# Patient Record
Sex: MALE | Race: WHITE | NOT HISPANIC OR LATINO | Employment: OTHER | URBAN - METROPOLITAN AREA
[De-identification: names, ages, dates, MRNs, and addresses within clinical notes are randomized per-mention and may not be internally consistent; named-entity substitution may affect disease eponyms.]

---

## 2017-07-27 DIAGNOSIS — M54.2 CERVICAL PAIN (NECK): Primary | ICD-10-CM

## 2017-08-14 ENCOUNTER — OFFICE VISIT (OUTPATIENT)
Dept: ORTHOPEDICS | Facility: CLINIC | Age: 67
End: 2017-08-14
Payer: OTHER MISCELLANEOUS

## 2017-08-14 ENCOUNTER — HOSPITAL ENCOUNTER (OUTPATIENT)
Dept: RADIOLOGY | Facility: HOSPITAL | Age: 67
Discharge: HOME | End: 2017-08-14
Attending: ORTHOPAEDIC SURGERY
Payer: OTHER MISCELLANEOUS

## 2017-08-14 VITALS — WEIGHT: 173 LBS | HEIGHT: 70 IN | BODY MASS INDEX: 24.77 KG/M2

## 2017-08-14 DIAGNOSIS — M54.2 CERVICAL PAIN (NECK): Primary | ICD-10-CM

## 2017-08-14 DIAGNOSIS — M54.2 CERVICAL PAIN (NECK): ICD-10-CM

## 2017-08-14 DIAGNOSIS — M43.02 CERVICAL SPONDYLOLYSIS: ICD-10-CM

## 2017-08-14 PROCEDURE — 3008F BODY MASS INDEX DOCD: CPT | Mod: S$GLB,,, | Performed by: ORTHOPAEDIC SURGERY

## 2017-08-14 PROCEDURE — 99999 PR PBB SHADOW E&M-EST. PATIENT-LVL III: CPT | Mod: PBBFAC,,, | Performed by: ORTHOPAEDIC SURGERY

## 2017-08-14 PROCEDURE — 1159F MED LIST DOCD IN RCRD: CPT | Mod: S$GLB,,, | Performed by: ORTHOPAEDIC SURGERY

## 2017-08-14 PROCEDURE — 72040 X-RAY EXAM NECK SPINE 2-3 VW: CPT | Mod: 26,,, | Performed by: RADIOLOGY

## 2017-08-14 PROCEDURE — 1126F AMNT PAIN NOTED NONE PRSNT: CPT | Mod: S$GLB,,, | Performed by: ORTHOPAEDIC SURGERY

## 2017-08-14 PROCEDURE — 99213 OFFICE O/P EST LOW 20 MIN: CPT | Mod: S$GLB,,, | Performed by: ORTHOPAEDIC SURGERY

## 2017-08-14 RX ORDER — DOXYCYCLINE 100 MG/1
100 CAPSULE ORAL EVERY 12 HOURS
Status: ON HOLD | COMMUNITY
End: 2020-08-20

## 2017-08-14 RX ORDER — GABAPENTIN 300 MG/1
300 CAPSULE ORAL 3 TIMES DAILY
COMMUNITY

## 2017-08-14 NOTE — PROGRESS NOTES
Subjective:      Patient ID: Marcos Barfield is a 67 y.o. male.    Chief Complaint: Pain of the Right Shoulder    HPI  Marcos Barfield has right trapezius region pain pain.  The pain is located in the trapezius region.. There is not radiation. He denies any progression of weaknes. There is not instabilty. The pain is described as achy. The pain is aggravated by laying on it and increaesed activity, such as heavy lifting. He has not been shrimping as much as he has in the past. It is alleviated by rest. There is not associated back pain. His history, medications and problem list were reviewed.  Past Medical History:   Diagnosis Date    Anemia     kid      No past surgical history on file.  No family history on file.  Social History     Social History    Marital status:      Spouse name: N/A    Number of children: N/A    Years of education: N/A     Occupational History    Not on file.     Social History Main Topics    Smoking status: Never Smoker    Smokeless tobacco: Never Used    Alcohol use No    Drug use: No    Sexual activity: Not on file     Other Topics Concern    Not on file     Social History Narrative    No narrative on file     Current Outpatient Prescriptions on File Prior to Visit   Medication Sig Dispense Refill    aspirin (ECOTRIN) 81 MG EC tablet Take 81 mg by mouth once daily.        fluticasone (FLONASE) 50 mcg/actuation nasal spray   0    insulin glargine (LANTUS) 100 unit/mL injection Inject 32 Units into the skin every evening.        lisinopril (PRINIVIL,ZESTRIL) 20 MG tablet Take 20 mg by mouth once daily.      metformin (GLUCOPHAGE) 1000 MG tablet Take 1,000 mg by mouth 2 (two) times daily with meals.        meclizine (ANTIVERT) 25 mg tablet   3    simvastatin (ZOCOR) 80 MG tablet Take 80 mg by mouth every evening.         No current facility-administered medications on file prior to visit.      Review of patient's allergies indicates:   Allergen Reactions    Meloxicam  "Other (See Comments)     Drove blood pressure james high per pt       Review of Systems   Constitution: Negative for chills, fever and night sweats.   HENT: Negative for headaches and hearing loss.    Eyes: Negative for blurred vision and double vision.   Cardiovascular: Negative for chest pain, claudication and leg swelling.   Respiratory: Negative for shortness of breath.    Endocrine: Negative for polydipsia, polyphagia and polyuria.   Hematologic/Lymphatic: Negative for adenopathy and bleeding problem. Does not bruise/bleed easily.   Skin: Negative for poor wound healing.   Musculoskeletal: Positive for joint pain.   Gastrointestinal: Negative for diarrhea and heartburn.   Genitourinary: Negative for bladder incontinence.   Neurological: Negative for focal weakness, numbness, paresthesias and sensory change.   Psychiatric/Behavioral: The patient is not nervous/anxious.    Allergic/Immunologic: Negative for persistent infections.         Objective:      Body mass index is 24.82 kg/m².  Vitals:    08/14/17 0940   Weight: 78.5 kg (173 lb)   Height: 5' 10" (1.778 m)         General    Constitutional: He is oriented to person, place, and time. He appears well-developed and well-nourished.   HENT:   Head: Normocephalic and atraumatic.   Eyes: EOM are normal.   Cardiovascular: Normal rate.    Pulmonary/Chest: Effort normal.   Neurological: He is alert and oriented to person, place, and time.   Psychiatric: He has a normal mood and affect. His behavior is normal.             Back (L-Spine & T-Spine) / Neck (C-Spine) Exam      Tenderness   The patient is tender to palpation of the right trapezial (spasm).   The patient is experiencing no tenderness in the right left trapezial.      Neck (C-Spine) Range of Motion   Flexion: Mild and limited  Extension: Mild and limited  Right Lateral Bend: abnormal  Left Lateral Bend: normal  Right Rotation: abnormal  Left Rotation: normal     Other He has no scoliosis .  Spinal Kyphosis: " Absent  Spinal Lordosis: Absent  Head Tilt: Negative     Muscle Strength   Right Upper Extremity    Biceps: 5/5/5    Deltoid: 5/5  Triceps: 5/5  Wrist Extension: 5/5/5    Wrist Flexion: 5/5/5    Finger Flexors: 5/5  Finger Extensors: 5/5  Left Upper Extremity  Biceps: 5/5/5    Deltoid: 5/5  Triceps: 5/5  Wrist Extension: 5/5/5    Wrist Flexion: 5/5/5    Finger Flexors: 5/5  Finger Extensors: 5/5     Reflexes      Left Side  Biceps: 2+  Triceps: 2+  Brachioradialis: 2+  Left Woodard's Sign: Absent     Right Side    Biceps: 2+  Triceps: 2+  Brachioradialis: 2+  Right Woodard's Sign: absent     C-Spine xrays were reviewed. Chronic degenerative changes C5-6, C 6-7.  No significant change               Assessment:       Encounter Diagnoses   Name Primary?    Cervical pain (neck) Yes    Cervical spondylolysis           Plan:       Marcos was seen today for pain.    Diagnoses and all orders for this visit:    Cervical pain (neck)    Cervical spondylolysis      He will continue to take OTC NSAIDS.  He does not want any Meloxicam or muscle relaxer.  F/U in one year with C-Spine Xrays.

## 2017-09-02 ENCOUNTER — OFFICE VISIT (OUTPATIENT)
Dept: URGENT CARE | Facility: CLINIC | Age: 67
End: 2017-09-02
Payer: COMMERCIAL

## 2017-09-02 VITALS
OXYGEN SATURATION: 99 % | HEIGHT: 70 IN | TEMPERATURE: 99 F | HEART RATE: 73 BPM | DIASTOLIC BLOOD PRESSURE: 91 MMHG | BODY MASS INDEX: 24.34 KG/M2 | WEIGHT: 170 LBS | SYSTOLIC BLOOD PRESSURE: 154 MMHG

## 2017-09-02 DIAGNOSIS — M46.1 SACROILIITIS: Primary | ICD-10-CM

## 2017-09-02 PROCEDURE — 1159F MED LIST DOCD IN RCRD: CPT | Mod: S$GLB,,, | Performed by: EMERGENCY MEDICINE

## 2017-09-02 PROCEDURE — 3008F BODY MASS INDEX DOCD: CPT | Mod: S$GLB,,, | Performed by: EMERGENCY MEDICINE

## 2017-09-02 PROCEDURE — 96372 THER/PROPH/DIAG INJ SC/IM: CPT | Mod: S$GLB,,, | Performed by: EMERGENCY MEDICINE

## 2017-09-02 PROCEDURE — 99203 OFFICE O/P NEW LOW 30 MIN: CPT | Mod: 25,S$GLB,, | Performed by: EMERGENCY MEDICINE

## 2017-09-02 RX ORDER — BETAMETHASONE SODIUM PHOSPHATE AND BETAMETHASONE ACETATE 3; 3 MG/ML; MG/ML
9 INJECTION, SUSPENSION INTRA-ARTICULAR; INTRALESIONAL; INTRAMUSCULAR; SOFT TISSUE
Status: COMPLETED | OUTPATIENT
Start: 2017-09-02 | End: 2017-09-02

## 2017-09-02 RX ORDER — KETOROLAC TROMETHAMINE 30 MG/ML
30 INJECTION, SOLUTION INTRAMUSCULAR; INTRAVENOUS
Status: COMPLETED | OUTPATIENT
Start: 2017-09-02 | End: 2017-09-02

## 2017-09-02 RX ADMIN — KETOROLAC TROMETHAMINE 30 MG: 30 INJECTION, SOLUTION INTRAMUSCULAR; INTRAVENOUS at 12:09

## 2017-09-02 RX ADMIN — BETAMETHASONE SODIUM PHOSPHATE AND BETAMETHASONE ACETATE 9 MG: 3; 3 INJECTION, SUSPENSION INTRA-ARTICULAR; INTRALESIONAL; INTRAMUSCULAR; SOFT TISSUE at 12:09
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
## 2018-08-02 DIAGNOSIS — M54.2 NECK PAIN: Primary | ICD-10-CM

## 2018-08-15 ENCOUNTER — HOSPITAL ENCOUNTER (OUTPATIENT)
Dept: RADIOLOGY | Facility: HOSPITAL | Age: 68
Discharge: HOME | End: 2018-08-15
Attending: ORTHOPAEDIC SURGERY
Payer: OTHER MISCELLANEOUS

## 2018-08-15 ENCOUNTER — OFFICE VISIT (OUTPATIENT)
Dept: ORTHOPEDICS | Facility: CLINIC | Age: 68
End: 2018-08-15
Payer: OTHER MISCELLANEOUS

## 2018-08-15 VITALS — HEIGHT: 70 IN | WEIGHT: 170 LBS | BODY MASS INDEX: 24.34 KG/M2

## 2018-08-15 DIAGNOSIS — M54.2 NECK PAIN: ICD-10-CM

## 2018-08-15 DIAGNOSIS — M54.2 CERVICAL PAIN (NECK): Primary | ICD-10-CM

## 2018-08-15 DIAGNOSIS — M43.02 CERVICAL SPONDYLOLYSIS: ICD-10-CM

## 2018-08-15 PROCEDURE — 72050 X-RAY EXAM NECK SPINE 4/5VWS: CPT | Mod: 26,,, | Performed by: RADIOLOGY

## 2018-08-15 PROCEDURE — 72050 X-RAY EXAM NECK SPINE 4/5VWS: CPT | Mod: TC

## 2018-08-15 PROCEDURE — 99213 OFFICE O/P EST LOW 20 MIN: CPT | Mod: S$GLB,,, | Performed by: ORTHOPAEDIC SURGERY

## 2018-08-15 PROCEDURE — 99999 PR PBB SHADOW E&M-EST. PATIENT-LVL III: CPT | Mod: PBBFAC,,, | Performed by: ORTHOPAEDIC SURGERY

## 2018-08-15 RX ORDER — PRAVASTATIN SODIUM 80 MG/1
80 TABLET ORAL NIGHTLY
COMMUNITY

## 2018-08-15 NOTE — PROGRESS NOTES
Patient ID: Marcos Barfield is a 68 y.o. male.     Chief Complaint: Pain of the Right Shoulder     HPI  Marcos Barfield has right trapezius region pain pain.  The pain is located in the trapezius region.. There is not radiation. He denies any progression of weaknes. There is not instabilty. The pain is described as achy. The pain is aggravated by laying on it and increaesed activity, such as heavy lifting. He has not been shrimping as much as he has in the past. It is alleviated by rest. There is not associated back pain. His history, medications and problem list were reviewed.       Past Medical History:   Diagnosis Date    Anemia       kid       No past surgical history on file.  No family history on file.  Social History               Social History    Marital status:        Spouse name: N/A    Number of children: N/A    Years of education: N/A          Occupational History    Not on file.           Social History Main Topics    Smoking status: Never Smoker    Smokeless tobacco: Never Used    Alcohol use No    Drug use: No    Sexual activity: Not on file           Other Topics Concern    Not on file          Social History Narrative    No narrative on file                Current Outpatient Prescriptions on File Prior to Visit   Medication Sig Dispense Refill    aspirin (ECOTRIN) 81 MG EC tablet Take 81 mg by mouth once daily.          fluticasone (FLONASE) 50 mcg/actuation nasal spray     0    insulin glargine (LANTUS) 100 unit/mL injection Inject 32 Units into the skin every evening.          lisinopril (PRINIVIL,ZESTRIL) 20 MG tablet Take 20 mg by mouth once daily.        metformin (GLUCOPHAGE) 1000 MG tablet Take 1,000 mg by mouth 2 (two) times daily with meals.          meclizine (ANTIVERT) 25 mg tablet     3    simvastatin (ZOCOR) 80 MG tablet Take 80 mg by mouth every evening.            No current facility-administered medications on file prior to visit.             Review of patient's  allergies indicates:   Allergen Reactions    Meloxicam Other (See Comments)       Drove blood pressure james high per pt         Review of Systems   Constitution: Negative for chills, fever and night sweats.   HENT: Negative for headaches and hearing loss.    Eyes: Negative for blurred vision and double vision.   Cardiovascular: Negative for chest pain, claudication and leg swelling.   Respiratory: Negative for shortness of breath.    Endocrine: Negative for polydipsia, polyphagia and polyuria.   Hematologic/Lymphatic: Negative for adenopathy and bleeding problem. Does not bruise/bleed easily.   Skin: Negative for poor wound healing.   Musculoskeletal: Positive for joint pain.   Gastrointestinal: Negative for diarrhea and heartburn.   Genitourinary: Negative for bladder incontinence.   Neurological: Negative for focal weakness, numbness, paresthesias and sensory change.   Psychiatric/Behavioral: The patient is not nervous/anxious.    Allergic/Immunologic: Negative for persistent infections.          Objective:   Body mass index is 24.39 kg/m².          General     Constitutional: He is oriented to person, place, and time. He appears well-developed and well-nourished.   HENT:   Head: Normocephalic and atraumatic.   Eyes: EOM are normal.   Cardiovascular: Normal rate.    Pulmonary/Chest: Effort normal.   Neurological: He is alert and oriented to person, place, and time.   Psychiatric: He has a normal mood and affect. His behavior is normal.                  Back (L-Spine & T-Spine) / Neck (C-Spine) Exam      Tenderness   The patient is tender to palpation of the right trapezial (spasm).   The patient is experiencing no tenderness in the left trapezial region.      Neck (C-Spine) Range of Motion   Flexion: Mild and limited  Extension: Mild and limited  Right Lateral Bend: abnormal  Left Lateral Bend: normal  Right Rotation: abnormal  Left Rotation: normal     Other He has no scoliosis .  Spinal Kyphosis: Absent  Spinal  Lordosis: Absent  Head Tilt: Negative     Muscle Strength   Right Upper Extremity    Biceps: 5/5/5    Deltoid: 5/5  Triceps: 5/5  Wrist Extension: 5/5/5    Wrist Flexion: 5/5/5    Finger Flexors: 5/5  Finger Extensors: 5/5  Left Upper Extremity  Biceps: 5/5/5    Deltoid: 5/5  Triceps: 5/5  Wrist Extension: 5/5/5    Wrist Flexion: 5/5/5    Finger Flexors: 5/5  Finger Extensors: 5/5     Reflexes      Left Side  Biceps: 2+  Triceps: 2+  Brachioradialis: 2+  Left Woodard's Sign: Absent     Right Side    Biceps: 2+  Triceps: 2+  Brachioradialis: 2+  Right Woodard's Sign: absent     C-Spine xrays were reviewed. Chronic degenerative changes C5-6, C 6-7.  No significant change since august 2017                  Assessment:            Encounter Diagnoses   Name Primary?    Cervical pain (neck) Yes    Cervical spondylolysis            Plan:       Marcos was seen today for pain.     Diagnoses and all orders for this visit:     Cervical pain (neck)     Cervical spondylolysis        He will continue to take OTC NSAIDS.  He declined Meloxicam.  At this point he will follow up if symptoms worsen.

## 2019-08-16 ENCOUNTER — TELEPHONE (OUTPATIENT)
Dept: ORTHOPEDICS | Facility: CLINIC | Age: 69
End: 2019-08-16

## 2019-08-16 NOTE — TELEPHONE ENCOUNTER
LVM for pt to  Call office back schedudle his appt.        ----- Message from Leela Smith sent at 8/16/2019 10:56 AM CDT -----  Patient calling in requesting a call back in regards to scheduling a workmens comp appt     Please call patient at [phone]    Thank you ,     Melissa - training

## 2019-10-01 DIAGNOSIS — M54.2 NECK PAIN: Primary | ICD-10-CM

## 2019-10-02 ENCOUNTER — HOSPITAL ENCOUNTER (OUTPATIENT)
Dept: RADIOLOGY | Facility: HOSPITAL | Age: 69
Discharge: HOME | End: 2019-10-02
Attending: ORTHOPAEDIC SURGERY
Payer: OTHER MISCELLANEOUS

## 2019-10-02 ENCOUNTER — OFFICE VISIT (OUTPATIENT)
Dept: ORTHOPEDICS | Facility: CLINIC | Age: 69
End: 2019-10-02
Payer: OTHER MISCELLANEOUS

## 2019-10-02 VITALS — BODY MASS INDEX: 24.78 KG/M2 | HEIGHT: 70 IN | WEIGHT: 173.06 LBS

## 2019-10-02 DIAGNOSIS — M54.12 CERVICAL RADICULOPATHY: Primary | ICD-10-CM

## 2019-10-02 DIAGNOSIS — M54.2 NECK PAIN: ICD-10-CM

## 2019-10-02 PROCEDURE — 72050 XR CERVICAL SPINE AP LAT WITH FLEX EXTEN: ICD-10-PCS | Mod: 26,,, | Performed by: RADIOLOGY

## 2019-10-02 PROCEDURE — 99213 PR OFFICE/OUTPT VISIT, EST, LEVL III, 20-29 MIN: ICD-10-PCS | Mod: S$GLB,,, | Performed by: ORTHOPAEDIC SURGERY

## 2019-10-02 PROCEDURE — 99999 PR PBB SHADOW E&M-EST. PATIENT-LVL III: CPT | Mod: PBBFAC,,, | Performed by: ORTHOPAEDIC SURGERY

## 2019-10-02 PROCEDURE — 99213 OFFICE O/P EST LOW 20 MIN: CPT | Mod: S$GLB,,, | Performed by: ORTHOPAEDIC SURGERY

## 2019-10-02 PROCEDURE — 72050 X-RAY EXAM NECK SPINE 4/5VWS: CPT | Mod: TC

## 2019-10-02 PROCEDURE — 99999 PR PBB SHADOW E&M-EST. PATIENT-LVL III: ICD-10-PCS | Mod: PBBFAC,,, | Performed by: ORTHOPAEDIC SURGERY

## 2019-10-02 PROCEDURE — 72050 X-RAY EXAM NECK SPINE 4/5VWS: CPT | Mod: 26,,, | Performed by: RADIOLOGY

## 2019-10-08 ENCOUNTER — HOSPITAL ENCOUNTER (OUTPATIENT)
Dept: RADIOLOGY | Facility: HOSPITAL | Age: 69
Discharge: HOME | End: 2019-10-08
Attending: ORTHOPAEDIC SURGERY
Payer: OTHER MISCELLANEOUS

## 2019-10-08 DIAGNOSIS — M54.12 CERVICAL RADICULOPATHY: ICD-10-CM

## 2019-10-08 PROCEDURE — 72141 MRI NECK SPINE W/O DYE: CPT | Mod: 26,,, | Performed by: RADIOLOGY

## 2019-10-08 PROCEDURE — 72141 MRI CERVICAL SPINE WITHOUT CONTRAST: ICD-10-PCS | Mod: 26,,, | Performed by: RADIOLOGY

## 2019-10-08 PROCEDURE — 72141 MRI NECK SPINE W/O DYE: CPT | Mod: TC

## 2019-10-10 ENCOUNTER — TELEPHONE (OUTPATIENT)
Dept: ORTHOPEDICS | Facility: CLINIC | Age: 69
End: 2019-10-10

## 2019-10-10 NOTE — TELEPHONE ENCOUNTER
Lm for pt to call back regarding his MRI results.    ----- Message from Charles Zamorano MD sent at 10/8/2019 12:16 PM CDT -----  Please call pt.  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  Please schedule with Dr. Saldana.

## 2019-10-11 ENCOUNTER — TELEPHONE (OUTPATIENT)
Dept: ORTHOPEDICS | Facility: CLINIC | Age: 69
End: 2019-10-11

## 2019-10-11 NOTE — TELEPHONE ENCOUNTER
Spoke with pt, notified him of his results and scheduled an appt with back and spine per GC. Pt verbalized understanding and had no further questions.    ----- Message from Cherise Lai sent at 10/11/2019  2:00 PM CDT -----  Contact: patient  [phone]-please call above patient at number in message said,he has called a couple of times need to speak with the nurse,about test results very concerned waiting on a call back thanks.

## 2019-10-11 NOTE — TELEPHONE ENCOUNTER
Attempted to contact pt regarding his MRI results there was no answer and no vm.    ----- Message from Long Fontenot sent at 10/11/2019  9:15 AM CDT -----  Contact: patient   Please call pt at [phone]    Patient is returning staff call from yesterday regarding his MRI scan results    Thank you

## 2019-10-16 ENCOUNTER — OFFICE VISIT (OUTPATIENT)
Dept: ORTHOPEDICS | Facility: CLINIC | Age: 69
End: 2019-10-16
Payer: OTHER MISCELLANEOUS

## 2019-10-16 VITALS — HEIGHT: 70 IN | WEIGHT: 169.75 LBS | BODY MASS INDEX: 24.3 KG/M2

## 2019-10-16 DIAGNOSIS — G89.29 CHRONIC RIGHT SHOULDER PAIN: Primary | ICD-10-CM

## 2019-10-16 DIAGNOSIS — M25.511 CHRONIC RIGHT SHOULDER PAIN: Primary | ICD-10-CM

## 2019-10-16 DIAGNOSIS — M50.30 DDD (DEGENERATIVE DISC DISEASE), CERVICAL: ICD-10-CM

## 2019-10-16 PROCEDURE — 99214 PR OFFICE/OUTPT VISIT, EST, LEVL IV, 30-39 MIN: ICD-10-PCS | Mod: S$GLB,,, | Performed by: PHYSICIAN ASSISTANT

## 2019-10-16 PROCEDURE — 99999 PR PBB SHADOW E&M-EST. PATIENT-LVL III: CPT | Mod: PBBFAC,,, | Performed by: PHYSICIAN ASSISTANT

## 2019-10-16 PROCEDURE — 99999 PR PBB SHADOW E&M-EST. PATIENT-LVL III: ICD-10-PCS | Mod: PBBFAC,,, | Performed by: PHYSICIAN ASSISTANT

## 2019-10-16 PROCEDURE — 99214 OFFICE O/P EST MOD 30 MIN: CPT | Mod: S$GLB,,, | Performed by: PHYSICIAN ASSISTANT

## 2019-10-16 NOTE — LETTER
October 16, 2019      Charles Zamorano MD  [address]  [place] [address]           [facility]  [address]  [place] [address]  Phone: [phone]          Patient: Marcos Barfield   MR Number: [id number]   YOB: 1950   Date of Visit: 10/16/2019       Dear Dr. Charles Zamorano:    Thank you for referring Marcos Barfield to me for evaluation. Attached you will find relevant portions of my assessment and plan of care.    If you have questions, please do not hesitate to call me. I look forward to following Marcos Barfield along with you.    Sincerely,    Ade Berumen PA-C    Enclosure  CC:  No Recipients    If you would like to receive this communication electronically, please contact [email or web address] or [phone] to request more information on [employer] Link access.    For providers and/or their staff who would like to refer a patient to [facility], please contact us through our one-stop-shop provider referral line, [facility], at [phone].    If you feel you have received this communication in error or would no longer like to receive these types of communications, please e-mail [email or web address]

## 2019-10-16 NOTE — PROGRESS NOTES
DATE: 10/16/2019  PATIENT: Marcos Barfield    Supervising Physician: Brandan Saldana M.D.    CHIEF COMPLAINT: right shoulder pain    HISTORY:  Marcos Barfield is a 69 y.o. male previously seen by Dr. Zamorano here for initial evaluation of right shoulder pain (Neck - 4, Shoulder - 4). The pain has been present for many years. The patient describes the pain as aching. The pain is worse with reaching behind and improved by rest. There is associated numbness and tingling related to neuropathy. There is no subjective weakness. Prior treatments have included gabapentin and ESIs in 2006 or 2007, but no surgery.     The patient denies myelopathic symptoms such as handwriting changes or difficulty with buttons/coins/keys. Denies perineal paresthesias, bowel/bladder dysfunction.    PAST MEDICAL/SURGICAL HISTORY:  Past Medical History:   Diagnosis Date    Anemia     kid      History reviewed. No pertinent surgical history.    Medications:  Current Outpatient Medications on File Prior to Visit   Medication Sig Dispense Refill    aspirin (ECOTRIN) 81 MG EC tablet Take 81 mg by mouth once daily.        doxycycline (VIBRAMYCIN) 100 MG Cap Take 100 mg by mouth every 12 (twelve) hours.      fluticasone (FLONASE) 50 mcg/actuation nasal spray   0    gabapentin (NEURONTIN) 300 MG capsule Take 300 mg by mouth 3 (three) times daily.      insulin glargine (LANTUS) 100 unit/mL injection Inject 32 Units into the skin every evening.        lisinopril (PRINIVIL,ZESTRIL) 20 MG tablet Take 20 mg by mouth once daily.      meclizine (ANTIVERT) 25 mg tablet   3    metformin (GLUCOPHAGE) 1000 MG tablet Take 1,000 mg by mouth 2 (two) times daily with meals.        pravastatin (PRAVACHOL) 80 MG tablet Take 80 mg by mouth once daily.      simvastatin (ZOCOR) 80 MG tablet Take 80 mg by mouth every evening.         No current facility-administered medications on file prior to visit.        Social History:   Social History     Socioeconomic  "History    Marital status:      Spouse name: Not on file    Number of children: Not on file    Years of education: Not on file    Highest education level: Not on file   Occupational History    Not on file   Social Needs    Financial resource strain: Not on file    Food insecurity:     Worry: Not on file     Inability: Not on file    Transportation needs:     Medical: Not on file     Non-medical: Not on file   Tobacco Use    Smoking status: Never Smoker    Smokeless tobacco: Never Used   Substance and Sexual Activity    Alcohol use: No    Drug use: No    Sexual activity: Not on file   Lifestyle    Physical activity:     Days per week: Not on file     Minutes per session: Not on file    Stress: Not on file   Relationships    Social connections:     Talks on phone: Not on file     Gets together: Not on file     Attends [employer] service: Not on file     Active member of club or organization: Not on file     Attends meetings of clubs or organizations: Not on file     Relationship status: Not on file   Other Topics Concern    Not on file   Social History Narrative    Not on file       REVIEW OF SYSTEMS:  Constitution: Negative. Negative for chills, fever and night sweats.   Cardiovascular: Negative for chest pain and syncope.   Respiratory: Negative for cough and shortness of breath.   Gastrointestinal: See HPI. Negative for nausea/vomiting. Negative for abdominal pain.  Genitourinary: See HPI. Negative for discoloration or dysuria.  Skin: Negative for dry skin, itching and rash.   Hematologic/Lymphatic: Negative for bleeding problem. Does not bruise/bleed easily.   Musculoskeletal: Negative for falls and muscle weakness.   Neurological: See HPI. No seizures.   Endocrine: Negative for polydipsia, polyphagia and polyuria.   Allergic/Immunologic: Negative for hives and persistent infections.  Psychiatric/Behavioral: Negative for depression and insomnia.         EXAM:  Ht 5' 10" (1.778 m)   Wt 77 " kg (169 lb 12.1 oz)   BMI 24.36 kg/m²     General: The patient is a very pleasant 69 y.o. male in no apparent distress, the patient is oriented to person, place and time.  Psych: Normal mood and affect  HEENT: Vision grossly intact, hearing intact to the spoken word.  Lungs: Respirations unlabored.  Gait: Normal station and gait, no difficulty with toe or heel walk.   Skin: Cervical skin negative for rashes, lesions, hairy patches and surgical scars.  Range of motion: Cervical range of motion is acceptable. There is minimal cervical tenderness to palpation.  There is tenderness to palpation of the right shoulder.  Spinal Balance: Global saggital and coronal spinal balance acceptable, no significant for scoliosis and kyphosis.  Musculoskeletal: there is pain with the range of motion of the right shoulder. Normal bulk and contour of the bilateral hands.  Vascular: Bilateral hands warm and well perfused, radial pulses 2+ bilaterally.  Neurological: Normal strength and tone in all major motor groups in the bilateral upper and lower extremities. Normal sensation to light touch in the C5-T1 and L2-S1 dermatomes bilaterally.  Deep tendon reflexes symmetric 2+ in the bilateral upper and lower extremities.  Negative Inverted Radial Reflex and Woodard's bilaterally. Negative Babinski bilaterally.     IMAGING:   Today I personally reviewed AP, Lat and Flex/Ex  upright C-spine films that demonstrate mild degenerative changes throughout the cervical spine.    MRI cervical spine demonstrates moderate right neural foraminal narrowing at C3/4 and C6/7.      Body mass index is 24.36 kg/m².    No results found for: HGBA1C        ASSESSMENT/PLAN:    Marcos was seen today for neck pain and shoulder pain.    Diagnoses and all orders for this visit:    Chronic right shoulder pain    DDD (degenerative disc disease), cervical        The patient does have spondylosis in the cervical spine, however his symptoms seem to be more related to his  shoulder.  I will refer him to sports to evaluate his shoulder.  We can consider a cervical NICOLLE in the future if they do not think this is related to his shoulder.        Follow up if symptoms worsen or fail to improve.

## 2019-10-17 ENCOUNTER — HOSPITAL ENCOUNTER (OUTPATIENT)
Dept: RADIOLOGY | Facility: HOSPITAL | Age: 69
Discharge: HOME | End: 2019-10-17
Attending: PHYSICIAN ASSISTANT
Payer: OTHER MISCELLANEOUS

## 2019-10-17 ENCOUNTER — OFFICE VISIT (OUTPATIENT)
Dept: SPORTS MEDICINE | Facility: CLINIC | Age: 69
End: 2019-10-17
Payer: OTHER MISCELLANEOUS

## 2019-10-17 VITALS
WEIGHT: 169 LBS | BODY MASS INDEX: 24.2 KG/M2 | SYSTOLIC BLOOD PRESSURE: 144 MMHG | HEIGHT: 70 IN | HEART RATE: 67 BPM | DIASTOLIC BLOOD PRESSURE: 80 MMHG

## 2019-10-17 DIAGNOSIS — M75.101 ROTATOR CUFF SYNDROME, RIGHT: ICD-10-CM

## 2019-10-17 DIAGNOSIS — M25.511 RIGHT SHOULDER PAIN, UNSPECIFIED CHRONICITY: ICD-10-CM

## 2019-10-17 DIAGNOSIS — M25.511 RIGHT SHOULDER PAIN, UNSPECIFIED CHRONICITY: Primary | ICD-10-CM

## 2019-10-17 PROCEDURE — 99999 PR PBB SHADOW E&M-EST. PATIENT-LVL III: ICD-10-PCS | Mod: PBBFAC,,, | Performed by: PHYSICIAN ASSISTANT

## 2019-10-17 PROCEDURE — 73030 X-RAY EXAM OF SHOULDER: CPT | Mod: TC,RT

## 2019-10-17 PROCEDURE — 73030 X-RAY EXAM OF SHOULDER: CPT | Mod: 26,RT,, | Performed by: RADIOLOGY

## 2019-10-17 PROCEDURE — 73030 XR SHOULDER COMPLETE 2 OR MORE VIEWS RIGHT: ICD-10-PCS | Mod: 26,RT,, | Performed by: RADIOLOGY

## 2019-10-17 PROCEDURE — 99203 PR OFFICE/OUTPT VISIT, NEW, LEVL III, 30-44 MIN: ICD-10-PCS | Mod: S$GLB,,, | Performed by: PHYSICIAN ASSISTANT

## 2019-10-17 PROCEDURE — 99999 PR PBB SHADOW E&M-EST. PATIENT-LVL III: CPT | Mod: PBBFAC,,, | Performed by: PHYSICIAN ASSISTANT

## 2019-10-17 PROCEDURE — 99203 OFFICE O/P NEW LOW 30 MIN: CPT | Mod: S$GLB,,, | Performed by: PHYSICIAN ASSISTANT

## 2019-10-17 NOTE — LETTER
October 17, 2019      Ade Berumen PA-C  [address]  [facility] [address]           [facility]  [address]  [facility] [address]  Phone: [phone]          Patient: Marcos Barfield   MR Number: [id number]   YOB: 1950   Date of Visit: 10/17/2019       Dear Ade Berumen:    Thank you for referring Marcos Barfield to me for evaluation. Attached you will find relevant portions of my assessment and plan of care.    If you have questions, please do not hesitate to call me. I look forward to following Marcos Barfield along with you.    Sincerely,    Florina Ocampo PA-C    Enclosure  CC:  No Recipients    If you would like to receive this communication electronically, please contact [email or web address] or [phone] to request more information on [employer] access.    For providers and/or their staff who would like to refer a patient to [facility], please contact us through our one-stop-shop provider referral line, [facility] , at [phone].    If you feel you have received this communication in error or would no longer like to receive these types of communications, please e-mail [email or web address]

## 2019-10-17 NOTE — PROGRESS NOTES
Subjective:          Chief Complaint: Marcos Barfield is a 69 y.o. male who had concerns including Pain of the Right Shoulder.    68 yo M presents to clinic with c/o right shoulder pain since 2005. 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 The saw was stuck and he pulled on it then started to experience right shoulder pain. He was seen by ortho after injury and had MRI of shoulder scheduled. He did not complete MRI due to evacuating for [place]. While staying in [place] after [place], he was seen by a different orthopedic physician who felt that pain was coming from neck and had MRI of neck. He states that he was told that he needed neck surgery. 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 He then followed up with ortho in [place] upon his return and received epidural injections twice with some improvement of sx but still had intermittent shoulder pain throughout.    Shoulder pain worse and more constant over past 2 weeks. Sharp pain starts in shoulder and radiates down arm. Pain is worse with overhead activities and reaching behind his back. Also c/o numbness/tingling down bilateral arms into fingertips. No new injury/trauma.    He saw Dr. Zamorano on 10/2/19 and was diagnosed with cervial pain and underwent MRI which showed spondylosis.  He then saw Ade Berumen PA-C who suggested that pain is coming from shoulder and to return back to see her for possible NICOLLE in future if continued radicular symptoms.     He has not completed any formal PT. He states that he does not take antiinflammatories because meloxicam has caused him to have elevated BP in the past.          Review of Systems   Constitution: Negative for chills, decreased appetite, diaphoresis, fever, malaise/fatigue, night sweats, weight gain and weight loss.   HENT: Negative for congestion, ear  pain and sore throat.    Eyes: Negative for blurred vision, discharge, double vision, pain, photophobia and redness.   Cardiovascular: Negative for chest pain, claudication, cyanosis, dyspnea on exertion, irregular heartbeat and leg swelling.   Respiratory: Negative for cough, hemoptysis and shortness of breath.    Endocrine: Negative for cold intolerance and heat intolerance.   Skin: Negative for color change, dry skin, flushing, itching, nail changes and rash.   Musculoskeletal: Positive for joint pain and neck pain. Negative for arthritis, back pain, falls, gout and joint swelling.   Gastrointestinal: Negative for abdominal pain, anorexia and diarrhea.       Pain Related Questions  Over the past 3 days, what was your average pain during activity? (I.e. running, jogging, walking, climbing stairs, getting dressed, ect.): 8  Over the past 3 days, what was your highest pain level?: 8  Over the past 3 days, what was your lowest pain level? : 4    Other  How many nights a week are you awakened by your affected body part?: 7  Was the patient's HEIGHT measured or patient reported?: Patient Reported  Was the patient's WEIGHT measured or patient reported?: Measured      Objective:        General: Marcos is well-developed, well-nourished, appears stated age, in no acute distress, alert and oriented to time, place and person.     General    Vitals reviewed.  Constitutional: He is oriented to person, place, and time. He appears well-developed and well-nourished. No distress.   Neck: Normal range of motion.   Cardiovascular: Normal rate and regular rhythm.    Pulmonary/Chest: Effort normal. No respiratory distress.   Neurological: He is alert and oriented to person, place, and time.   Psychiatric: He has a normal mood and affect. His behavior is normal. Thought content normal.         Back (L-Spine & T-Spine) / Neck (C-Spine) Exam     Tenderness   The patient is tender to palpation of the right trapezial.   Right Shoulder Exam      Inspection/Observation   Swelling: absent  Bruising: absent  Deformity: absent    Tenderness   The patient is tender to palpation of the biceps tendon, acromioclavicular joint and supraspinatus (subscapularis).    Range of Motion   Active abduction:  120 abnormal   Passive abduction:  120 abnormal   Extension: 30   Forward Flexion:  120 (+ pain) abnormal Adduction: 40   External Rotation 0 degrees: 40   Internal rotation 0 degrees:  Sacrum abnormal     Tests & Signs   Apprehension: negative  Cross arm: positive  Drop arm: negative  Painter test: positive  Impingement: positive  Anterosuperior Escape: negative  Lift Off Sign: positive  Belly Press: positive  Active Compression Test ([place]'s Sign): negative  Yergason's Test: negative  Speed's Test: positive    Other   Sensation: normal    Left Shoulder Exam     Inspection/Observation   Swelling: absent  Bruising: absent  Scars: absent  Deformity: absent  Scapular Winging: absent  Scapular Dyskinesia: negative  Atrophy: absent    Tenderness   The patient is experiencing no tenderness.     Range of Motion   Active abduction: 140   Passive abduction: 140   Extension: 30   Forward Flexion: 140 Adduction: 40   External Rotation 0 degrees: 40   Internal rotation 0 degrees: T6     Tests & Signs   Apprehension: negative  Cross arm: negative  Drop arm: negative  Painter test: negative  Impingement: negative  Anterosuperior Escape: negative  Lift Off Sign: negative  Belly Press: negative  Active Compression test ([place]'s Sign): negative  Yergasons's Test: negative  Speed's Test: negative    Other   Sensation: normal       Muscle Strength   Right Upper Extremity   Shoulder Abduction: 4/5   Shoulder Internal Rotation: 4/5   Shoulder External Rotation: 4/5   Supraspinatus: 4/5 (+ pain)/5   Subscapularis: 4/5/5   Biceps: 4/5/5   Left Upper Extremity  Shoulder Abduction: 5/5   Shoulder Internal Rotation: 5/5   Shoulder External Rotation: 5/5   Supraspinatus: 5/5/5    Subscapularis: 5/5/5   Biceps: 4/5/5       RADIOGRAPHS:    Xray right shoulder (10/17/19):  Visualized osseous structures appear unremarkable, with no evidence of recent or healing fracture, lytic destructive process, or appreciable glenohumeral arthritic change noted.  No glenohumeral dislocation.  No abnormal soft tissue calcifications.    MRI cervical spine (10/8/19):  Cervical spondylosis, resulting in mild/ moderate neural foraminal narrowing from C2-C3 through C6-C7, as above.  No spinal canal stenosis.        Assessment:       Encounter Diagnoses   Name Primary?    Right shoulder pain, unspecified chronicity Yes    Rotator cuff syndrome, right           Plan:         1. MRI right shoulder to assess for rotator cuff pathology  2. Physical therapy referral to [facility] for RC and periscapular strengthening  3. Follow up with Florina in 1 week for MRI results  4. I made the decision to obtain old records of the patient including previous notes and imaging. New imaging was ordered today of the extremity or extremities evaluated. I independently reviewed and interpreted the radiographs today as well as prior imaging. Reviewed radiographs with patient in detail.  5. Tylenol prn pain

## 2019-10-23 ENCOUNTER — HOSPITAL ENCOUNTER (OUTPATIENT)
Dept: RADIOLOGY | Facility: HOSPITAL | Age: 69
Discharge: HOME | End: 2019-10-23
Attending: PHYSICIAN ASSISTANT
Payer: OTHER MISCELLANEOUS

## 2019-10-23 DIAGNOSIS — M75.101 ROTATOR CUFF SYNDROME, RIGHT: ICD-10-CM

## 2019-10-23 DIAGNOSIS — M25.511 RIGHT SHOULDER PAIN, UNSPECIFIED CHRONICITY: ICD-10-CM

## 2019-10-23 PROCEDURE — 73221 MRI JOINT UPR EXTREM W/O DYE: CPT | Mod: TC,RT

## 2019-10-23 PROCEDURE — 73221 MRI JOINT UPR EXTREM W/O DYE: CPT | Mod: 26,RT,, | Performed by: RADIOLOGY

## 2019-10-23 PROCEDURE — 73221 MRI SHOULDER WITHOUT CONTRAST RIGHT: ICD-10-PCS | Mod: 26,RT,, | Performed by: RADIOLOGY

## 2019-10-25 ENCOUNTER — OFFICE VISIT (OUTPATIENT)
Dept: SPORTS MEDICINE | Facility: CLINIC | Age: 69
End: 2019-10-25
Payer: OTHER MISCELLANEOUS

## 2019-10-25 VITALS
BODY MASS INDEX: 24.2 KG/M2 | WEIGHT: 169 LBS | DIASTOLIC BLOOD PRESSURE: 78 MMHG | HEART RATE: 71 BPM | HEIGHT: 70 IN | SYSTOLIC BLOOD PRESSURE: 137 MMHG

## 2019-10-25 DIAGNOSIS — M19.011 DJD OF RIGHT AC (ACROMIOCLAVICULAR) JOINT: ICD-10-CM

## 2019-10-25 DIAGNOSIS — M75.21 BICEPS TENDONITIS, RIGHT: ICD-10-CM

## 2019-10-25 DIAGNOSIS — S46.011D TRAUMATIC INCOMPLETE TEAR OF RIGHT ROTATOR CUFF, SUBSEQUENT ENCOUNTER: Primary | ICD-10-CM

## 2019-10-25 PROCEDURE — 20610 DRAIN/INJ JOINT/BURSA W/O US: CPT | Mod: RT,S$GLB,, | Performed by: PHYSICIAN ASSISTANT

## 2019-10-25 PROCEDURE — 20610 PR DRAIN/INJECT LARGE JOINT/BURSA: ICD-10-PCS | Mod: RT,S$GLB,, | Performed by: PHYSICIAN ASSISTANT

## 2019-10-25 PROCEDURE — 99999 PR PBB SHADOW E&M-EST. PATIENT-LVL III: ICD-10-PCS | Mod: PBBFAC,,, | Performed by: PHYSICIAN ASSISTANT

## 2019-10-25 PROCEDURE — 99999 PR PBB SHADOW E&M-EST. PATIENT-LVL III: CPT | Mod: PBBFAC,,, | Performed by: PHYSICIAN ASSISTANT

## 2019-10-25 PROCEDURE — 99214 PR OFFICE/OUTPT VISIT, EST, LEVL IV, 30-39 MIN: ICD-10-PCS | Mod: 25,S$GLB,, | Performed by: PHYSICIAN ASSISTANT

## 2019-10-25 PROCEDURE — 99214 OFFICE O/P EST MOD 30 MIN: CPT | Mod: 25,S$GLB,, | Performed by: PHYSICIAN ASSISTANT

## 2019-10-25 RX ORDER — TRIAMCINOLONE ACETONIDE 40 MG/ML
40 INJECTION, SUSPENSION INTRA-ARTICULAR; INTRAMUSCULAR
Status: COMPLETED | OUTPATIENT
Start: 2019-10-25 | End: 2019-10-25

## 2019-10-25 RX ORDER — BUPIVACAINE HYDROCHLORIDE 2.5 MG/ML
3 INJECTION, SOLUTION INFILTRATION; PERINEURAL
Status: COMPLETED | OUTPATIENT
Start: 2019-10-25 | End: 2019-10-25

## 2019-10-25 RX ADMIN — BUPIVACAINE HYDROCHLORIDE 7.5 MG: 2.5 INJECTION, SOLUTION INFILTRATION; PERINEURAL at 03:10

## 2019-10-25 RX ADMIN — TRIAMCINOLONE ACETONIDE 40 MG: 40 INJECTION, SUSPENSION INTRA-ARTICULAR; INTRAMUSCULAR at 03:10

## 2019-10-29 PROBLEM — M25.511 RIGHT SHOULDER PAIN: Status: ACTIVE | Noted: 2019-10-29

## 2019-10-29 PROBLEM — M75.101 ROTATOR CUFF SYNDROME OF RIGHT SHOULDER: Status: ACTIVE | Noted: 2019-10-29

## 2019-12-12 ENCOUNTER — OFFICE VISIT (OUTPATIENT)
Dept: SPORTS MEDICINE | Facility: CLINIC | Age: 69
End: 2019-12-12
Payer: OTHER MISCELLANEOUS

## 2019-12-12 VITALS
DIASTOLIC BLOOD PRESSURE: 80 MMHG | BODY MASS INDEX: 24.2 KG/M2 | HEART RATE: 64 BPM | HEIGHT: 70 IN | SYSTOLIC BLOOD PRESSURE: 153 MMHG | WEIGHT: 169 LBS

## 2019-12-12 DIAGNOSIS — M25.511 CHRONIC RIGHT SHOULDER PAIN: ICD-10-CM

## 2019-12-12 DIAGNOSIS — M75.101 ROTATOR CUFF SYNDROME, RIGHT: Primary | ICD-10-CM

## 2019-12-12 DIAGNOSIS — G89.29 CHRONIC RIGHT SHOULDER PAIN: ICD-10-CM

## 2019-12-12 PROCEDURE — 99214 OFFICE O/P EST MOD 30 MIN: CPT | Mod: S$GLB,,, | Performed by: PHYSICIAN ASSISTANT

## 2019-12-12 PROCEDURE — 99999 PR PBB SHADOW E&M-EST. PATIENT-LVL III: ICD-10-PCS | Mod: PBBFAC,,, | Performed by: PHYSICIAN ASSISTANT

## 2019-12-12 PROCEDURE — 99999 PR PBB SHADOW E&M-EST. PATIENT-LVL III: CPT | Mod: PBBFAC,,, | Performed by: PHYSICIAN ASSISTANT

## 2019-12-12 PROCEDURE — 1125F AMNT PAIN NOTED PAIN PRSNT: CPT | Mod: S$GLB,,, | Performed by: PHYSICIAN ASSISTANT

## 2019-12-12 PROCEDURE — 99214 PR OFFICE/OUTPT VISIT, EST, LEVL IV, 30-39 MIN: ICD-10-PCS | Mod: S$GLB,,, | Performed by: PHYSICIAN ASSISTANT

## 2019-12-12 PROCEDURE — 1159F MED LIST DOCD IN RCRD: CPT | Mod: S$GLB,,, | Performed by: PHYSICIAN ASSISTANT

## 2019-12-12 PROCEDURE — 1159F PR MEDICATION LIST DOCUMENTED IN MEDICAL RECORD: ICD-10-PCS | Mod: S$GLB,,, | Performed by: PHYSICIAN ASSISTANT

## 2019-12-12 PROCEDURE — 1125F PR PAIN SEVERITY QUANTIFIED, PAIN PRESENT: ICD-10-PCS | Mod: S$GLB,,, | Performed by: PHYSICIAN ASSISTANT

## 2019-12-12 NOTE — PROGRESS NOTES
Subjective:          Chief Complaint: Marcos Barfield is a 69 y.o. male who had concerns including Pain of the Right Shoulder.    68 yo male with PMHx of DM presents to clinic with c/o right shoulder pain since 2005. He is here today for follow up right shoulder. This is a worker's comp injury. 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 The saw was stuck and he pulled on it then started to experience right shoulder pain. He was seen by ortho after injury and had MRI of shoulder scheduled. He did not complete MRI due to evacuating for [place]. While staying in [place] after [place], he was seen by a different orthopedic physician who felt that pain was coming from neck and had MRI of neck. He states that he was told that he needed neck surgery. 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 He then followed up with ortho in [place] upon his return and received epidural injections twice with some improvement of sx but still had intermittent shoulder pain throughout.    He saw Dr. Zamorano on 10/2/19 and was diagnosed with cervial pain and underwent MRI which showed spondylosis.  He then saw Ade Berumen PA-C who suggested that pain is coming from shoulder and to return back to see her for possible NICOLLE in future if continued radicular symptoms.     He has completed formal PT for 6 weeks for his shoulder with an increase in his ROM and strength and a decrease in his pain. He received a steroid injection in his right shoulder on 10/25/19 with no benefits. He states that he does not take antiinflammatories because meloxicam has caused him to have elevated BP in the past. Pain is worse with lying on right shoulder and reaching behind him. He is not interested in surgery at this time and would like to continue formal PT. He reports pain not being constant anymore.  Pain at its worst is 3/10.      Pain   Associated symptoms include neck pain. Pertinent negatives include no abdominal pain, anorexia, chest pain, chills, congestion, coughing, diaphoresis, fever, joint swelling, rash or sore throat.       Review of Systems   Constitution: Negative for chills, decreased appetite, diaphoresis, fever, malaise/fatigue, night sweats, weight gain and weight loss.   HENT: Negative for congestion, ear pain and sore throat.    Eyes: Negative for blurred vision, discharge, double vision, pain, photophobia and redness.   Cardiovascular: Negative for chest pain, claudication, cyanosis, dyspnea on exertion, irregular heartbeat and leg swelling.   Respiratory: Negative for cough, hemoptysis and shortness of breath.    Endocrine: Negative for cold intolerance and heat intolerance.   Skin: Negative for color change, dry skin, flushing, itching, nail changes and rash.   Musculoskeletal: Positive for joint pain and neck pain. Negative for arthritis, back pain, falls, gout and joint swelling.   Gastrointestinal: Negative for abdominal pain, anorexia and diarrhea.       Pain Related Questions  Over the past 3 days, what was your average pain during activity? (I.e. running, jogging, walking, climbing stairs, getting dressed, ect.): 7  Over the past 3 days, what was your highest pain level?: 7  Over the past 3 days, what was your lowest pain level? : 4    Other  Was the patient's HEIGHT measured or patient reported?: Patient Reported  Was the patient's WEIGHT measured or patient reported?: Measured      Objective:        General: Marcos is well-developed, well-nourished, appears stated age, in no acute distress, alert and oriented to time, place and person.     General    Vitals reviewed.  Constitutional: He is oriented to person, place, and time. He appears well-developed and well-nourished. No distress.   Neck: Normal range of motion.   Cardiovascular: Normal rate and regular rhythm.     Pulmonary/Chest: Effort normal. No respiratory distress.   Neurological: He is alert and oriented to person, place, and time.   Psychiatric: He has a normal mood and affect. His behavior is normal. Thought content normal.         Back (L-Spine & T-Spine) / Neck (C-Spine) Exam     Tenderness   The patient is tender to palpation of the right trapezial.   Right Shoulder Exam     Inspection/Observation   Swelling: absent  Bruising: absent  Scars: absent  Deformity: absent  Scapular Winging: absent  Scapular Dyskinesia: negative  Atrophy: absent    Tenderness   The patient is tender to palpation of the biceps tendon.    Range of Motion   Active abduction:  110 abnormal   Passive abduction:  110 abnormal   Extension:  40 normal   Forward Flexion:  130 (+ pain) abnormal   Adduction: 50 normal  External Rotation 0 degrees:  40 normal   Internal rotation 0 degrees:  L5 (pain) abnormal     Tests & Signs   Apprehension: negative  Cross arm: positive  Drop arm: negative  Painter test: positive  Impingement: positive  Anterosuperior Escape: negative  Lift Off Sign: positive  Belly Press: negative  Active Compression Test ([place]'s Sign): negative  Yergason's Test: negative  Speed's Test: positive    Other   Sensation: normal    Left Shoulder Exam     Inspection/Observation   Swelling: absent  Bruising: absent  Scars: absent  Deformity: absent  Scapular Winging: absent  Scapular Dyskinesia: negative  Atrophy: absent    Tenderness   The patient is experiencing no tenderness.     Range of Motion   Active abduction:  120 normal   Passive abduction:  120 normal   Extension:  40 normal   Forward Flexion: 140   Adduction: 50 normal  External Rotation 0 degrees:  40 normal   Internal rotation 0 degrees:  T8 normal     Tests & Signs   Apprehension: negative  Cross arm: negative  Drop arm: negative  Painter test: negative  Impingement: negative  Anterosuperior Escape: negative  Lift Off Sign: negative  Belly Press: negative  Active  Compression test ([place]'s Sign): negative  Yergasons's Test: negative  Speed's Test: negative    Other   Sensation: normal       Muscle Strength   Right Upper Extremity   Shoulder Abduction: 4/5   Shoulder Internal Rotation: 5/5   Shoulder External Rotation: 5/5   Supraspinatus: 4/5 (+ pain)/5   Subscapularis: 4/5/5   Biceps: 4/5/5   Left Upper Extremity  Shoulder Abduction: 5/5   Shoulder Internal Rotation: 5/5   Shoulder External Rotation: 5/5   Supraspinatus: 5/5/5   Subscapularis: 5/5/5   Biceps: 5/5/5       RADIOGRAPHS:    Xray right shoulder (10/17/19):  Visualized osseous structures appear unremarkable, with no evidence of recent or healing fracture, lytic destructive process, or appreciable glenohumeral arthritic change noted.  No glenohumeral dislocation.  No abnormal soft tissue calcifications.    MRI cervical spine (10/8/19):  Cervical spondylosis, resulting in mild/ moderate neural foraminal narrowing from C2-C3 through C6-C7, as above.  No spinal canal stenosis.    MRI right shoulder: (10/23/19)  FINDINGS:  Rotator cuff: There is a high-grade partial thickness undersurface tear of the supraspinatus at the footprint, measuring 2.0 cm AP x 0.7 cm TV.  Infraspinatus, subscapularis, and teres minor tendons are intact.  Muscle bulk is maintained.    Labrum: Glenoid labrum is intact.    Biceps: Long head biceps tendon is intact.    Bone: There is no fracture.  Bone marrow signal is unremarkable.    Acromioclavicular joint: Moderate arthrosis with subacromial spur.    Cartilage: Articular cartilage of the glenohumeral joint is preserved.    Miscellaneous: Thickening of subacromial subdeltoid bursa.  Nonspecific thickening of inferior glenohumeral ligament.              Assessment:       Encounter Diagnoses   Name Primary?    Rotator cuff syndrome, right Yes    Chronic right shoulder pain           Plan:       1. Continue Physical therapy at [facility] for RC and periscapular strengthening- 12  additional visits approved by worker's comp (through January 24)    2. I made the decision to obtain old records of the patient including previous notes and imaging. I independently reviewed and interpreted the radiographs today as well as prior imaging. Reviewed radiographs and MRI with patient in detail. Patient would like to continue with conservative treatment since he has experienced benefits. He is not interested in speaking with a surgeon at this time. Failed steroid injection given on 10/25/19.    3. Tylenol prn pain    4.RTC in 6 weeks with Florina Ocampo PA-C for follow up. If continued pain, will refer to surgeon.

## 2020-01-27 ENCOUNTER — OFFICE VISIT (OUTPATIENT)
Dept: SPORTS MEDICINE | Facility: CLINIC | Age: 70
End: 2020-01-27
Payer: OTHER MISCELLANEOUS

## 2020-01-27 VITALS
WEIGHT: 169 LBS | HEIGHT: 70 IN | SYSTOLIC BLOOD PRESSURE: 142 MMHG | HEART RATE: 74 BPM | DIASTOLIC BLOOD PRESSURE: 74 MMHG | BODY MASS INDEX: 24.2 KG/M2

## 2020-01-27 DIAGNOSIS — M75.101 ROTATOR CUFF SYNDROME, RIGHT: Primary | ICD-10-CM

## 2020-01-27 DIAGNOSIS — G89.29 CHRONIC RIGHT SHOULDER PAIN: ICD-10-CM

## 2020-01-27 DIAGNOSIS — M25.511 CHRONIC RIGHT SHOULDER PAIN: ICD-10-CM

## 2020-01-27 PROCEDURE — 99999 PR PBB SHADOW E&M-EST. PATIENT-LVL III: CPT | Mod: PBBFAC,,, | Performed by: PHYSICIAN ASSISTANT

## 2020-01-27 PROCEDURE — 1125F PR PAIN SEVERITY QUANTIFIED, PAIN PRESENT: ICD-10-PCS | Mod: S$GLB,,, | Performed by: PHYSICIAN ASSISTANT

## 2020-01-27 PROCEDURE — 99214 OFFICE O/P EST MOD 30 MIN: CPT | Mod: S$GLB,,, | Performed by: PHYSICIAN ASSISTANT

## 2020-01-27 PROCEDURE — 99999 PR PBB SHADOW E&M-EST. PATIENT-LVL III: ICD-10-PCS | Mod: PBBFAC,,, | Performed by: PHYSICIAN ASSISTANT

## 2020-01-27 PROCEDURE — 1159F PR MEDICATION LIST DOCUMENTED IN MEDICAL RECORD: ICD-10-PCS | Mod: S$GLB,,, | Performed by: PHYSICIAN ASSISTANT

## 2020-01-27 PROCEDURE — 1125F AMNT PAIN NOTED PAIN PRSNT: CPT | Mod: S$GLB,,, | Performed by: PHYSICIAN ASSISTANT

## 2020-01-27 PROCEDURE — 99214 PR OFFICE/OUTPT VISIT, EST, LEVL IV, 30-39 MIN: ICD-10-PCS | Mod: S$GLB,,, | Performed by: PHYSICIAN ASSISTANT

## 2020-01-27 PROCEDURE — 1159F MED LIST DOCD IN RCRD: CPT | Mod: S$GLB,,, | Performed by: PHYSICIAN ASSISTANT

## 2020-01-27 NOTE — PROGRESS NOTES
Subjective:          Chief Complaint: Marcos Barfield is a 69 y.o. male who had concerns including Pain of the Right Shoulder.    68 yo male with PMHx of DM presents to clinic with c/o right shoulder pain since 2005. He is here today for follow up right shoulder. This is a worker's comp injury. 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 The saw was stuck and he pulled on it then started to experience right shoulder pain. He was seen by ortho after injury and had MRI of shoulder scheduled. He did not complete MRI due to evacuating for [place]. While staying in [place] after [place], he was seen by a different orthopedic physician who felt that pain was coming from neck and had MRI of neck. He states that he was told that he needed neck surgery. 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 He then followed up with ortho in [place] upon his return and received epidural injections twice with some improvement of sx but still had intermittent shoulder pain throughout.    He saw Dr. Zamorano on 10/2/19 and was diagnosed with cervial pain and underwent MRI which showed spondylosis.  He then saw Ade Berumen PA-C who suggested that pain is coming from shoulder and to return back to see her for possible NICOLLE in future if continued radicular symptoms.     He has completed formal PT for 12 weeks for his shoulder with an increase in his ROM and strength and a decrease in his pain. He would like to continue formal PT.  He received a steroid injection in his right shoulder on 10/25/19 with no benefits. He states that he does not take antiinflammatories because meloxicam has caused him to have elevated BP in the past. Pain is worse with lying on right shoulder and reaching behind him. He is not interested in surgery at this time and would like to continue formal PT. He  reports pain not being constant anymore. Pain at its worst is 3/10.      Pain   Associated symptoms include neck pain. Pertinent negatives include no abdominal pain, anorexia, chest pain, chills, congestion, coughing, diaphoresis, fever, joint swelling, rash or sore throat.       Review of Systems   Constitution: Negative for chills, decreased appetite, diaphoresis, fever, malaise/fatigue, night sweats, weight gain and weight loss.   HENT: Negative for congestion, ear pain and sore throat.    Eyes: Negative for blurred vision, discharge, double vision, pain, photophobia and redness.   Cardiovascular: Negative for chest pain, claudication, cyanosis, dyspnea on exertion, irregular heartbeat and leg swelling.   Respiratory: Negative for cough, hemoptysis and shortness of breath.    Endocrine: Negative for cold intolerance and heat intolerance.   Skin: Negative for color change, dry skin, flushing, itching, nail changes and rash.   Musculoskeletal: Positive for joint pain and neck pain. Negative for arthritis, back pain, falls, gout and joint swelling.   Gastrointestinal: Negative for abdominal pain, anorexia and diarrhea.       Pain Related Questions  Over the past 3 days, what was your average pain during activity? (I.e. running, jogging, walking, climbing stairs, getting dressed, ect.): 3  Over the past 3 days, what was your highest pain level?: 5  Over the past 3 days, what was your lowest pain level? : 2    Other  How many nights a week are you awakened by your affected body part?: 7  Was the patient's HEIGHT measured or patient reported?: Patient Reported  Was the patient's WEIGHT measured or patient reported?: Measured      Objective:        General: Marcos is well-developed, well-nourished, appears stated age, in no acute distress, alert and oriented to time, place and person.     General    Vitals reviewed.  Constitutional: He is oriented to person, place, and time. He appears well-developed and well-nourished.  No distress.   Neck: Normal range of motion.   Cardiovascular: Normal rate and regular rhythm.    Pulmonary/Chest: Effort normal. No respiratory distress.   Neurological: He is alert and oriented to person, place, and time.   Psychiatric: He has a normal mood and affect. His behavior is normal. Thought content normal.         Back (L-Spine & T-Spine) / Neck (C-Spine) Exam     Tenderness   The patient is tender to palpation of the right trapezial.   Right Shoulder Exam     Inspection/Observation   Swelling: absent  Bruising: absent  Scars: absent  Deformity: absent  Scapular Winging: absent  Scapular Dyskinesia: negative  Atrophy: absent    Tenderness   The patient is tender to palpation of the biceps tendon.    Range of Motion   Active abduction:  110 abnormal   Passive abduction:  120 abnormal   Extension:  40 normal   Forward Flexion:  130 abnormal   Adduction: 50 normal  External Rotation 0 degrees:  40 normal   Internal rotation 0 degrees:  L2 (pain) abnormal     Tests & Signs   Apprehension: negative  Cross arm: negative  Drop arm: negative  Painter test: positive  Impingement: positive  Rotator Cuff Painful Arc/Range: mild  Anterosuperior Escape: negative  Lift Off Sign: positive  Belly Press: negative  Active Compression Test ([place]'s Sign): negative  Yergason's Test: negative  Speed's Test: negative  Anterior Drawer Test: 1+   Posterior Drawer Test: 1+    Other   Sensation: normal    Left Shoulder Exam     Inspection/Observation   Swelling: absent  Bruising: absent  Scars: absent  Deformity: absent  Scapular Winging: absent  Scapular Dyskinesia: negative  Atrophy: absent    Tenderness   The patient is experiencing no tenderness.     Range of Motion   Active abduction:  120 normal   Passive abduction:  120 normal   Extension:  40 normal   Forward Flexion: 140   Adduction: 50 normal  External Rotation 0 degrees:  40 normal   Internal rotation 0 degrees:  T8 normal     Tests & Signs   Apprehension:  negative  Cross arm: negative  Drop arm: negative  Painter test: negative  Impingement: negative  Anterosuperior Escape: negative  Lift Off Sign: negative  Belly Press: negative  Active Compression test ([place]'s Sign): negative  Yergasons's Test: negative  Speed's Test: negative  Anterior Drawer Test: 1+  Posterior Drawer Test: 1+    Other   Sensation: normal       Muscle Strength   Right Upper Extremity   Shoulder Abduction: 4/5 (4+)   Shoulder Internal Rotation: 5/5   Shoulder External Rotation: 5/5   Supraspinatus: 4/5 (4+)/5   Subscapularis: 5/5/5   Biceps: 5/5/5   Left Upper Extremity  Shoulder Abduction: 5/5   Shoulder Internal Rotation: 5/5   Shoulder External Rotation: 5/5   Supraspinatus: 5/5/5   Subscapularis: 5/5/5   Biceps: 5/5/5       RADIOGRAPHS:    Xray right shoulder (10/17/19):  Visualized osseous structures appear unremarkable, with no evidence of recent or healing fracture, lytic destructive process, or appreciable glenohumeral arthritic change noted.  No glenohumeral dislocation.  No abnormal soft tissue calcifications.    MRI cervical spine (10/8/19):  Cervical spondylosis, resulting in mild/ moderate neural foraminal narrowing from C2-C3 through C6-C7, as above.  No spinal canal stenosis.    MRI right shoulder: (10/23/19)  FINDINGS:  Rotator cuff: There is a high-grade partial thickness undersurface tear of the supraspinatus at the footprint, measuring 2.0 cm AP x 0.7 cm TV.  Infraspinatus, subscapularis, and teres minor tendons are intact.  Muscle bulk is maintained.    Labrum: Glenoid labrum is intact.    Biceps: Long head biceps tendon is intact.    Bone: There is no fracture.  Bone marrow signal is unremarkable.    Acromioclavicular joint: Moderate arthrosis with subacromial spur.    Cartilage: Articular cartilage of the glenohumeral joint is preserved.    Miscellaneous: Thickening of subacromial subdeltoid bursa.  Nonspecific thickening of inferior glenohumeral ligament.               Assessment:       Encounter Diagnoses   Name Primary?    Rotator cuff syndrome, right Yes    Chronic right shoulder pain           Plan:       1. Continue Physical therapy at [facility] for RC and periscapular strengthening-new referral placed.    2. I made the decision to obtain old records of the patient including previous notes and imaging. I independently reviewed and interpreted the radiographs today as well as prior imaging. Patient would like to continue with conservative treatment since he has experienced benefits. He is not interested in speaking with a surgeon at this time. Failed steroid injection given on 10/25/19.    3. Tylenol prn pain    4.RTC in 8 weeks with mid level provider for follow up. If continued pain, will refer to surgeon.

## 2020-03-20 ENCOUNTER — TELEPHONE (OUTPATIENT)
Dept: SPORTS MEDICINE | Facility: CLINIC | Age: 70
End: 2020-03-20

## 2020-03-20 NOTE — TELEPHONE ENCOUNTER
Spoke to pt to see how his shoulder was doing and to see if he needed to be seen on Monday for his f/u appointment with Jaxson Pan PA-C. Pt stated his shoulder was doing okay and that he would prefer to postpone his appointment. Rescheduled him for 4/27 with Jaxson.

## 2020-04-22 ENCOUNTER — TELEPHONE (OUTPATIENT)
Dept: SPORTS MEDICINE | Facility: CLINIC | Age: 70
End: 2020-04-22

## 2020-06-24 ENCOUNTER — OFFICE VISIT (OUTPATIENT)
Dept: SPORTS MEDICINE | Facility: CLINIC | Age: 70
End: 2020-06-24
Payer: OTHER MISCELLANEOUS

## 2020-06-24 ENCOUNTER — TELEPHONE (OUTPATIENT)
Dept: SPORTS MEDICINE | Facility: CLINIC | Age: 70
End: 2020-06-24

## 2020-06-24 VITALS
HEART RATE: 69 BPM | HEIGHT: 70 IN | DIASTOLIC BLOOD PRESSURE: 83 MMHG | BODY MASS INDEX: 24.2 KG/M2 | SYSTOLIC BLOOD PRESSURE: 163 MMHG | WEIGHT: 169 LBS

## 2020-06-24 DIAGNOSIS — Z01.818 PREOP TESTING: Primary | ICD-10-CM

## 2020-06-24 DIAGNOSIS — M75.21 BICEPS TENDONITIS, RIGHT: ICD-10-CM

## 2020-06-24 DIAGNOSIS — M19.011 DJD OF RIGHT AC (ACROMIOCLAVICULAR) JOINT: ICD-10-CM

## 2020-06-24 DIAGNOSIS — S46.011D TRAUMATIC INCOMPLETE TEAR OF RIGHT ROTATOR CUFF, SUBSEQUENT ENCOUNTER: Primary | ICD-10-CM

## 2020-06-24 PROCEDURE — 99213 OFFICE O/P EST LOW 20 MIN: CPT | Mod: S$GLB,,, | Performed by: PHYSICIAN ASSISTANT

## 2020-06-24 PROCEDURE — 99213 PR OFFICE/OUTPT VISIT, EST, LEVL III, 20-29 MIN: ICD-10-PCS | Mod: S$GLB,,, | Performed by: PHYSICIAN ASSISTANT

## 2020-06-24 PROCEDURE — 99999 PR PBB SHADOW E&M-EST. PATIENT-LVL III: ICD-10-PCS | Mod: PBBFAC,,, | Performed by: PHYSICIAN ASSISTANT

## 2020-06-24 PROCEDURE — 99999 PR PBB SHADOW E&M-EST. PATIENT-LVL III: CPT | Mod: PBBFAC,,, | Performed by: PHYSICIAN ASSISTANT

## 2020-06-24 NOTE — PROGRESS NOTES
Subjective:          Chief Complaint: Marcos Barfield is a 70 y.o. male who had concerns including Pain of the Right Shoulder.    68 yo male with PMHx of DM presents to clinic with c/o right shoulder pain since 2005.     Patient presents 6 months follow-up from right shoulder pain.  Previously saw Louisa, MRI was ordered and Mr. Barfield did not want to see a surgeon at that time.  He reports worsening pain, mostly at night.  Previously had a CSI with mild relief for 4 days.  Mild relief with physical therapy.  Patient presents ready to discuss surgical options with the surgeon today.  Denies any new trauma.    Previous HPI: He is here today for follow up right shoulder. This is a worker's comp injury. 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 The saw was stuck and he pulled on it then started to experience right shoulder pain. He was seen by ortho after injury and had MRI of shoulder scheduled. He did not complete MRI due to evacuating for [place]. While staying in [place] after [place], he was seen by a different orthopedic physician who felt that pain was coming from neck and had MRI of neck. He states that he was told that he needed neck surgery. 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 He then followed up with ortho in [place] upon his return and received epidural injections twice with some improvement of sx but still had intermittent shoulder pain throughout.    He saw Dr. Zamorano on 10/2/19 and was diagnosed with cervial pain and underwent MRI which showed spondylosis.  He then saw Ade Berumen PA-C who suggested that pain is coming from shoulder and to return back to see her for possible NICOLLE in future if continued radicular symptoms.     He has completed formal PT for 12 weeks for his shoulder with an increase in his ROM and strength and a  decrease in his pain. He would like to continue formal PT.  He received a steroid injection in his right shoulder on 10/25/19 with no benefits. He states that he does not take antiinflammatories because meloxicam has caused him to have elevated BP in the past. Pain is worse with lying on right shoulder and reaching behind him. He is not interested in surgery at this time and would like to continue formal PT. He reports pain not being constant anymore. Pain at its worst is 3/10.      Pain  Associated symptoms include neck pain. Pertinent negatives include no abdominal pain, anorexia, chest pain, chills, congestion, coughing, diaphoresis, fever, joint swelling, rash or sore throat.       Review of Systems   Constitution: Negative for chills, decreased appetite, diaphoresis, fever, malaise/fatigue, night sweats, weight gain and weight loss.   HENT: Negative for congestion, ear pain and sore throat.    Eyes: Negative for blurred vision, discharge, double vision, pain, photophobia and redness.   Cardiovascular: Negative for chest pain, claudication, cyanosis, dyspnea on exertion, irregular heartbeat and leg swelling.   Respiratory: Negative for cough, hemoptysis and shortness of breath.    Endocrine: Negative for cold intolerance and heat intolerance.   Skin: Negative for color change, dry skin, flushing, itching, nail changes and rash.   Musculoskeletal: Positive for joint pain and neck pain. Negative for arthritis, back pain, falls, gout and joint swelling.   Gastrointestinal: Negative for abdominal pain, anorexia and diarrhea.       Pain Related Questions  Over the past 3 days, what was your average pain during activity? (I.e. running, jogging, walking, climbing stairs, getting dressed, ect.): 4  Over the past 3 days, what was your highest pain level?: 4  Over the past 3 days, what was your lowest pain level? : 4    Other  How many nights a week are you awakened by your affected body part?: 7  Was the patient's HEIGHT  measured or patient reported?: Patient Reported  Was the patient's WEIGHT measured or patient reported?: Measured      Objective:        General: Marcos is well-developed, well-nourished, appears stated age, in no acute distress, alert and oriented to time, place and person.     General    Vitals reviewed.  Constitutional: He is oriented to person, place, and time. He appears well-developed and well-nourished. No distress.   Neck: Normal range of motion.   Cardiovascular: Normal rate and regular rhythm.    Pulmonary/Chest: Effort normal. No respiratory distress.   Neurological: He is alert and oriented to person, place, and time.   Psychiatric: He has a normal mood and affect. His behavior is normal. Thought content normal.         Back (L-Spine & T-Spine) / Neck (C-Spine) Exam     Tenderness   The patient is tender to palpation of the right trapezial.   Right Shoulder Exam     Inspection/Observation   Swelling: absent  Bruising: absent  Scars: absent  Deformity: absent  Scapular Winging: absent  Scapular Dyskinesia: negative  Atrophy: absent    Tenderness   The patient is tender to palpation of the biceps tendon.    Range of Motion   Active abduction:  110 abnormal   Passive abduction:  120 abnormal   Extension:  40 normal   Forward Flexion:  130 abnormal   Adduction: 50 normal  External Rotation 0 degrees:  40 normal   Internal rotation 0 degrees:  L2 (pain) abnormal     Tests & Signs   Apprehension: negative  Cross arm: positive  Drop arm: positive  Painter test: positive  Impingement: positive  Rotator Cuff Painful Arc/Range: mild  Anterosuperior Escape: negative  Lift Off Sign: positive  Belly Press: negative  Active Compression Test ([place]'s Sign): negative  Yergason's Test: negative  Speed's Test: positive  Anterior Drawer Test: 1+   Posterior Drawer Test: 1+    Other   Sensation: normal    Left Shoulder Exam     Inspection/Observation   Swelling: absent  Bruising: absent  Scars: absent  Deformity:  absent  Scapular Winging: absent  Scapular Dyskinesia: negative  Atrophy: absent    Tenderness   The patient is experiencing no tenderness.     Range of Motion   Active abduction:  120 normal   Passive abduction:  120 normal   Extension:  40 normal   Forward Flexion: 140   Adduction: 50 normal  External Rotation 0 degrees:  40 normal   Internal rotation 0 degrees:  T8 normal     Tests & Signs   Apprehension: negative  Cross arm: negative  Drop arm: negative  Painter test: negative  Impingement: negative  Anterosuperior Escape: negative  Lift Off Sign: negative  Belly Press: negative  Active Compression test ([place]'s Sign): negative  Yergasons's Test: negative  Speed's Test: negative  Anterior Drawer Test: 1+  Posterior Drawer Test: 1+    Other   Sensation: normal       Muscle Strength   Right Upper Extremity   Shoulder Abduction: 4/5 (4+)   Shoulder Internal Rotation: 5/5   Shoulder External Rotation: 5/5   Supraspinatus: 4/5 (4+)/5   Subscapularis: 5/5/5   Biceps: 5/5/5   Left Upper Extremity  Shoulder Abduction: 5/5   Shoulder Internal Rotation: 5/5   Shoulder External Rotation: 5/5   Supraspinatus: 5/5/5   Subscapularis: 5/5/5   Biceps: 5/5/5       RADIOGRAPHS:    Xray right shoulder (10/17/19):  Visualized osseous structures appear unremarkable, with no evidence of recent or healing fracture, lytic destructive process, or appreciable glenohumeral arthritic change noted.  No glenohumeral dislocation.  No abnormal soft tissue calcifications.    MRI cervical spine (10/8/19):  Cervical spondylosis, resulting in mild/ moderate neural foraminal narrowing from C2-C3 through C6-C7, as above.  No spinal canal stenosis.    MRI right shoulder: (10/23/19)  FINDINGS:  Rotator cuff: There is a high-grade partial thickness undersurface tear of the supraspinatus at the footprint, measuring 2.0 cm AP x 0.7 cm TV.  Infraspinatus, subscapularis, and teres minor tendons are intact.  Muscle bulk is maintained.    Labrum: Glenoid  labrum is intact.    Biceps: Long head biceps tendon is intact.    Bone: There is no fracture.  Bone marrow signal is unremarkable.    Acromioclavicular joint: Moderate arthrosis with subacromial spur.    Cartilage: Articular cartilage of the glenohumeral joint is preserved.    Miscellaneous: Thickening of subacromial subdeltoid bursa.  Nonspecific thickening of inferior glenohumeral ligament.              Assessment:       Encounter Diagnoses   Name Primary?    Traumatic incomplete tear of right rotator cuff, subsequent encounter Yes    Biceps tendonitis, right     DJD of right AC (acromioclavicular) joint           Plan:       1. ASES and SF-12 was not filled out today in clinic.     RTC in 4 weeks with Jaxson Pan PA-C for perioperative history and physical. Patient will fill out ASES, SF-12 on return.    2.  Dr. Mk Dennison was present in clinic today and directly involved in the decision making process of this clinic evaluation.  Patient was given opportunity to meet Dr. Dennison and asking questions related to surgery.  All questions were answered.    3. We reviewed with Marcos Barfield today, the pathology and natural history of his diagnosis. We have discussed a variety of treatment options including medications, physical therapy and other alternative treatments. I also explained the indications, risks and benefits of surgery. After discussion, he decided to proceed with surgery. The decision was made to go forward with      1. Right shoulder arthroscopic rotator cuff repair with subacromial decompression  2. Right shoulder arthroscopically assisted suprapectoral bicep tenodesis  3. Right shoulder arthroscopic distal clavicle excision  4. Right shoulder arthroscopic debridement    We also discussed, given their PMHx, medically optimization and [facility].  history of diabetes type 2    WC case     The details of the surgical procedure were explained, including the  location of probable incisions and a description of likely hardware and/or grafts to be used.  The patient understands the likely convalescence after surgery.  Also, we have thoroughly discussed the risks, benefits and alternatives to surgery, including, but not limited to, the risk of infection, joint stiffness, blood clot (including DVT and/or pulmonary embolus), neurologic and vascular injury.  It was explained that, if tissue has been repaired or reconstructed, there is a chance of failure, which may require further management.    I made the decision to obtain old records of the patient including previous notes and imaging.    3.  Physical therapy at [facility]    4.  Contraindication to NSAIDs due to blood pressure.    All of the patient's questions were answered and the patient will contact us if they have any questions or concerns in the interim.

## 2020-06-25 ENCOUNTER — TELEPHONE (OUTPATIENT)
Dept: SPORTS MEDICINE | Facility: CLINIC | Age: 70
End: 2020-06-25

## 2020-06-25 NOTE — TELEPHONE ENCOUNTER
Spoke to the patient regarding his WC status.    Samuel Tijerina   [phone] ph    The patient would like a date in July other than July 22nd.    Will follow up with pt tomorrow.w  ----- Message from Pascale Cox sent at 6/25/2020 11:22 AM CDT -----  Pt states you guys need to file for a 1010 form. This is regarding his worker's comp    Asking for a call back    Contact info [phone]

## 2020-06-29 ENCOUNTER — TELEPHONE (OUTPATIENT)
Dept: SPORTS MEDICINE | Facility: CLINIC | Age: 70
End: 2020-06-29

## 2020-06-29 DIAGNOSIS — M75.101 NONTRAUMATIC TEAR OF RIGHT ROTATOR CUFF, UNSPECIFIED TEAR EXTENT: Primary | ICD-10-CM

## 2020-06-29 DIAGNOSIS — S46.219A TEAR OF BICEPS TENDON: ICD-10-CM

## 2020-06-29 DIAGNOSIS — M19.011 ARTHRITIS OF RIGHT ACROMIOCLAVICULAR JOINT: ICD-10-CM

## 2020-06-29 NOTE — TELEPHONE ENCOUNTER
Spoke to the patient and rescheduled his surgery date to 8/13 with Dr. Dennison. Scheduled his covid test for 8/10 at 8a  ----- Message from Long Fontenot sent at 6/29/2020 11:14 AM CDT -----  Contact: pt  Please call pt at [phone]    Patient would like to discuss future surgery and changing the surgery date    Thank you

## 2020-07-06 ENCOUNTER — TELEPHONE (OUTPATIENT)
Dept: SPORTS MEDICINE | Facility: CLINIC | Age: 70
End: 2020-07-06

## 2020-07-06 NOTE — TELEPHONE ENCOUNTER
Spoke to patient about message below and rescheduled his surgery to 8/18. I also rescheduled his COVID 19 test to reflect within 72 hours of surgery.      ----- Message from Long Fontenot sent at 7/6/2020 10:02 AM CDT -----  Contact: pt  Please call pt at [phone]    Patient would like to change his future surgery date     Thank you

## 2020-07-27 ENCOUNTER — TELEPHONE (OUTPATIENT)
Dept: SPORTS MEDICINE | Facility: CLINIC | Age: 70
End: 2020-07-27

## 2020-07-27 NOTE — TELEPHONE ENCOUNTER
Spoke to patient about message below and confirmed it was approved.     ----- Message from Radha Moreland MA sent at 7/27/2020 11:51 AM CDT -----    ----- Message -----  From: Pascale Cox  Sent: 7/27/2020  10:54 AM CDT  To: Viviane Odom Staff    Pt calling to check the status of surgery was everything approved.    Contact [facility] [phone]

## 2020-08-03 ENCOUNTER — OFFICE VISIT (OUTPATIENT)
Dept: SPORTS MEDICINE | Facility: CLINIC | Age: 70
End: 2020-08-03
Payer: OTHER MISCELLANEOUS

## 2020-08-03 VITALS
DIASTOLIC BLOOD PRESSURE: 88 MMHG | HEART RATE: 65 BPM | BODY MASS INDEX: 24.2 KG/M2 | SYSTOLIC BLOOD PRESSURE: 174 MMHG | WEIGHT: 169 LBS | HEIGHT: 70 IN

## 2020-08-03 DIAGNOSIS — S46.219A TEAR OF BICEPS TENDON: ICD-10-CM

## 2020-08-03 DIAGNOSIS — M19.011 ARTHRITIS OF RIGHT ACROMIOCLAVICULAR JOINT: ICD-10-CM

## 2020-08-03 DIAGNOSIS — M75.101 NONTRAUMATIC TEAR OF RIGHT ROTATOR CUFF, UNSPECIFIED TEAR EXTENT: Primary | ICD-10-CM

## 2020-08-03 PROCEDURE — 99499 NO LOS: ICD-10-PCS | Mod: S$GLB,,, | Performed by: PHYSICIAN ASSISTANT

## 2020-08-03 PROCEDURE — 99499 UNLISTED E&M SERVICE: CPT | Mod: S$GLB,,, | Performed by: PHYSICIAN ASSISTANT

## 2020-08-03 PROCEDURE — 99999 PR PBB SHADOW E&M-EST. PATIENT-LVL IV: ICD-10-PCS | Mod: PBBFAC,,, | Performed by: PHYSICIAN ASSISTANT

## 2020-08-03 PROCEDURE — 99999 PR PBB SHADOW E&M-EST. PATIENT-LVL IV: CPT | Mod: PBBFAC,,, | Performed by: PHYSICIAN ASSISTANT

## 2020-08-03 RX ORDER — PROMETHAZINE HYDROCHLORIDE 25 MG/1
25 TABLET ORAL EVERY 4 HOURS PRN
Qty: 30 TABLET | Refills: 0 | Status: ON HOLD | OUTPATIENT
Start: 2020-08-03 | End: 2020-08-20

## 2020-08-03 RX ORDER — OXYCODONE AND ACETAMINOPHEN 10; 325 MG/1; MG/1
1 TABLET ORAL EVERY 6 HOURS PRN
Qty: 28 TABLET | Refills: 0 | Status: SHIPPED | OUTPATIENT
Start: 2020-08-03 | End: 2020-08-17

## 2020-08-03 RX ORDER — MUPIROCIN 20 MG/G
OINTMENT TOPICAL
Status: CANCELLED | OUTPATIENT
Start: 2020-08-03

## 2020-08-03 RX ORDER — SODIUM CHLORIDE 0.9 % (FLUSH) 0.9 %
10 SYRINGE (ML) INJECTION
Status: CANCELLED | OUTPATIENT
Start: 2020-08-03

## 2020-08-03 RX ORDER — ROPIVACAINE/EPI/CLONIDINE/KET 2.46-0.005
SYRINGE (ML) INJECTION ONCE
Status: CANCELLED | OUTPATIENT
Start: 2020-08-03 | End: 2020-08-03

## 2020-08-03 RX ORDER — CELECOXIB 200 MG/1
200 CAPSULE ORAL 2 TIMES DAILY WITH MEALS
Qty: 60 CAPSULE | Refills: 0 | Status: ON HOLD | OUTPATIENT
Start: 2020-08-03 | End: 2020-08-20

## 2020-08-03 RX ORDER — TRAMADOL HYDROCHLORIDE 50 MG/1
50 TABLET ORAL EVERY 6 HOURS PRN
Qty: 28 TABLET | Refills: 0 | Status: SHIPPED | OUTPATIENT
Start: 2020-08-03 | End: 2020-08-10

## 2020-08-03 RX ORDER — ASPIRIN 325 MG
TABLET ORAL
Qty: 42 TABLET | Refills: 0 | Status: ON HOLD | OUTPATIENT
Start: 2020-08-03 | End: 2020-08-20

## 2020-08-03 NOTE — H&P
Marcos Barfield  is here for a completion of his perioperative paperwork. he  Is scheduled to undergo     1. Right shoulder arthroscopic rotator cuff repair with subacromial decompression  2. Right shoulder arthroscopically assisted suprapectoral bicep tenodesis  3. Right shoulder arthroscopic distal clavicle excision  4. Right shoulder arthroscopic debridement on 8/18/2020.      He  does need clearance for this procedure.     Per PCP, clearance pending...    Risks, indications and benefits of the surgical procedure were discussed with the patient. All questions with regard to surgery, rehab, expected return to functional activities, activities of daily living and recreational endeavors were answered to his satisfaction.    Discussed COVID-19 with the patient, they are aware of our current policies and procedures, were given the option of delaying surgery, and they elect to proceed.    Patient was informed and understands the risks of surgery are greater for patients with a current condition or history of heart disease, obesity, clotting disorders, recurrent infections, steroid use, current or past smoking, and factors such as sedentary lifestyle and noncompliance with medications, therapy or follow-up. The degree of the increased risk is hard to estimate with any degree of precision.    Once no other questions were asked, a brief history and physical exam was then performed.    PAST MEDICAL HISTORY:   Past Medical History:   Diagnosis Date    Anemia     kid      PAST SURGICAL HISTORY: History reviewed. No pertinent surgical history.  FAMILY HISTORY: History reviewed. No pertinent family history.  SOCIAL HISTORY:   Social History     Socioeconomic History    Marital status:      Spouse name: Not on file    Number of children: Not on file    Years of education: Not on file    Highest education level: Not on file   Occupational History    Not on file   Social Needs    Financial resource strain: Not on file     Food insecurity     Worry: Not on file     Inability: Not on file    Transportation needs     Medical: Not on file     Non-medical: Not on file   Tobacco Use    Smoking status: Never Smoker    Smokeless tobacco: Never Used   Substance and Sexual Activity    Alcohol use: No    Drug use: No    Sexual activity: Yes   Lifestyle    Physical activity     Days per week: Not on file     Minutes per session: Not on file    Stress: Not on file   Relationships    Social connections     Talks on phone: Not on file     Gets together: Not on file     Attends Orthodox service: Not on file     Active member of club or organization: Not on file     Attends meetings of clubs or organizations: Not on file     Relationship status: Not on file   Other Topics Concern    Not on file   Social History Narrative    Not on file       MEDICATIONS:   Current Outpatient Medications:     aspirin (ECOTRIN) 81 MG EC tablet, Take 81 mg by mouth once daily.  , Disp: , Rfl:     doxycycline (VIBRAMYCIN) 100 MG Cap, Take 100 mg by mouth every 12 (twelve) hours., Disp: , Rfl:     fluticasone (FLONASE) 50 mcg/actuation nasal spray, , Disp: , Rfl: 0    gabapentin (NEURONTIN) 300 MG capsule, Take 300 mg by mouth 3 (three) times daily., Disp: , Rfl:     insulin glargine (LANTUS) 100 unit/mL injection, Inject 32 Units into the skin every evening.  , Disp: , Rfl:     lisinopril (PRINIVIL,ZESTRIL) 20 MG tablet, Take 20 mg by mouth once daily., Disp: , Rfl:     meclizine (ANTIVERT) 25 mg tablet, , Disp: , Rfl: 3    metformin (GLUCOPHAGE) 1000 MG tablet, Take 1,000 mg by mouth 2 (two) times daily with meals.  , Disp: , Rfl:     pravastatin (PRAVACHOL) 80 MG tablet, Take 80 mg by mouth once daily., Disp: , Rfl:     simvastatin (ZOCOR) 80 MG tablet, Take 80 mg by mouth every evening.  , Disp: , Rfl:   ALLERGIES:   Review of patient's allergies indicates:   Allergen Reactions    Meloxicam Other (See Comments)     Drove blood pressure james  high per pt       Review of Systems   Constitution: Negative. Negative for chills, fever and night sweats.   HENT: Negative for congestion and headaches.    Eyes: Negative for blurred vision, left vision loss and right vision loss.   Cardiovascular: Negative for chest pain and syncope.   Respiratory: Negative for cough and shortness of breath.    Endocrine: Negative for polydipsia, polyphagia and polyuria.   Hematologic/Lymphatic: Negative for bleeding problem. Does not bruise/bleed easily.   Skin: Negative for dry skin, itching and rash.   Musculoskeletal: Negative for falls and muscle weakness.   Gastrointestinal: Negative for abdominal pain and bowel incontinence.   Genitourinary: Negative for bladder incontinence and nocturia.   Neurological: Negative for disturbances in coordination, loss of balance and seizures.   Psychiatric/Behavioral: Negative for depression. The patient does not have insomnia.    Allergic/Immunologic: Negative for hives and persistent infections.     PHYSICAL EXAM:  GEN: A&Ox3, WD WN NAD  HEENT: WNL  CHEST: CTAB, no W/R/R  HEART: RRR, no M/R/G   ABD: Soft, NT ND, BS x4 QUADS  MS: Refer to previous note for detailed MS exam  NEURO: CN II-XII intact       The surgical consent was then reviewed with the patient, who agreed with all the contents of the consent form and it was signed. he was instructed to wait for a phone call from the anesthesia department prior to surgery to discuss past medical history, medications, and clearance. Also, informed he may be required to get additional testing per the anesthesia department prior to having surgery.     PHYSICAL THERAPY:  He was also instructed regarding physical therapy and will begin POD # 1-3. He is doing physical therapy at [facility].    POST OP CARE:instructions were reviewed including care of the wound and dressing after surgery and when he can shower.     PAIN MANAGEMENT: Marcos Barfield was also given a pain  management regime, which includes the TENS unit given to him by Saurav Prasad along with the education required for its use. He was also instructed regarding the Polar ice unit that will be in place after surgery and his postoperative pain medications.     PAIN MEDICATION:  Percocet 10/325mg 1 po q 4-6 hours prn pain  Ultram 50 mg one p.o. q.4-6 hours p.r.n. breakthrough pain,   Phenergan 25 mg one p.o. q.4-6 hours p.r.n. nausea and vomiting.  Celebrex 200 mg BID  Aspirin 325mg daily x 6 weeks for DVT prophylaxis starting on the evening after surgery.    Post op meds to be delivered bedside prior to discharge. Deliver to family if patient is in surgery at 5pm.   Patient denies history of seizures.     Patient will also use bilateral TEDs on lower extremities, SCDs during surgery, and early ambulation post-op. If the patient was previously taking 81mg baby aspirin, they were told to not take it will using the above stated aspirin and to restart the 81mg aspirin after completion of the aspirin dose.       Patient was also told to buy over the counter Prilosec medication and take it once daily for GI protection as long as they are taking NSAIDs or Aspirin.    DVT prophylaxis was discussed with the patient today including risk factors for developing DVTs and history of DVTs. The patient was asked if any specific recommendations were given from the doctor/s that did pre-operative surgical clearance.      Patient was asked if they were taking or using OCP pills or devices. If they answered yes, then they were instructed to stop using OCPs at this pre-operative appointment until 2 months post-op to help prevent DVT development. They understand that there are other forms of birth control that do not involve hormones. They expressed understanding that ignoring/not following this instruction could result in a DVT which could turn into a deadly pulmonary embolism.      The patient was told that narcotic pain medications may make  them drowsy and instructions were given to not sign legal documents, drive or operate heavy machinery, cars, or equipment while under the influence of narcotic medications.     As there were no other questions to be asked, he was given my business card along with Mk Dennison MD business card if he has any questions or concerns prior to surgery or in the postop period.

## 2020-08-05 NOTE — PROGRESS NOTES
Mr. Barfield shoulder pain has progressed to the point which she wants surgery.  I did not perform shoulder surgery and have that done so for at least 13 years.  He told me that he preferred to see Dr. Sherman but for some reason was sent to Dr. Dennison.  I explained that although in my opinion both are good surgeons if he prefers to see Dr. Sherman I see no reason why that request can not be accommodated.

## 2020-08-06 ENCOUNTER — TELEPHONE (OUTPATIENT)
Dept: SPORTS MEDICINE | Facility: CLINIC | Age: 70
End: 2020-08-06

## 2020-08-06 NOTE — TELEPHONE ENCOUNTER
Spoke to patient about message below. Patient was not specific, but he stated that he is just not comfortable with having the surgery right now.     ----- Message from Pascale Cox sent at 8/6/2020  9:18 AM CDT -----  Pt need to cancel surgery. Asking for a call back.      Contact info [phone]

## 2020-08-07 ENCOUNTER — TELEPHONE (OUTPATIENT)
Dept: SPORTS MEDICINE | Facility: CLINIC | Age: 70
End: 2020-08-07

## 2020-08-07 NOTE — TELEPHONE ENCOUNTER
----- Message from Rasta Chavez sent at 8/7/2020  1:12 PM CDT -----  Contact: self    Pt would like to speak with the nurse concerning his surgery     Please Call    Contact  [phone]

## 2020-08-07 NOTE — TELEPHONE ENCOUNTER
Spoke to patient and explained his process of moving surgeons. Patient will call back if he is able to.

## 2020-08-12 ENCOUNTER — OFFICE VISIT (OUTPATIENT)
Dept: SPORTS MEDICINE | Facility: CLINIC | Age: 70
End: 2020-08-12
Payer: OTHER MISCELLANEOUS

## 2020-08-12 VITALS
HEART RATE: 66 BPM | WEIGHT: 169 LBS | SYSTOLIC BLOOD PRESSURE: 167 MMHG | BODY MASS INDEX: 24.2 KG/M2 | HEIGHT: 70 IN | DIASTOLIC BLOOD PRESSURE: 82 MMHG

## 2020-08-12 DIAGNOSIS — Z01.818 PRE-OP TESTING: Primary | ICD-10-CM

## 2020-08-12 DIAGNOSIS — S46.011D TRAUMATIC INCOMPLETE TEAR OF RIGHT ROTATOR CUFF, SUBSEQUENT ENCOUNTER: Primary | ICD-10-CM

## 2020-08-12 PROCEDURE — 99214 OFFICE O/P EST MOD 30 MIN: CPT | Mod: S$GLB,,, | Performed by: ORTHOPAEDIC SURGERY

## 2020-08-12 PROCEDURE — 99999 PR PBB SHADOW E&M-EST. PATIENT-LVL III: ICD-10-PCS | Mod: PBBFAC,,, | Performed by: ORTHOPAEDIC SURGERY

## 2020-08-12 PROCEDURE — 99999 PR PBB SHADOW E&M-EST. PATIENT-LVL III: CPT | Mod: PBBFAC,,, | Performed by: ORTHOPAEDIC SURGERY

## 2020-08-12 PROCEDURE — 99214 PR OFFICE/OUTPT VISIT, EST, LEVL IV, 30-39 MIN: ICD-10-PCS | Mod: S$GLB,,, | Performed by: ORTHOPAEDIC SURGERY

## 2020-08-12 NOTE — PROGRESS NOTES
CC: right Shoulder pain    Referred to me by his niece Mariia    - retired.     DOI was 2005 - has been managing pain with conservative management.  / injured on the job - forced to retire secondary to injury.      70 y.o. Male rhd reports that the pain is severe and not responding to any conservative care.   Pt had previously been seen by  and was scheduled for surgery however pt would like now to be evaluated by  for surgery.    Denies new injury / paresthesias, new weakness.     He reports that the pain is worse with overhead activity. It also bothers him at night.    Is affecting ADLs.     Failed physician directed therapy x 12 weeks  Failed NSAIDs x 6 weeks  Failed RICE x 6 weeks    SANE 50      PAST MEDICAL HISTORY:   Past Medical History:   Diagnosis Date    Anemia     kid      PAST SURGICAL HISTORY: History reviewed. No pertinent surgical history.  FAMILY HISTORY: No family history on file.  SOCIAL HISTORY:   Social History     Socioeconomic History    Marital status:      Spouse name: Not on file    Number of children: Not on file    Years of education: Not on file    Highest education level: Not on file   Occupational History    Not on file   Social Needs    Financial resource strain: Not on file    Food insecurity     Worry: Not on file     Inability: Not on file    Transportation needs     Medical: Not on file     Non-medical: Not on file   Tobacco Use    Smoking status: Never Smoker    Smokeless tobacco: Never Used   Substance and Sexual Activity    Alcohol use: No    Drug use: No    Sexual activity: Yes   Lifestyle    Physical activity     Days per week: Not on file     Minutes per session: Not on file    Stress: Not on file   Relationships    Social connections     Talks on phone: Not on file     Gets together: Not on file     Attends Synagogue service: Not on file     Active member of club or organization: Not on file     Attends meetings of clubs  "or organizations: Not on file     Relationship status: Not on file   Other Topics Concern    Not on file   Social History Narrative    Not on file     MEDICATIONS:   Current Outpatient Medications:     aspirin (ECOTRIN) 81 MG EC tablet, Take 81 mg by mouth once daily.  , Disp: , Rfl:     aspirin 325 MG tablet, Take 1 tablet at lunch daily starting after surgery for 6 weeks to prevent DVT., Disp: 42 tablet, Rfl: 0    celecoxib (CELEBREX) 200 MG capsule, Take 1 capsule (200 mg total) by mouth 2 (two) times daily with meals. (breakfast and dinner), Disp: 60 capsule, Rfl: 0    doxycycline (VIBRAMYCIN) 100 MG Cap, Take 100 mg by mouth every 12 (twelve) hours., Disp: , Rfl:     fluticasone (FLONASE) 50 mcg/actuation nasal spray, , Disp: , Rfl: 0    gabapentin (NEURONTIN) 300 MG capsule, Take 300 mg by mouth 3 (three) times daily., Disp: , Rfl:     insulin glargine (LANTUS) 100 unit/mL injection, Inject 32 Units into the skin every evening.  , Disp: , Rfl:     lisinopril (PRINIVIL,ZESTRIL) 20 MG tablet, Take 20 mg by mouth once daily., Disp: , Rfl:     meclizine (ANTIVERT) 25 mg tablet, , Disp: , Rfl: 3    metformin (GLUCOPHAGE) 1000 MG tablet, Take 1,000 mg by mouth 2 (two) times daily with meals.  , Disp: , Rfl:     oxyCODONE-acetaminophen (PERCOCET)  mg per tablet, Take 1 tablet by mouth every 6 (six) hours as needed for Pain., Disp: 28 tablet, Rfl: 0    pravastatin (PRAVACHOL) 80 MG tablet, Take 80 mg by mouth once daily., Disp: , Rfl:     promethazine (PHENERGAN) 25 MG tablet, Take 1 tablet (25 mg total) by mouth every 4 (four) hours as needed for Nausea., Disp: 30 tablet, Rfl: 0    simvastatin (ZOCOR) 80 MG tablet, Take 80 mg by mouth every evening.  , Disp: , Rfl:   ALLERGIES:   Review of patient's allergies indicates:   Allergen Reactions    Meloxicam Other (See Comments)     Drove blood pressure james high per pt     VITAL SIGNS: BP (!) 167/82   Pulse 66   Ht 5' 10" (1.778 m)   Wt 76.7 kg " (169 lb)   BMI 24.25 kg/m²     Review of Systems   Constitution: Negative. Negative for chills, fever and night sweats.   HENT: Negative for congestion and headaches.    Eyes: Negative for blurred vision, left vision loss and right vision loss.   Cardiovascular: Negative for chest pain and syncope.   Respiratory: Negative for cough and shortness of breath.    Endocrine: Negative for polydipsia, polyphagia and polyuria.   Hematologic/Lymphatic: Negative for bleeding problem. Does not bruise/bleed easily.   Skin: Negative for dry skin, itching and rash.   Musculoskeletal: Negative for falls and muscle weakness.   Gastrointestinal: Negative for abdominal pain and bowel incontinence.   Genitourinary: Negative for bladder incontinence and nocturia.   Neurological: Negative for disturbances in coordination, loss of balance and seizures.   Psychiatric/Behavioral: Negative for depression. The patient does not have insomnia.    Allergic/Immunologic: Negative for hives and persistent infections.       PHYSICAL EXAMINATION:  General:  The patient is alert and oriented x 3.  Mood is pleasant.  Observation of ears, eyes and nose reveal no gross abnormalities.  HEENT: NCAT, sclera nonicteric  Lungs: Respirations are equal and unlabored.  Gait is coordinated. Patient can toe walk and heel walk without difficulty.  Cardiovascular: There are no swelling or varicosities present.   Lymphatic: Negative for adenopathy       right Shoulder / Upper Extremity Exam    OBSERVATION:     Swelling  none  Deformity  none   Discoloration  none   Scapular winging none   Scars   none  Atrophy  none    TENDERNESS / CREPITUS (T/C):          T/C      T/C   Clavicle   -/-  SUPRAspinatus    -/-     AC Jt.    +  INFRAspinatus  -/-     SC Jt.    -/-  Deltoid    -/-     G. Tuberosity  -/-  LH BICEP groove  +/-   Acromion:  -/-  Midline Neck   -/-     Scapular Spine -/-  Trapezium   -/-   SMA Scapula  -/-  GH jt. line - post  -/-     Scapulothoracic   -/-         ROM: (* = with pain)  Right shoulder   Left shoulder        AROM (PROM)   AROM (PROM)   FE    170° (175°)     170° (175°)     ER at 0°    60°  (65°)    60°  (65°)   ER at 90° ABD  90°  (90°)    90°  (90°)   IR at 90°  ABD   NA  (40°)     NA  (40°)      IR (spine level)   T10     T10    STRENGTH: (* = with pain) RIGHT SHOULDER  LEFT SHOULDER   SCAPTION at 0   4/5    5/5    SCAPTION at 30   5/5    5/5   IR    5/5    5/5   ER    5/5    5/5   BICEPS   5/5    5/5   Deltoid    5/5    5/5     SIGNS:  Painful side       NEER   +   OBELKISS  neg     SHAW   +   SPEEDS  +     DROP ARM   neg   BELLY PRESS neg   Superior escape none    LIFT-OFF  neg   X-Body ADD    +   MOVING VALGUS Neg        STABILITY TESTING    RIGHT SHOULDER   LEFT SHOULDER     Translation     Anterior  up face     up face     Posterior  up face    up face    Sulcus   < 10mm    < 10 mm     Signs    Apprehension   neg      neg      Relocation   no change     no change     Jerk test  neg     neg    EXTREMITY NEURO-VASCULAR EXAM    Sensation grossly intact to light touch all dermatomal regions.    DTR 2+ Biceps, Triceps, BR and Negative Romeos sign   Grossly intact motor function at Elbow, Wrist and Hand   Distal pulses radial and ulnar 2+, brisk cap refill, symmetric.      NECK:  Painless FROM and spinous processes non-tender. Negative Spurlings sign.      OTHER FINDINGS:  +scapular dyskinesia    XRAYS:  Shoulder trauma series right,  were ordered and reviewed by me. No convincing fracture or dislocation is noted. The osseous structures appear well mineralized and well aligned,AC moderate hypertrophy, degenerative changes    right   1. Shoulder pain,possible RTC tear    2.  Possible insufficiency fracture greater tuberosity    MRI Right shoulder: High grade partial thickiness supraspinatus tear without atrophy or retraction. SLAP, biceps tendinitis, + insufficiency fracture greater tuberosity  AC moderate hypertrophy, degenerative  changes    ASSESSMENT:    Right Shoulder Rotator Cuff Tear, SLAP, biceps tendonitis.      he would benefit from an arthroscopy, given the above.       PLAN:   Right Shoulder rotator cuff tear     All questions were answered, pt will contact us for questions or concerns in the interim.  Had thorough discussion of non-operative vs operative intervention, and risks and benefits of both.     We have discussed the surgery and recovery of arthroscopic shoulder surgery. he understands that there may be limited mobility up to several weeks after surgery depending on procedures that are performed at the time of surgery.    The spectrum of treatment options were discussed with the patient, including nonoperative and operative options.  After thorough discussion, the patient has elected to undergo surgical treatment to include:    right   a. Shoulder arthroscopic rotator cuff repair   b. Shoulder arthroscopic SAD   c. Shoulder arthroscopic extensive debridement   d. Shoulder arthroscopic biceps tenodesis   e. Shoulder arthroscopic possible microfracture   f.  Shoulder arthroscopic DCE   g. Shoulder open reduction internal fixation greater tuberosity    The details of the surgical procedure were explained, including the location of probable incisions and a description of likely hardware and/or grafts to be used.  The patient understands the likely convalescence after surgery.  Also, we have thoroughly discussed the risks, benefits and alternatives to surgery, including, but not limited to, the risk of infection, joint stiffness, blood clot (including DVT and/or pulmonary embolus), neurologic and vascular injury.  It was explained that, if tissue has been repaired or reconstructed, there is a chance of failure, which may require further management.  Consent form for surgery is signed and in chart.

## 2020-08-13 DIAGNOSIS — M75.21 BICEPS TENDINITIS OF RIGHT UPPER EXTREMITY: ICD-10-CM

## 2020-08-13 DIAGNOSIS — S42.254A CLOSED NONDISPLACED FRACTURE OF GREATER TUBEROSITY OF RIGHT HUMERUS, INITIAL ENCOUNTER: ICD-10-CM

## 2020-08-13 DIAGNOSIS — S43.431A SUPERIOR GLENOID LABRUM LESION OF RIGHT SHOULDER, INITIAL ENCOUNTER: ICD-10-CM

## 2020-08-13 DIAGNOSIS — S46.011A TRAUMATIC TEAR OF RIGHT ROTATOR CUFF, UNSPECIFIED TEAR EXTENT, INITIAL ENCOUNTER: Primary | ICD-10-CM

## 2020-08-13 DIAGNOSIS — M19.011 ARTHRITIS OF RIGHT ACROMIOCLAVICULAR JOINT: ICD-10-CM

## 2020-08-17 ENCOUNTER — OFFICE VISIT (OUTPATIENT)
Dept: SPORTS MEDICINE | Facility: CLINIC | Age: 70
End: 2020-08-17
Payer: OTHER MISCELLANEOUS

## 2020-08-17 ENCOUNTER — LAB VISIT (OUTPATIENT)
Dept: SPORTS MEDICINE | Facility: CLINIC | Age: 70
End: 2020-08-17
Payer: OTHER MISCELLANEOUS

## 2020-08-17 VITALS
WEIGHT: 169 LBS | BODY MASS INDEX: 24.2 KG/M2 | HEIGHT: 70 IN | HEART RATE: 68 BPM | SYSTOLIC BLOOD PRESSURE: 159 MMHG | DIASTOLIC BLOOD PRESSURE: 83 MMHG

## 2020-08-17 DIAGNOSIS — M75.21 BICEPS TENDINITIS OF RIGHT UPPER EXTREMITY: ICD-10-CM

## 2020-08-17 DIAGNOSIS — S46.011A TRAUMATIC TEAR OF RIGHT ROTATOR CUFF, UNSPECIFIED TEAR EXTENT, INITIAL ENCOUNTER: Primary | ICD-10-CM

## 2020-08-17 DIAGNOSIS — Z01.818 PRE-OP TESTING: ICD-10-CM

## 2020-08-17 DIAGNOSIS — G89.18 POST-OPERATIVE PAIN: ICD-10-CM

## 2020-08-17 PROCEDURE — 99999 PR PBB SHADOW E&M-EST. PATIENT-LVL IV: ICD-10-PCS | Mod: PBBFAC,,, | Performed by: PHYSICIAN ASSISTANT

## 2020-08-17 PROCEDURE — 99999 PR PBB SHADOW E&M-EST. PATIENT-LVL IV: CPT | Mod: PBBFAC,,, | Performed by: PHYSICIAN ASSISTANT

## 2020-08-17 PROCEDURE — 99499 UNLISTED E&M SERVICE: CPT | Mod: S$GLB,,, | Performed by: PHYSICIAN ASSISTANT

## 2020-08-17 PROCEDURE — 99499 NO LOS: ICD-10-PCS | Mod: S$GLB,,, | Performed by: PHYSICIAN ASSISTANT

## 2020-08-17 PROCEDURE — U0003 INFECTIOUS AGENT DETECTION BY NUCLEIC ACID (DNA OR RNA); SEVERE ACUTE RESPIRATORY SYNDROME CORONAVIRUS 2 (SARS-COV-2) (CORONAVIRUS DISEASE [COVID-19]), AMPLIFIED PROBE TECHNIQUE, MAKING USE OF HIGH THROUGHPUT TECHNOLOGIES AS DESCRIBED BY CMS-2020-01-R: HCPCS

## 2020-08-17 RX ORDER — TRAMADOL HYDROCHLORIDE 50 MG/1
50-100 TABLET ORAL EVERY 6 HOURS PRN
Qty: 21 TABLET | Refills: 0 | Status: SHIPPED | OUTPATIENT
Start: 2020-08-17 | End: 2024-03-03

## 2020-08-17 RX ORDER — ONDANSETRON 4 MG/1
4 TABLET, FILM COATED ORAL EVERY 8 HOURS PRN
Qty: 12 TABLET | Refills: 0 | Status: SHIPPED | OUTPATIENT
Start: 2020-08-17

## 2020-08-17 RX ORDER — OXYCODONE AND ACETAMINOPHEN 10; 325 MG/1; MG/1
TABLET ORAL
Qty: 21 TABLET | Refills: 0 | Status: SHIPPED | OUTPATIENT
Start: 2020-08-17 | End: 2024-03-03

## 2020-08-18 LAB — SARS-COV-2 RNA RESP QL NAA+PROBE: NOT DETECTED

## 2020-08-18 NOTE — ANESTHESIA PAT ROS NOTE
08/18/2020  Marcos Barfield is a 70 y.o., male.      Pre-op Assessment          Review of Systems  Anesthesia Hx:  No problems with previous Anesthesia    Social:  Non-Smoker, No Alcohol Use    Hematology/Oncology:  Hematology Normal   Oncology Normal     Cardiovascular:   Exercise tolerance: good Hypertension hyperlipidemia    Pulmonary:  Pulmonary Normal  Denies Shortness of breath.  Denies Recent URI.    Renal/:   Chronic Renal Disease CKD 3   Hepatic/GI:  Hepatic/GI Normal    Musculoskeletal:  Musculoskeletal Normal    Neurological:  Neurology Normal    Endocrine:   Diabetes, type 2, using insulin Insulin and metformin   Psych:  Psychiatric Normal              Anesthesia Assessment: Preoperative EQUATION    Planned Procedure: Procedure(s) (LRB):  REPAIR, ROTATOR CUFF, ARTHROSCOPIC (Right)  FIXATION, TENDON, Biceps Tenodesis (Right)  DEBRIDEMENT, SHOULDER, ARTHROSCOPIC (Right)  ARTHROSCOPY, SHOULDER, WITH DISTAL CLAVICLE EXCISION (Right)  ORIF, FRACTURE, HUMERUS (Right)  MICROFRACTURE (Right)  Requested Anesthesia Type:General  Surgeon: Kayleen Sherman MD  Service: Orthopedics  Known or anticipated Date of Surgery:8/20/2020    Surgeon notes: reviewed    Electronic QUestionnaire Assessment completed via nurse interview with patient.        Triage considerations:     The patient has no apparent active cardiac condition (No unstable coronary Syndrome such as severe unstable angina or recent [<1 month] myocardial infarction, decompensated CHF, severe valvular   disease or significant arrhythmia)    Previous anesthesia records:None on file but bi problems reoirted    Last PCP note: within 1 month , outside [facility] scanned in media    Other important co-morbidities  : DM2, HLD and HTN      Tests already available:  scanned in media A1C 6.5 BUN/CR 28/1.7 GFR 39 EKG             Instructions given. (See in Nurse's  note  Optimization: Medical Opinion Indicated scanned             Covid pending     Patient  has previously scheduled Medical Appointment:    Navigation: Tests completed.             Results will be tracked by Preop Clinic.

## 2020-08-19 RX ORDER — SODIUM CHLORIDE 9 MG/ML
INJECTION, SOLUTION INTRAVENOUS CONTINUOUS
Status: CANCELLED | OUTPATIENT
Start: 2020-08-19

## 2020-08-20 ENCOUNTER — HOSPITAL ENCOUNTER (OUTPATIENT)
Facility: HOSPITAL | Age: 70
Discharge: HOME | End: 2020-08-20
Attending: ORTHOPAEDIC SURGERY | Admitting: ORTHOPAEDIC SURGERY
Payer: OTHER MISCELLANEOUS

## 2020-08-20 ENCOUNTER — ANESTHESIA (OUTPATIENT)
Dept: SURGERY | Facility: HOSPITAL | Age: 70
End: 2020-08-20
Payer: OTHER MISCELLANEOUS

## 2020-08-20 ENCOUNTER — ANESTHESIA EVENT (OUTPATIENT)
Dept: SURGERY | Facility: HOSPITAL | Age: 70
End: 2020-08-20
Payer: OTHER MISCELLANEOUS

## 2020-08-20 VITALS
RESPIRATION RATE: 16 BRPM | OXYGEN SATURATION: 100 % | BODY MASS INDEX: 24.2 KG/M2 | TEMPERATURE: 98 F | HEIGHT: 70 IN | DIASTOLIC BLOOD PRESSURE: 72 MMHG | SYSTOLIC BLOOD PRESSURE: 152 MMHG | WEIGHT: 169 LBS | HEART RATE: 60 BPM

## 2020-08-20 DIAGNOSIS — M75.21 BICEPS TENDINITIS OF RIGHT UPPER EXTREMITY: ICD-10-CM

## 2020-08-20 DIAGNOSIS — S46.011S TRAUMATIC COMPLETE TEAR OF RIGHT ROTATOR CUFF, SEQUELA: Primary | ICD-10-CM

## 2020-08-20 DIAGNOSIS — S46.011A TRAUMATIC TEAR OF RIGHT ROTATOR CUFF, UNSPECIFIED TEAR EXTENT, INITIAL ENCOUNTER: ICD-10-CM

## 2020-08-20 LAB
POCT GLUCOSE: 151 MG/DL (ref 70–110)
POCT GLUCOSE: 85 MG/DL (ref 70–110)

## 2020-08-20 PROCEDURE — 29828 PR ARTHROSCOPY SHOULDER SURGICAL BICEPS TENODESIS: ICD-10-PCS | Mod: 52,51,RT, | Performed by: ORTHOPAEDIC SURGERY

## 2020-08-20 PROCEDURE — 36000710: Performed by: ORTHOPAEDIC SURGERY

## 2020-08-20 PROCEDURE — 63600175 PHARM REV CODE 636 W HCPCS: Performed by: NURSE ANESTHETIST, CERTIFIED REGISTERED

## 2020-08-20 PROCEDURE — 29826 PR SHLDR ARTHROSCOP,PART ACROMIOPLAS: ICD-10-PCS | Mod: RT,,, | Performed by: ORTHOPAEDIC SURGERY

## 2020-08-20 PROCEDURE — 63600175 PHARM REV CODE 636 W HCPCS: Performed by: ANESTHESIOLOGY

## 2020-08-20 PROCEDURE — 37000008 HC ANESTHESIA 1ST 15 MINUTES: Performed by: ORTHOPAEDIC SURGERY

## 2020-08-20 PROCEDURE — 71000039 HC RECOVERY, EACH ADD'L HOUR: Performed by: ORTHOPAEDIC SURGERY

## 2020-08-20 PROCEDURE — 27201423 OPTIME MED/SURG SUP & DEVICES STERILE SUPPLY: Performed by: ORTHOPAEDIC SURGERY

## 2020-08-20 PROCEDURE — 37000009 HC ANESTHESIA EA ADD 15 MINS: Performed by: ORTHOPAEDIC SURGERY

## 2020-08-20 PROCEDURE — 25000003 PHARM REV CODE 250: Performed by: PHYSICIAN ASSISTANT

## 2020-08-20 PROCEDURE — 29824 PR SHLDR ARTHROSCOP,SURG,DIS CLAVICULECTOMY: ICD-10-PCS | Mod: 51,RT,, | Performed by: ORTHOPAEDIC SURGERY

## 2020-08-20 PROCEDURE — D9220A PRA ANESTHESIA: Mod: ,,, | Performed by: ANESTHESIOLOGY

## 2020-08-20 PROCEDURE — 29999 UNLISTED PX ARTHROSCOPY: CPT | Mod: ,,, | Performed by: ORTHOPAEDIC SURGERY

## 2020-08-20 PROCEDURE — 36000711: Performed by: ORTHOPAEDIC SURGERY

## 2020-08-20 PROCEDURE — 25000003 PHARM REV CODE 250: Performed by: NURSE ANESTHETIST, CERTIFIED REGISTERED

## 2020-08-20 PROCEDURE — 29827 SHO ARTHRS SRG RT8TR CUF RPR: CPT | Mod: RT,,, | Performed by: ORTHOPAEDIC SURGERY

## 2020-08-20 PROCEDURE — 29827 PR SHLDR ARTHROSCOP,SURG,W/ROTAT CUFF REPR: ICD-10-PCS | Mod: RT,,, | Performed by: ORTHOPAEDIC SURGERY

## 2020-08-20 PROCEDURE — 99900035 HC TECH TIME PER 15 MIN (STAT)

## 2020-08-20 PROCEDURE — 71000015 HC POSTOP RECOV 1ST HR: Performed by: ORTHOPAEDIC SURGERY

## 2020-08-20 PROCEDURE — 63600175 PHARM REV CODE 636 W HCPCS: Performed by: ORTHOPAEDIC SURGERY

## 2020-08-20 PROCEDURE — D9220A PRA ANESTHESIA: ICD-10-PCS | Mod: ,,, | Performed by: ANESTHESIOLOGY

## 2020-08-20 PROCEDURE — 29828 SHO ARTHRS SRG BICP TENODSIS: CPT | Mod: 52,51,RT, | Performed by: ORTHOPAEDIC SURGERY

## 2020-08-20 PROCEDURE — 25000003 PHARM REV CODE 250: Performed by: ORTHOPAEDIC SURGERY

## 2020-08-20 PROCEDURE — 71000033 HC RECOVERY, INTIAL HOUR: Performed by: ORTHOPAEDIC SURGERY

## 2020-08-20 PROCEDURE — 25000003 PHARM REV CODE 250: Performed by: STUDENT IN AN ORGANIZED HEALTH CARE EDUCATION/TRAINING PROGRAM

## 2020-08-20 PROCEDURE — 29824 SHO ARTHRS SRG DSTL CLAVICLC: CPT | Mod: 51,RT,, | Performed by: ORTHOPAEDIC SURGERY

## 2020-08-20 PROCEDURE — 29999 PR ARTHROSCOPIC MICROFRACTURE, ALL JOINTS: ICD-10-PCS | Mod: ,,, | Performed by: ORTHOPAEDIC SURGERY

## 2020-08-20 PROCEDURE — 29826 SHO ARTHRS SRG DECOMPRESSION: CPT | Mod: RT,,, | Performed by: ORTHOPAEDIC SURGERY

## 2020-08-20 PROCEDURE — 94761 N-INVAS EAR/PLS OXIMETRY MLT: CPT

## 2020-08-20 PROCEDURE — C1713 ANCHOR/SCREW BN/BN,TIS/BN: HCPCS | Performed by: ORTHOPAEDIC SURGERY

## 2020-08-20 PROCEDURE — 82962 GLUCOSE BLOOD TEST: CPT | Performed by: ORTHOPAEDIC SURGERY

## 2020-08-20 PROCEDURE — 63600175 PHARM REV CODE 636 W HCPCS: Performed by: STUDENT IN AN ORGANIZED HEALTH CARE EDUCATION/TRAINING PROGRAM

## 2020-08-20 DEVICE — ANCHOR BIOCOMP SWVLLOK: Type: IMPLANTABLE DEVICE | Site: SHOULDER | Status: FUNCTIONAL

## 2020-08-20 DEVICE — ANCHOR SU BC CORKSCREW 5.5MM: Type: IMPLANTABLE DEVICE | Site: SHOULDER | Status: FUNCTIONAL

## 2020-08-20 RX ORDER — TRAMADOL HYDROCHLORIDE 50 MG/1
100 TABLET ORAL ONCE
Status: COMPLETED | OUTPATIENT
Start: 2020-08-20 | End: 2020-08-20

## 2020-08-20 RX ORDER — DEXAMETHASONE SODIUM PHOSPHATE 4 MG/ML
INJECTION, SOLUTION INTRA-ARTICULAR; INTRALESIONAL; INTRAMUSCULAR; INTRAVENOUS; SOFT TISSUE
Status: DISCONTINUED | OUTPATIENT
Start: 2020-08-20 | End: 2020-08-20

## 2020-08-20 RX ORDER — FENTANYL CITRATE 50 UG/ML
INJECTION, SOLUTION INTRAMUSCULAR; INTRAVENOUS
Status: DISCONTINUED | OUTPATIENT
Start: 2020-08-20 | End: 2020-08-20

## 2020-08-20 RX ORDER — PROPOFOL 10 MG/ML
VIAL (ML) INTRAVENOUS
Status: DISCONTINUED | OUTPATIENT
Start: 2020-08-20 | End: 2020-08-20

## 2020-08-20 RX ORDER — DEXMEDETOMIDINE HYDROCHLORIDE 100 UG/ML
INJECTION, SOLUTION INTRAVENOUS
Status: DISCONTINUED | OUTPATIENT
Start: 2020-08-20 | End: 2020-08-20

## 2020-08-20 RX ORDER — NEOSTIGMINE METHYLSULFATE 1 MG/ML
INJECTION, SOLUTION INTRAVENOUS
Status: DISCONTINUED | OUTPATIENT
Start: 2020-08-20 | End: 2020-08-20

## 2020-08-20 RX ORDER — FENTANYL CITRATE 50 UG/ML
25 INJECTION, SOLUTION INTRAMUSCULAR; INTRAVENOUS EVERY 5 MIN PRN
Status: DISCONTINUED | OUTPATIENT
Start: 2020-08-20 | End: 2020-08-20 | Stop reason: HOSPADM

## 2020-08-20 RX ORDER — OXYCODONE HYDROCHLORIDE 5 MG/1
10 TABLET ORAL EVERY 4 HOURS PRN
Status: DISCONTINUED | OUTPATIENT
Start: 2020-08-20 | End: 2020-08-20 | Stop reason: HOSPADM

## 2020-08-20 RX ORDER — CLINDAMYCIN PHOSPHATE 900 MG/50ML
900 INJECTION, SOLUTION INTRAVENOUS
Status: COMPLETED | OUTPATIENT
Start: 2020-08-20 | End: 2020-08-20

## 2020-08-20 RX ORDER — KETAMINE HYDROCHLORIDE 100 MG/ML
INJECTION, SOLUTION INTRAMUSCULAR; INTRAVENOUS
Status: DISCONTINUED | OUTPATIENT
Start: 2020-08-20 | End: 2020-08-20

## 2020-08-20 RX ORDER — LIDOCAINE HYDROCHLORIDE 20 MG/ML
INJECTION INTRAVENOUS
Status: DISCONTINUED | OUTPATIENT
Start: 2020-08-20 | End: 2020-08-20

## 2020-08-20 RX ORDER — MIDAZOLAM HYDROCHLORIDE 1 MG/ML
INJECTION, SOLUTION INTRAMUSCULAR; INTRAVENOUS
Status: DISCONTINUED | OUTPATIENT
Start: 2020-08-20 | End: 2020-08-20

## 2020-08-20 RX ORDER — GLYCOPYRROLATE 0.2 MG/ML
INJECTION INTRAMUSCULAR; INTRAVENOUS
Status: DISCONTINUED | OUTPATIENT
Start: 2020-08-20 | End: 2020-08-20

## 2020-08-20 RX ORDER — MORPHINE SULFATE 2 MG/ML
2 INJECTION, SOLUTION INTRAMUSCULAR; INTRAVENOUS EVERY 10 MIN PRN
Status: DISCONTINUED | OUTPATIENT
Start: 2020-08-20 | End: 2020-08-20 | Stop reason: HOSPADM

## 2020-08-20 RX ORDER — EPINEPHRINE 1 MG/ML
INJECTION, SOLUTION INTRACARDIAC; INTRAMUSCULAR; INTRAVENOUS; SUBCUTANEOUS
Status: DISCONTINUED | OUTPATIENT
Start: 2020-08-20 | End: 2020-08-20 | Stop reason: HOSPADM

## 2020-08-20 RX ORDER — ONDANSETRON 2 MG/ML
4 INJECTION INTRAMUSCULAR; INTRAVENOUS DAILY PRN
Status: DISCONTINUED | OUTPATIENT
Start: 2020-08-20 | End: 2020-08-20 | Stop reason: HOSPADM

## 2020-08-20 RX ORDER — ONDANSETRON 2 MG/ML
4 INJECTION INTRAMUSCULAR; INTRAVENOUS EVERY 12 HOURS PRN
Status: DISCONTINUED | OUTPATIENT
Start: 2020-08-20 | End: 2020-08-20 | Stop reason: HOSPADM

## 2020-08-20 RX ORDER — PROMETHAZINE HYDROCHLORIDE 25 MG/1
25 TABLET ORAL EVERY 6 HOURS PRN
Status: DISCONTINUED | OUTPATIENT
Start: 2020-08-20 | End: 2020-08-20 | Stop reason: HOSPADM

## 2020-08-20 RX ORDER — PREGABALIN 75 MG/1
75 CAPSULE ORAL ONCE
Status: COMPLETED | OUTPATIENT
Start: 2020-08-20 | End: 2020-08-20

## 2020-08-20 RX ORDER — KETOROLAC TROMETHAMINE 30 MG/ML
INJECTION, SOLUTION INTRAMUSCULAR; INTRAVENOUS
Status: DISCONTINUED | OUTPATIENT
Start: 2020-08-20 | End: 2020-08-20 | Stop reason: HOSPADM

## 2020-08-20 RX ORDER — SODIUM CHLORIDE 9 MG/ML
INJECTION, SOLUTION INTRAVENOUS CONTINUOUS
Status: DISCONTINUED | OUTPATIENT
Start: 2020-08-20 | End: 2020-08-20 | Stop reason: HOSPADM

## 2020-08-20 RX ORDER — KETAMINE HYDROCHLORIDE 100 MG/ML
INJECTION, SOLUTION INTRAMUSCULAR; INTRAVENOUS
Status: DISCONTINUED | OUTPATIENT
Start: 2020-08-20 | End: 2020-08-20 | Stop reason: HOSPADM

## 2020-08-20 RX ORDER — ONDANSETRON 2 MG/ML
INJECTION INTRAMUSCULAR; INTRAVENOUS
Status: DISCONTINUED | OUTPATIENT
Start: 2020-08-20 | End: 2020-08-20

## 2020-08-20 RX ORDER — SODIUM CHLORIDE 0.9 % (FLUSH) 0.9 %
10 SYRINGE (ML) INJECTION
Status: DISCONTINUED | OUTPATIENT
Start: 2020-08-20 | End: 2020-08-20 | Stop reason: HOSPADM

## 2020-08-20 RX ORDER — ROCURONIUM BROMIDE 10 MG/ML
INJECTION, SOLUTION INTRAVENOUS
Status: DISCONTINUED | OUTPATIENT
Start: 2020-08-20 | End: 2020-08-20

## 2020-08-20 RX ORDER — EPHEDRINE SULFATE 50 MG/ML
INJECTION, SOLUTION INTRAVENOUS
Status: DISCONTINUED | OUTPATIENT
Start: 2020-08-20 | End: 2020-08-20

## 2020-08-20 RX ORDER — ROPIVACAINE HYDROCHLORIDE 5 MG/ML
INJECTION, SOLUTION EPIDURAL; INFILTRATION; PERINEURAL
Status: DISCONTINUED | OUTPATIENT
Start: 2020-08-20 | End: 2020-08-20 | Stop reason: HOSPADM

## 2020-08-20 RX ADMIN — ONDANSETRON 4 MG: 2 INJECTION, SOLUTION INTRAMUSCULAR; INTRAVENOUS at 12:08

## 2020-08-20 RX ADMIN — KETAMINE HYDROCHLORIDE 20 MG: 100 INJECTION, SOLUTION, CONCENTRATE INTRAMUSCULAR; INTRAVENOUS at 10:08

## 2020-08-20 RX ADMIN — NEOSTIGMINE METHYLSULFATE 5 MG: 1 INJECTION INTRAVENOUS at 12:08

## 2020-08-20 RX ADMIN — FENTANYL CITRATE 100 MCG: 50 INJECTION, SOLUTION INTRAMUSCULAR; INTRAVENOUS at 10:08

## 2020-08-20 RX ADMIN — MORPHINE SULFATE 2 MG: 2 INJECTION, SOLUTION INTRAMUSCULAR; INTRAVENOUS at 01:08

## 2020-08-20 RX ADMIN — PROPOFOL 170 MG: 10 INJECTION, EMULSION INTRAVENOUS at 10:08

## 2020-08-20 RX ADMIN — LIDOCAINE HYDROCHLORIDE 60 MG: 20 INJECTION, SOLUTION INTRAVENOUS at 10:08

## 2020-08-20 RX ADMIN — TRAMADOL HYDROCHLORIDE 100 MG: 50 TABLET, FILM COATED ORAL at 01:08

## 2020-08-20 RX ADMIN — MIDAZOLAM HYDROCHLORIDE 2 MG: 1 INJECTION, SOLUTION INTRAMUSCULAR; INTRAVENOUS at 10:08

## 2020-08-20 RX ADMIN — GLYCOPYRROLATE 0.6 MG: 0.2 INJECTION, SOLUTION INTRAMUSCULAR; INTRAVENOUS at 12:08

## 2020-08-20 RX ADMIN — SODIUM CHLORIDE, SODIUM GLUCONATE, SODIUM ACETATE, POTASSIUM CHLORIDE, MAGNESIUM CHLORIDE, SODIUM PHOSPHATE, DIBASIC, AND POTASSIUM PHOSPHATE: .53; .5; .37; .037; .03; .012; .00082 INJECTION, SOLUTION INTRAVENOUS at 11:08

## 2020-08-20 RX ADMIN — PREGABALIN 75 MG: 75 CAPSULE ORAL at 01:08

## 2020-08-20 RX ADMIN — ROCURONIUM BROMIDE 10 MG: 10 INJECTION, SOLUTION INTRAVENOUS at 11:08

## 2020-08-20 RX ADMIN — ROCURONIUM BROMIDE 40 MG: 10 INJECTION, SOLUTION INTRAVENOUS at 10:08

## 2020-08-20 RX ADMIN — CLINDAMYCIN PHOSPHATE 900 MG: 18 INJECTION, SOLUTION INTRAVENOUS at 10:08

## 2020-08-20 RX ADMIN — KETAMINE HYDROCHLORIDE 10 MG: 100 INJECTION, SOLUTION, CONCENTRATE INTRAMUSCULAR; INTRAVENOUS at 11:08

## 2020-08-20 RX ADMIN — FENTANYL CITRATE 25 MCG: 50 INJECTION INTRAMUSCULAR; INTRAVENOUS at 02:08

## 2020-08-20 RX ADMIN — OXYCODONE 10 MG: 5 TABLET ORAL at 01:08

## 2020-08-20 RX ADMIN — DEXMEDETOMIDINE HYDROCHLORIDE 20 MCG: 100 INJECTION, SOLUTION, CONCENTRATE INTRAVENOUS at 10:08

## 2020-08-20 RX ADMIN — SODIUM CHLORIDE: 0.9 INJECTION, SOLUTION INTRAVENOUS at 09:08

## 2020-08-20 RX ADMIN — EPHEDRINE SULFATE 10 MG: 50 INJECTION INTRAVENOUS at 11:08

## 2020-08-20 RX ADMIN — DEXAMETHASONE SODIUM PHOSPHATE 8 MG: 4 INJECTION, SOLUTION INTRAMUSCULAR; INTRAVENOUS at 12:08

## 2020-08-20 NOTE — PROGRESS NOTES
Patient is AAO and VSS.  Tolerating PO and states pain is tolerable.  Dressing CDI.  Patient states they are ready for d/c.  IV removed.  Catheter tip intact.  Caregiver at bedside.  Discharge instructions reviewed and copy given to the patient and caregiver.  Questions answered.  Both verbalized understanding. Medication delivered to bedside.  Patient arrived with polar ice and sling shot in place to recovery.no DME needed.  Patient wheeled to car by RN/PCT.

## 2020-08-20 NOTE — DISCHARGE SUMMARY
[facility]  Brief Operative Note    Surgery Date: 8/20/2020     Surgeon(s) and Role:     * Kayleen Sherman MD - Primary    Assisting Surgeon: None    Pre-op Diagnosis:  Traumatic tear of right rotator cuff, unspecified tear extent, initial encounter [S46.011A]  Biceps tendinitis of right upper extremity [M75.21]  Superior glenoid labrum lesion of right shoulder, initial encounter [S43.431A]  Arthritis of right acromioclavicular joint [M19.011]  Closed nondisplaced fracture of greater tuberosity of right humerus, initial encounter [S42.254A]    Post-op Diagnosis:  Post-Op Diagnosis Codes:     * Traumatic tear of right rotator cuff, unspecified tear extent, initial encounter [S46.011A]     * Biceps tendinitis of right upper extremity [M75.21]     * Superior glenoid labrum lesion of right shoulder, initial encounter [S43.431A]     * Arthritis of right acromioclavicular joint [M19.011]     * Closed nondisplaced fracture of greater tuberosity of right humerus, initial encounter [S42.254A]    Procedure(s) (LRB):  REPAIR, ROTATOR CUFF, ARTHROSCOPIC (Right)  FIXATION, TENDON, Biceps Tenodesis (Right)  DEBRIDEMENT, SHOULDER, ARTHROSCOPIC (Right)  ARTHROSCOPY, SHOULDER, WITH DISTAL CLAVICLE EXCISION (Right)  ORIF, FRACTURE, HUMERUS (Right)  MICROFRACTURE (Right)    Anesthesia: General    Description of the findings of the procedure(s): rtc repair, biceps tenodesis, sad, dce    Estimated Blood Loss: * No values recorded between 8/20/2020 11:11 AM and 8/20/2020 12:33 PM *         Specimens:   Specimen (12h ago, onward)    None            Discharge Note    OUTCOME: Patient tolerated treatment/procedure well without complication and is now ready for discharge.    DISPOSITION: Home or Self Care    FINAL DIAGNOSIS:  <principal problem not specified>    FOLLOWUP: In clinic    DISCHARGE INSTRUCTIONS:    Discharge Procedure Orders   Diet general     Call MD for:  temperature >100.4     Call MD for:  persistent nausea  and vomiting     Call MD for:  severe uncontrolled pain     Call MD for:  difficulty breathing, headache or visual disturbances     Call MD for:  redness, tenderness, or signs of infection (pain, swelling, redness, odor or green/yellow discharge around incision site)     Call MD for:  hives     Call MD for:  persistent dizziness or light-headedness     No driving, operating heavy equipment or signing legal documents while taking pain medication     Remove dressing in 72 hours     Shower on day dressing removed (No bath)

## 2020-08-20 NOTE — DISCHARGE INSTRUCTIONS

## 2020-08-20 NOTE — H&P
Marcos Barfield  is here for a completion of his perioperative paperwork. he  Is scheduled to undergo     right              a. Shoulder arthroscopic rotator cuff repair              b. Shoulder arthroscopic SAD              c. Shoulder arthroscopic extensive debridement              d. Shoulder arthroscopic biceps tenodesis              e. Shoulder arthroscopic possible microfracture              f.  Shoulder arthroscopic DCE              g. Shoulder open reduction internal fixation greater tuberosity on 8/20/20.      He is a healthy individual but does need clearance for this procedure which he has received from Mary Bonilla, his PCP.     PAST MEDICAL HISTORY:   Past Medical History:   Diagnosis Date    Anemia     kid      PAST SURGICAL HISTORY: History reviewed. No pertinent surgical history.  FAMILY HISTORY: History reviewed. No pertinent family history.  SOCIAL HISTORY:   Social History     Socioeconomic History    Marital status:      Spouse name: Not on file    Number of children: Not on file    Years of education: Not on file    Highest education level: Not on file   Occupational History    Not on file   Social Needs    Financial resource strain: Not on file    Food insecurity     Worry: Not on file     Inability: Not on file    Transportation needs     Medical: Not on file     Non-medical: Not on file   Tobacco Use    Smoking status: Never Smoker    Smokeless tobacco: Never Used   Substance and Sexual Activity    Alcohol use: No    Drug use: No    Sexual activity: Yes   Lifestyle    Physical activity     Days per week: Not on file     Minutes per session: Not on file    Stress: Not on file   Relationships    Social connections     Talks on phone: Not on file     Gets together: Not on file     Attends Nondenominational service: Not on file     Active member of club or organization: Not on file     Attends meetings of clubs or organizations: Not on file     Relationship status: Not on file    Other Topics Concern    Not on file   Social History Narrative    Not on file       MEDICATIONS:   Current Outpatient Medications:     aspirin (ECOTRIN) 81 MG EC tablet, Take 81 mg by mouth once daily.  , Disp: , Rfl:     aspirin 325 MG tablet, Take 1 tablet at lunch daily starting after surgery for 6 weeks to prevent DVT., Disp: 42 tablet, Rfl: 0    celecoxib (CELEBREX) 200 MG capsule, Take 1 capsule (200 mg total) by mouth 2 (two) times daily with meals. (breakfast and dinner), Disp: 60 capsule, Rfl: 0    doxycycline (VIBRAMYCIN) 100 MG Cap, Take 100 mg by mouth every 12 (twelve) hours., Disp: , Rfl:     fluticasone (FLONASE) 50 mcg/actuation nasal spray, , Disp: , Rfl: 0    gabapentin (NEURONTIN) 300 MG capsule, Take 300 mg by mouth 3 (three) times daily., Disp: , Rfl:     insulin glargine (LANTUS) 100 unit/mL injection, Inject 32 Units into the skin every evening.  , Disp: , Rfl:     lisinopril (PRINIVIL,ZESTRIL) 20 MG tablet, Take 20 mg by mouth once daily., Disp: , Rfl:     meclizine (ANTIVERT) 25 mg tablet, , Disp: , Rfl: 3    metformin (GLUCOPHAGE) 1000 MG tablet, Take 1,000 mg by mouth 2 (two) times daily with meals.  , Disp: , Rfl:     pravastatin (PRAVACHOL) 80 MG tablet, Take 80 mg by mouth once daily., Disp: , Rfl:     promethazine (PHENERGAN) 25 MG tablet, Take 1 tablet (25 mg total) by mouth every 4 (four) hours as needed for Nausea., Disp: 30 tablet, Rfl: 0    simvastatin (ZOCOR) 80 MG tablet, Take 80 mg by mouth every evening.  , Disp: , Rfl:     ondansetron (ZOFRAN) 4 MG tablet, Take 1 tablet (4 mg total) by mouth every 8 (eight) hours as needed for Nausea., Disp: 12 tablet, Rfl: 0    oxyCODONE-acetaminophen (PERCOCET)  mg per tablet, Take 1 tablet by mouth every 4-6 hours as needed for pain. Take stool softener with this medication., Disp: 21 tablet, Rfl: 0    traMADoL (ULTRAM) 50 mg tablet, Take 1-2 tablets ( mg total) by mouth every 6 (six) hours as needed., Disp:  "21 tablet, Rfl: 0  ALLERGIES:   Review of patient's allergies indicates:   Allergen Reactions    Meloxicam Other (See Comments)     Drove blood pressure james high per pt       VITAL SIGNS: BP (!) 159/83   Pulse 68   Ht 5' 10" (1.778 m)   Wt 76.7 kg (169 lb)   BMI 24.25 kg/m²      Risks, indications and benefits of the surgical procedure were discussed with the patient. All questions with regard to surgery, rehab, expected return to functional activities, activities of daily living and recreational endeavors were answered to his satisfaction.    It was explained to the patient that there may be an increase in surgical risks if the patient has certain co-morbidities such as but not limited to: Obesity, Cardiovascular issues (CHF, CAD, Arrhythmias), chronic pulmonary issues, previous or current neurovascular/neurological issues, previous strokes, diabetes mellitus, previous wound healing issues, previous wound or skin infections, PVD, clotting disorders, if the patient uses chronic steroids, if the patient takes or has immune compromising medications or diseases, or has previously or currently used tobacco products.     The patient verbalized that he/she does not have any additional clotting, bleeding, or blood disorders, other than what is list in her chart on today's review.     Then a brief history and physical exam were performed.    Review of Systems   Constitution: Negative. Negative for chills, fever and night sweats.   HENT: Negative for congestion and headaches.    Eyes: Negative for blurred vision, left vision loss and right vision loss.   Cardiovascular: Negative for chest pain and syncope.   Respiratory: Negative for cough and shortness of breath.    Endocrine: Negative for polydipsia, polyphagia and polyuria.   Hematologic/Lymphatic: Negative for bleeding problem. Does not bruise/bleed easily.   Skin: Negative for dry skin, itching and rash.   Musculoskeletal: Negative for falls and muscle weakness. "   Gastrointestinal: Negative for abdominal pain and bowel incontinence.   Genitourinary: Negative for bladder incontinence and nocturia.   Neurological: Negative for disturbances in coordination, loss of balance and seizures.   Psychiatric/Behavioral: Negative for depression. The patient does not have insomnia.    Allergic/Immunologic: Negative for hives and persistent infections.     PHYSICAL EXAM:  GEN: A&Ox3, WD WN NAD  HEENT: WNL  CHEST: CTAB, no W/R/R  HEART: RRR, no M/R/G  ABD: Soft, NT ND, BS x4 QUADS  MS; See Epic  NEURO: CN II-XII intact       The surgical consent was then reviewed with the patient, who agreed with all the contents of the consent form and it was signed. he was then given the surgery packet to bring with him to surgery center for the anesthesia portion of his perioperative paperwork (if needed)   For all physicians except for Dr. Nova, we will email and possibly fax the consent forms and booking sheets to [facility].    The patient was given the opportunity to ask questions about the surgical plan and consent form, and once no other questions were asked, I proceeded with the pre-op appointment.    PHYSICAL THERAPY:  He was also instructed regarding physical therapy and will begin on  Likely 4 weeks post-op. He was given a copy of the original prescription to schedule. Another copy of this prescription was also faxed to [facility].    POST OP CARE:instructions were reviewed including care of the wound and dressing after surgery and when he can shower.     CRUTCHES OR WALKER: It was explained to the patient that if they are having a lower extremity surgery that they will require either a walker or crutches to ambulate safely with after surgery. It was explained that a cane or other assistive devices are not sufficient to safely ambulate with after surgery. I explained to the patient that I will place an order for them to receive either crutches or a walker after  surgery to go home with. It was explained that if they have crutches or a walker at home already, that they are REQUIRED to bring them to the hospital on the day of surgery. It was explained that if they do not have them at the hospital on the day of surgery that they WILL be provided a new pair or crutches or a walker to go home with to ensure ambulation will be safe if the patient needs to stop somewhere on the way home.      PAIN MANAGEMENT: Marcos Barfield was also given a pain management regime, which includes the TENS unit given to him by jay along with the education required for its use. He was also instructed regarding the Polar ice unit that will be in place after surgery and his postoperative pain medications.     PAIN MEDICATION:  Percocet 10/325mg 1 po q 4-6 hours prn pain  Ultram 50 mg Take 1-2 p.o. q.6 hours p.r.n. breakthrough pain,   Zofran 4mg. 1 tablet po q8h prn nausea    DVT prophylaxis was discussed with the patient today including risk factors for developing DVTs and history of DVTs. The patient was asked if any specific recommendations were given from the doctor/s that did pre-operative surgical clearance. The patient was then given an education sheet about DVTs and PE with warning signs and symptoms of both and steps to take if they suspect either of these.    This along with the Modified Caprini risk assessment model for VTE in general surgical patients was used to determine the patient's DVT risk.     From: Iman SOLORIO, Santiago DA, Evelyn WADE, et al. Prevention of VTE in nonorthopedic surgical patients: antithrombotic therapy and prevention of thrombosis, 9th ed: American College [employer] evidence-based clinical practical guidelines. Chest 2012; 141:e227S. Copyright © 2012. Reproduced with permission from the American College of Chest Physicians.    The below listed DVT prophylaxis regimen along with bilateral ILIA compression stockings will be used post-op. Length of treatment has been  determined to be 10-42 days post-op by the above noted Caprini assessment model.     The patient was instructed to buy and take:  Aspirin 81mg QD x 4 weeks for DVT prophylaxis starting on the morning after surgery.  Patient will also use bilateral TEDs on lower extremities, SCDs during surgery, and early ambulation post-op. If the patient was previously taking 81mg baby aspirin, they were told to not take it starting 5 days prior to surgery and to restart the 81mg aspirin after surgery.       Patient was also told to buy over the counter Prilosec medication if needed and take it once daily for GI protection as long as they are taking NSAIDs or Aspirin.    Patient denies history of seizures.     The patient was told that narcotic pain medications may make them drowsy and instructions were given to not sign legal documents, drive or operate heavy machinery, cars, or equipment while under the influence of narcotic medications. The patient was told and understands that narcotic pain medications should only be used as needed to control pain and that other options of pain control include TENs unit and ice packs/unit.     As there were no other questions to be asked, he was given my business card along with Kayleen Sherman MD business card if he has any questions or concerns prior to surgery or in the postop period.     As per the guidelines from [place], this patient's condition is considered time sensitive or an urgent issue. The visit could not be done via telemedicine. The patient meets the current criteria to be evaluate and treated in person at this time. Treatment performed during this visit was medically necessary is to avoid further harm to the patient's underlying condition.

## 2020-08-20 NOTE — H&P (VIEW-ONLY)
Marcos Barfield  is here for a completion of his perioperative paperwork. he  Is scheduled to undergo     right              a. Shoulder arthroscopic rotator cuff repair              b. Shoulder arthroscopic SAD              c. Shoulder arthroscopic extensive debridement              d. Shoulder arthroscopic biceps tenodesis              e. Shoulder arthroscopic possible microfracture              f.  Shoulder arthroscopic DCE              g. Shoulder open reduction internal fixation greater tuberosity on 8/20/20.      He is a healthy individual but does need clearance for this procedure which he has received from Mary Bonilla, his PCP.     PAST MEDICAL HISTORY:   Past Medical History:   Diagnosis Date    Anemia     kid      PAST SURGICAL HISTORY: History reviewed. No pertinent surgical history.  FAMILY HISTORY: History reviewed. No pertinent family history.  SOCIAL HISTORY:   Social History     Socioeconomic History    Marital status:      Spouse name: Not on file    Number of children: Not on file    Years of education: Not on file    Highest education level: Not on file   Occupational History    Not on file   Social Needs    Financial resource strain: Not on file    Food insecurity     Worry: Not on file     Inability: Not on file    Transportation needs     Medical: Not on file     Non-medical: Not on file   Tobacco Use    Smoking status: Never Smoker    Smokeless tobacco: Never Used   Substance and Sexual Activity    Alcohol use: No    Drug use: No    Sexual activity: Yes   Lifestyle    Physical activity     Days per week: Not on file     Minutes per session: Not on file    Stress: Not on file   Relationships    Social connections     Talks on phone: Not on file     Gets together: Not on file     Attends Bahai service: Not on file     Active member of club or organization: Not on file     Attends meetings of clubs or organizations: Not on file     Relationship status: Not on file    Other Topics Concern    Not on file   Social History Narrative    Not on file       MEDICATIONS:   Current Outpatient Medications:     aspirin (ECOTRIN) 81 MG EC tablet, Take 81 mg by mouth once daily.  , Disp: , Rfl:     aspirin 325 MG tablet, Take 1 tablet at lunch daily starting after surgery for 6 weeks to prevent DVT., Disp: 42 tablet, Rfl: 0    celecoxib (CELEBREX) 200 MG capsule, Take 1 capsule (200 mg total) by mouth 2 (two) times daily with meals. (breakfast and dinner), Disp: 60 capsule, Rfl: 0    doxycycline (VIBRAMYCIN) 100 MG Cap, Take 100 mg by mouth every 12 (twelve) hours., Disp: , Rfl:     fluticasone (FLONASE) 50 mcg/actuation nasal spray, , Disp: , Rfl: 0    gabapentin (NEURONTIN) 300 MG capsule, Take 300 mg by mouth 3 (three) times daily., Disp: , Rfl:     insulin glargine (LANTUS) 100 unit/mL injection, Inject 32 Units into the skin every evening.  , Disp: , Rfl:     lisinopril (PRINIVIL,ZESTRIL) 20 MG tablet, Take 20 mg by mouth once daily., Disp: , Rfl:     meclizine (ANTIVERT) 25 mg tablet, , Disp: , Rfl: 3    metformin (GLUCOPHAGE) 1000 MG tablet, Take 1,000 mg by mouth 2 (two) times daily with meals.  , Disp: , Rfl:     pravastatin (PRAVACHOL) 80 MG tablet, Take 80 mg by mouth once daily., Disp: , Rfl:     promethazine (PHENERGAN) 25 MG tablet, Take 1 tablet (25 mg total) by mouth every 4 (four) hours as needed for Nausea., Disp: 30 tablet, Rfl: 0    simvastatin (ZOCOR) 80 MG tablet, Take 80 mg by mouth every evening.  , Disp: , Rfl:     ondansetron (ZOFRAN) 4 MG tablet, Take 1 tablet (4 mg total) by mouth every 8 (eight) hours as needed for Nausea., Disp: 12 tablet, Rfl: 0    oxyCODONE-acetaminophen (PERCOCET)  mg per tablet, Take 1 tablet by mouth every 4-6 hours as needed for pain. Take stool softener with this medication., Disp: 21 tablet, Rfl: 0    traMADoL (ULTRAM) 50 mg tablet, Take 1-2 tablets ( mg total) by mouth every 6 (six) hours as needed., Disp:  "21 tablet, Rfl: 0  ALLERGIES:   Review of patient's allergies indicates:   Allergen Reactions    Meloxicam Other (See Comments)     Drove blood pressure james high per pt       VITAL SIGNS: BP (!) 159/83   Pulse 68   Ht 5' 10" (1.778 m)   Wt 76.7 kg (169 lb)   BMI 24.25 kg/m²      Risks, indications and benefits of the surgical procedure were discussed with the patient. All questions with regard to surgery, rehab, expected return to functional activities, activities of daily living and recreational endeavors were answered to his satisfaction.    It was explained to the patient that there may be an increase in surgical risks if the patient has certain co-morbidities such as but not limited to: Obesity, Cardiovascular issues (CHF, CAD, Arrhythmias), chronic pulmonary issues, previous or current neurovascular/neurological issues, previous strokes, diabetes mellitus, previous wound healing issues, previous wound or skin infections, PVD, clotting disorders, if the patient uses chronic steroids, if the patient takes or has immune compromising medications or diseases, or has previously or currently used tobacco products.     The patient verbalized that he/she does not have any additional clotting, bleeding, or blood disorders, other than what is list in her chart on today's review.     Then a brief history and physical exam were performed.    Review of Systems   Constitution: Negative. Negative for chills, fever and night sweats.   HENT: Negative for congestion and headaches.    Eyes: Negative for blurred vision, left vision loss and right vision loss.   Cardiovascular: Negative for chest pain and syncope.   Respiratory: Negative for cough and shortness of breath.    Endocrine: Negative for polydipsia, polyphagia and polyuria.   Hematologic/Lymphatic: Negative for bleeding problem. Does not bruise/bleed easily.   Skin: Negative for dry skin, itching and rash.   Musculoskeletal: Negative for falls and muscle weakness. "   Gastrointestinal: Negative for abdominal pain and bowel incontinence.   Genitourinary: Negative for bladder incontinence and nocturia.   Neurological: Negative for disturbances in coordination, loss of balance and seizures.   Psychiatric/Behavioral: Negative for depression. The patient does not have insomnia.    Allergic/Immunologic: Negative for hives and persistent infections.     PHYSICAL EXAM:  GEN: A&Ox3, WD WN NAD  HEENT: WNL  CHEST: CTAB, no W/R/R  HEART: RRR, no M/R/G  ABD: Soft, NT ND, BS x4 QUADS  MS; See Epic  NEURO: CN II-XII intact       The surgical consent was then reviewed with the patient, who agreed with all the contents of the consent form and it was signed. he was then given the surgery packet to bring with him to surgery center for the anesthesia portion of his perioperative paperwork (if needed)   For all physicians except for Dr. Nova, we will email and possibly fax the consent forms and booking sheets to [facility].    The patient was given the opportunity to ask questions about the surgical plan and consent form, and once no other questions were asked, I proceeded with the pre-op appointment.    PHYSICAL THERAPY:  He was also instructed regarding physical therapy and will begin on  Likely 4 weeks post-op. He was given a copy of the original prescription to schedule. Another copy of this prescription was also faxed to [facility].    POST OP CARE:instructions were reviewed including care of the wound and dressing after surgery and when he can shower.     CRUTCHES OR WALKER: It was explained to the patient that if they are having a lower extremity surgery that they will require either a walker or crutches to ambulate safely with after surgery. It was explained that a cane or other assistive devices are not sufficient to safely ambulate with after surgery. I explained to the patient that I will place an order for them to receive either crutches or a walker after  surgery to go home with. It was explained that if they have crutches or a walker at home already, that they are REQUIRED to bring them to the hospital on the day of surgery. It was explained that if they do not have them at the hospital on the day of surgery that they WILL be provided a new pair or crutches or a walker to go home with to ensure ambulation will be safe if the patient needs to stop somewhere on the way home.      PAIN MANAGEMENT: Marcos Barfield was also given a pain management regime, which includes the TENS unit given to him by jay along with the education required for its use. He was also instructed regarding the Polar ice unit that will be in place after surgery and his postoperative pain medications.     PAIN MEDICATION:  Percocet 10/325mg 1 po q 4-6 hours prn pain  Ultram 50 mg Take 1-2 p.o. q.6 hours p.r.n. breakthrough pain,   Zofran 4mg. 1 tablet po q8h prn nausea    DVT prophylaxis was discussed with the patient today including risk factors for developing DVTs and history of DVTs. The patient was asked if any specific recommendations were given from the doctor/s that did pre-operative surgical clearance. The patient was then given an education sheet about DVTs and PE with warning signs and symptoms of both and steps to take if they suspect either of these.    This along with the Modified Caprini risk assessment model for VTE in general surgical patients was used to determine the patient's DVT risk.     From: Iman SOLORIO, Santiago DA, Evelyn WADE, et al. Prevention of VTE in nonorthopedic surgical patients: antithrombotic therapy and prevention of thrombosis, 9th ed: American College of [employer]icians evidence-based clinical practical guidelines. Chest 2012; 141:e227S. Copyright © 2012. Reproduced with permission from the American College of Chest Physicians.    The below listed DVT prophylaxis regimen along with bilateral ILIA compression stockings will be used post-op. Length of treatment has been  determined to be 10-42 days post-op by the above noted Caprini assessment model.     The patient was instructed to buy and take:  Aspirin 81mg QD x 4 weeks for DVT prophylaxis starting on the morning after surgery.  Patient will also use bilateral TEDs on lower extremities, SCDs during surgery, and early ambulation post-op. If the patient was previously taking 81mg baby aspirin, they were told to not take it starting 5 days prior to surgery and to restart the 81mg aspirin after surgery.       Patient was also told to buy over the counter Prilosec medication if needed and take it once daily for GI protection as long as they are taking NSAIDs or Aspirin.    Patient denies history of seizures.     The patient was told that narcotic pain medications may make them drowsy and instructions were given to not sign legal documents, drive or operate heavy machinery, cars, or equipment while under the influence of narcotic medications. The patient was told and understands that narcotic pain medications should only be used as needed to control pain and that other options of pain control include TENs unit and ice packs/unit.     As there were no other questions to be asked, he was given my business card along with Kayleen Sherman MD business card if he has any questions or concerns prior to surgery or in the postop period.     As per the guidelines from [facility], this patient's condition is considered time sensitive or an urgent issue. The visit could not be done via telemedicine. The patient meets the current criteria to be evaluate and treated in person at this time. Treatment performed during this visit was medically necessary is to avoid further harm to the patient's underlying condition.

## 2020-08-20 NOTE — TRANSFER OF CARE
"Anesthesia Transfer of Care Note    Patient: Marcos Barfield    Procedure(s) Performed: Procedure(s) (LRB):  REPAIR, ROTATOR CUFF, ARTHROSCOPIC (Right)  FIXATION, TENDON, Biceps Tenodesis (Right)  DEBRIDEMENT, SHOULDER, ARTHROSCOPIC (Right)  ARTHROSCOPY, SHOULDER, WITH DISTAL CLAVICLE EXCISION (Right)  ORIF, FRACTURE, HUMERUS (Right)  MICROFRACTURE (Right)    Patient location: PACU    Anesthesia Type: general    Transport from OR: Transported from OR on 6-10 L/min O2 by face mask with adequate spontaneous ventilation    Post pain: adequate analgesia    Post assessment: no apparent anesthetic complications and tolerated procedure well    Post vital signs: stable    Level of consciousness: awake, alert and oriented    Nausea/Vomiting: no nausea/vomiting    Complications: none    Transfer of care protocol was followed      Last vitals:   Visit Vitals  BP (!) 147/97 (BP Location: Left arm, Patient Position: Lying)   Pulse 68   Temp 36.9 °C (98.4 °F) (Oral)   Resp 18   Ht 5' 10" (1.778 m)   Wt 76.7 kg (169 lb)   SpO2 98%   BMI 24.25 kg/m²     "

## 2020-08-20 NOTE — INTERVAL H&P NOTE
The patient has been examined and the H&P has been reviewed:    I concur with the findings and no changes have occurred since H&P was written.    Anesthesia/Surgery risks, benefits and alternative options discussed and understood by patient/family.          Active Hospital Problems    Diagnosis  POA    Traumatic tear of right rotator cuff [S46.011A]  Yes      Resolved Hospital Problems   No resolved problems to display.

## 2020-08-20 NOTE — ANESTHESIA PROCEDURE NOTES
Intubation  Performed by: Eris Flowers CRNA  Authorized by: Tiesha Silva MD     Intubation:     Induction:  Intravenous    Intubated:  Postinduction    Mask Ventilation:  Easy mask    Attempts:  1    Attempted By:  CRNA    Method of Intubation:  Direct    Blade:  [place] 2    Laryngeal View Grade: Grade I - full view of chords      Difficult Airway Encountered?: No      Complications:  None    Airway Device:  Oral endotracheal tube    Airway Device Size:  7.5    Style/Cuff Inflation:  Cuffed    Inflation Amount (mL):  6    Tube secured:  22    Secured at:  The lips    Placement Verified By:  Capnometry    Complicating Factors:  None    Findings Post-Intubation:  BS equal bilateral

## 2020-08-20 NOTE — OP NOTE
DATE OF PROCEDURE: 08/20/2020    SURGEON: Kayleen Sherman M.D.    ASSISTANT: JAIDEN Durham M.D. PGY6  ASSISTANT: SMA Maci      PREOPERATIVE DIAGNOSES:   right  1. Shoulder rotator cuff tear   2. Shoulder biceps tendonitis  3. Shoulder synovitis  4. Shoulder SLAP  5. Shoulder impingement, bursitis  6. Shoulder AC arthritis  7. Shoulder greater tuberosity insufficiency fracture    POSTOPERATIVE DIAGNOSES:   right  1. Shoulder rotator cuff tear   2. Shoulder biceps tendonitis  3. Shoulder synovitis  4. Shoulder SLAP  5. Shoulder impingement, bursitis  6. Shoulder AC arthritis  7. Shoulder adhesions  8. Shoulder greater tuberosity insufficiency fracture    OPERATION:   right  1. Shoulder arthroscopic rotator cuff repair 15 x 10 mm, medium, double row (CPT 21690)   2. Shoulder arthroscopic biceps tenodesis (CPT 95490)  3. Shoulder arthroscopic subacromial decompression, bursectomy   4. Shoulder arthroscopic extensive debridement (anterior, posterior glenohumeral joint, subacromial space) (CPT 45473)  5. Shoulder arthroscopic microfracture (Crimson duvet technique) (CPT 45685)  6. Shoulder arthroscopic lysis of adhesions (CPT 58669)  7. Shoulder arthroscopic distal clavicle excision (CPT 83524)  8. Shoulder ORIF greater tuberosity (CPT 75952)    ANESTHESIA:  General with intra-op suprascapular nerve block with local    ESTIMATED BLOOD LOSS:  Minimal.    TOURNIQUET TIME:  None.    DRAINS:  None.    COMPLICATIONS:  None.  The patient was moved to the recovery room in stable condition with compartments soft and cap refill less than a second in all digits.    INDICATIONS/MEDICAL NECESSITY: The patient is a 70 y.o. year-old male who has history and physical examination findings consistent with the above.  Nonoperative versus operative options were discussed.  The risks and benefits were discussed with the patient.  The patient acknowledged understanding and wished to proceed with operative intervention.  Informed consent was  obtained prior to the procedure.  Reasonable expectations and potential complications were discussed and acknowledged, including but not limited to infection, bleeding, blood clots, (DVT and/or PE), nerve injury, tear, instability, continued pain and stiffness.  They agreed and understood and wished to proceed.    EXAMINATION UNDER ANESTHESIA of the right SHOULDER: Forward elevation 175 degrees, External rotation at 0 60 degrees, External rotation at 90 90 degrees, Internal rotation at 90 60 degrees.  Translation testing: anterior grade 1, posterior grade 1, sulcus sign grade 1 corrects to 0 on external rotation.    EXAMINATION UNDER ANESTHESIA of the left SHOULDER: Forward elevation 175 degrees, External rotation at 0 60 degrees, External rotation at 90 90 degrees, Internal rotation at 90 60 degrees.  Translation testing: anterior grade 1, posterior grade 1, sulcus sign grade 1 corrects to 0 on external rotation.    PROCEDURE IN DETAIL:  After the correct operative site was marked by the operating surgeon. The patient was then taken to the operating room and placed supine on the operating room table, where the patient  underwent general anesthesia by the anesthesia team.  The patient was then rolled into the lateral decubitus position with the operative side up.  A well-padded axillary roll, beanbag and pillows were placed.  All pressure points were carefully padded and checked.  The upper extremities and both lower extremities were placed in comfortable positions and were also well-padded.  The operative upper extremity was then prepped and draped in the usual sterile fashion.    Suprascapular nerve block was performed in the standard fashion with 5cc local anesthetic.    The Spider arm positioner was implemented with balanced suspension and appropriate landmarks were noted on the skin.  A posterior followed by apul-superior portals were created and systematic examination of the joint revealed the following:       There was no evidence of any significant chondral lesions to the glenoid or humeral head.     Tenosynovitis and SLAP type II was noted to the biceps tendon on ramp sign.    There was some synovitis in the labrum that was debrided as needed.  Biceps release with auto-tenodesis was performed using thermal device.  Part of superior labrum was included to allow the biceps tendon to auto-tenodese.  Attention was then turned to the rotator cuff.  The rotator cuff undersurface was then visualized and debrided as needed using a shaver.      Attention was then turned to the subacromial space where a significant hypertrophic bursa was encountered.  The bursa itself was thickened and hypertrophic.  A lateral portal was created to assist with the bursectomy.  Subacromial decompression was completed using three-portal technique in the standard fashion with a 4.5 mm hellen without difficulty.  The anterior osteophyte was flattened.  Confirmation of adequate resection was confirmed while viewing from the lateral portal.      Next, attention was then turned to the distal clavicle excision.  A thermal device was used to clear the soft tissue off the length of the AC joint approximately 1 cm medial to the end of the distal end of the clavicle.  The anterior portal which was established earlier was used to perform the AC resection at the level of the AC joint.  The adequacy of the resection was confirmed while viewing from the anterior portal.  Care was taken to resect enough postero-superiorly.  Approximately 9 mm was resected.     Greater tuberosity bone was soft to anchor punch device, so was elected to proceed with Tactoset bone grafting. Separate incision was made and Tactoset bone graft substitute (hydroxyapatite) was inserted in the standard fashion into site of bone marrow edema/ insufficiency fracture at greater tuberosity with MRI correlation, subsequent anchor fixation was good. This was done to improve strength of  fixation.    The surface of the rotator cuff was then gently debrided and mobilized.  We did this using a shaver while viewing through the posterior portal and the shaver used through the lateral portal.  We were then able to mobilize the cuff tear which was located at the supraspinatus extending to the infraspinatus.  The cuff was able to be mobilized adequately laterally.  The cuff tear dimensions were: 15 mm AP and 10 mm medial to lateral.  The undersurface edge of the cuff was debrided as needed using the shaver.  A 7 mm cannula was placed in the lateral and paul-superior portals.  The bony bed of the greater tuberosity was prepared with the hellen.  This left a nice surface for the articular surface of the cuff to be repaired to and heal to.  Adequate debridement was performed, moving the arm as needed with internal/external rotation.     Shoulder arthroscopic lysis of adhesions (CPT 55097):  With the arthroscope in the subacromial space, an extensive lysis of adhesions needed to be performed with lysis of adhesions in the lateral gutter, posterior gutter, and anterior gutter where there was extensive scarring from the chronic nature of the rotator cuff tear.  The rotator cuff was retracted and there was a medium tear noted.  After the lysis of adhesions, the mobility was restored to the rotator cuff tissue and shoulder.    A 3 mm incision was made to place the 1 anchor through.  This was done in a central location of the cuff tear.  A 5.5mm Arthrex Bio-corkscrew anchor was placed and Scorpion suture-passing device was used to place two horizontal mattress sutures through the cuff starting anteriorly and proceeding posteriorly.  The cuff tear was a crescentic shaped tear.  The sutures were then tied using modified René knots proceeding from anterior to posterior.  All knots had good loop and knot security using modified René knots.  After knots were applied from anterior to posterior, the cuff was  reapproximated down to the greater tuberosity with good compression and knots on the superior side of the cuff.  The shoulder was cycled through full internal/external rotation.  No suture breakage was noted and the cuff was noted to remain nicely reapproximated throughout the entire rotation of the joint.  The scope was then placed in the joint on the articular side of the cuff.  The cuff was reapproximated nicely.     Prior to the lateral cuff fixation, 'crimson duvet' microfracture technique was performed using an awl to the greater tuberosity in the standard fashion using awl punch device. There was confirmation of marrow elements extravasating out of the end of the microfracture holes upon decrease of pump pressure.     Double row cuff fixation was then performed using a 4.75 x 19.1 mm Swivelock bio-composite anchor x 2.  This gave good compression of the rotator cuff tissue lateral to the medial row down onto the greater tuberosity, which was previously repaired with a bur.      Suprascapular nerve block was performed in the standard fashion with 5cc local anesthetic, and 5cc subacromially for local.  Local was placed about portals and incision(s) after irrigation, as described below, was completed. The shoulder and subacromial space were then irrigated and fluid was extravasated using suction. All portals were reapproximated using inverted 4-0 Monocryl suture in the subcutaneous tissue of the portals. Mastisol and Steri-strips were placed with xeroform, 4x4s, abd pad, and Medi-pore tape.  TENS unit pads were placed which were medically necessary for pain relief.  An iceman was secured in the shoulder zavala.  A sling with an abduction pillow was secured.  The patient was then moved to supine, extubated and taken to the recovery room where the patient arrived in stable condition with the compartments of the arm and forearm soft and cap refill less than a second in all digits.     POSTOPERATIVE PLAN: We will  follow the arthroscopic rotator cuff repair guidelines for a medium size rotator cuff tear.  We discussed with the patient's family after surgery.  The patient will remain in a sling for 4 weeks.  PT to start at 6 weeks.    Quality of tissue: good     Quality of the repair: good      Size of tear: 15 x 10 mm

## 2020-08-20 NOTE — PLAN OF CARE
Pre-op complete, all questions answered, pt is stable and ready for next phase of care. Pt's belongings are with his wife .

## 2020-08-20 NOTE — ANESTHESIA PREPROCEDURE EVALUATION
08/20/2020  Marcos Barfield is a 70 y.o., male.    Anesthesia Evaluation    I have reviewed the Patient Summary Reports.    I have reviewed the Nursing Notes.    I have reviewed the Medications.     Review of Systems  Anesthesia Hx:  No problems with previous Anesthesia  Denies Family Hx of Anesthesia complications.    Cardiovascular:   Exercise tolerance: good Hypertension hyperlipidemia    Pulmonary:  Pulmonary Normal    Neurological:  Neurology Normal    Endocrine:   Diabetes      Past Medical History:   Diagnosis Date    Anemia     kid      History reviewed. No pertinent surgical history.  Patient Active Problem List   Diagnosis    Right shoulder pain    Rotator cuff syndrome of right shoulder         Physical Exam  General:  Well nourished    Airway/Jaw/Neck:  Airway Findings: Mouth Opening: Normal General Airway Assessment: Adult  Mallampati: II  TM Distance: Normal, at least 6 cm  Jaw/Neck Findings:  Neck ROM: Normal ROM  Neck Findings:     Eyes/Ears/Nose:  Eyes/Ears/Nose Findings:    Dental:  Dental Findings: In tact   Chest/Lungs:  Chest/Lungs Findings: Normal Respiratory Rate     Heart/Vascular:  Heart Findings: Rate: Normal        Mental Status:  Mental Status Findings:  Cooperative, Alert and Oriented       Wt Readings from Last 3 Encounters:   08/17/20 76.7 kg (169 lb)   08/12/20 76.7 kg (169 lb)   08/03/20 76.7 kg (169 lb)     Temp Readings from Last 3 Encounters:   09/02/17 37 °C (98.6 °F)   07/09/12 36.8 °C (98.3 °F)     BP Readings from Last 3 Encounters:   08/17/20 (!) 159/83   08/12/20 (!) 167/82   08/03/20 (!) 174/88     Pulse Readings from Last 3 Encounters:   08/17/20 68   08/12/20 66   08/03/20 65     No results found for: WBC, HGB, HCT, MCV, PLT      Chemistry    No results found for: NA, K, CL, CO2, BUN, CREATININE, GLU No results found for: CALCIUM, ALKPHOS, AST, ALT, BILITOT,  ESTGFRAFRICA, EGFRNONAA     No results found for this or any previous visit.    Anesthesia Plan  Type of Anesthesia, risks & benefits discussed:  Anesthesia Type:  general  Patient's Preference:   Intra-op Monitoring Plan: standard ASA monitors  Intra-op Monitoring Plan Comments:   Post Op Pain Control Plan: per primary service following discharge from PACU  Post Op Pain Control Plan Comments:   Induction:   IV  Beta Blocker:         Informed Consent: Patient understands risks and agrees with Anesthesia plan.  Questions answered. Anesthesia consent signed with patient.  ASA Score: 2     Day of Surgery Review of History & Physical:    H&P update referred to the surgeon.         Ready For Surgery From Anesthesia Perspective.

## 2020-08-21 NOTE — ANESTHESIA POSTPROCEDURE EVALUATION
Anesthesia Post Evaluation    Patient: Marcos Barfield    Procedure(s) Performed: Procedure(s) (LRB):  REPAIR, ROTATOR CUFF, ARTHROSCOPIC (Right)  FIXATION, TENDON, Biceps Tenodesis (Right)  DEBRIDEMENT, SHOULDER, ARTHROSCOPIC (Right)  ARTHROSCOPY, SHOULDER, WITH DISTAL CLAVICLE EXCISION (Right)  ORIF, FRACTURE, HUMERUS (Right)  MICROFRACTURE (Right)    Final Anesthesia Type: general    Patient location during evaluation: PACU  Patient participation: Yes- Able to Participate  Level of consciousness: awake and alert  Post-procedure vital signs: reviewed and stable  Pain management: adequate  Airway patency: patent    PONV status at discharge: No PONV  Anesthetic complications: no      Cardiovascular status: blood pressure returned to baseline  Respiratory status: unassisted  Hydration status: euvolemic  Follow-up not needed.          Vitals Value Taken Time   /72 08/20/20 1431   Temp 36.4 °C (97.6 °F) 08/20/20 1430   Pulse 60 08/20/20 1445   Resp 16 08/20/20 1445   SpO2 100 % 08/20/20 1445         Event Time   Out of Recovery 14:45:00         Pain/Everett Score: Pain Rating Prior to Med Admin: 6 (8/20/2020  2:30 PM)  Pain Rating Post Med Admin: 5 (8/20/2020  3:00 PM)  Everett Score: 10 (8/20/2020  3:00 PM)

## 2020-09-04 ENCOUNTER — OFFICE VISIT (OUTPATIENT)
Dept: SPORTS MEDICINE | Facility: CLINIC | Age: 70
End: 2020-09-04
Payer: OTHER MISCELLANEOUS

## 2020-09-04 ENCOUNTER — HOSPITAL ENCOUNTER (OUTPATIENT)
Dept: RADIOLOGY | Facility: HOSPITAL | Age: 70
Discharge: HOME | End: 2020-09-04
Attending: PHYSICIAN ASSISTANT
Payer: OTHER MISCELLANEOUS

## 2020-09-04 VITALS
BODY MASS INDEX: 23.6 KG/M2 | SYSTOLIC BLOOD PRESSURE: 148 MMHG | HEIGHT: 70 IN | DIASTOLIC BLOOD PRESSURE: 78 MMHG | HEART RATE: 71 BPM | WEIGHT: 164.88 LBS

## 2020-09-04 DIAGNOSIS — M25.511 RIGHT SHOULDER PAIN, UNSPECIFIED CHRONICITY: ICD-10-CM

## 2020-09-04 DIAGNOSIS — Z98.890 S/P ROTATOR CUFF SURGERY: ICD-10-CM

## 2020-09-04 DIAGNOSIS — M25.511 RIGHT SHOULDER PAIN, UNSPECIFIED CHRONICITY: Primary | ICD-10-CM

## 2020-09-04 PROCEDURE — 99999 PR PBB SHADOW E&M-EST. PATIENT-LVL III: CPT | Mod: PBBFAC,,, | Performed by: PHYSICIAN ASSISTANT

## 2020-09-04 PROCEDURE — 99024 POSTOP FOLLOW-UP VISIT: CPT | Mod: S$GLB,,, | Performed by: PHYSICIAN ASSISTANT

## 2020-09-04 PROCEDURE — 73030 XR SHOULDER COMPLETE 2 OR MORE VIEWS RIGHT: ICD-10-PCS | Mod: 26,RT,, | Performed by: RADIOLOGY

## 2020-09-04 PROCEDURE — 99024 PR POST-OP FOLLOW-UP VISIT: ICD-10-PCS | Mod: S$GLB,,, | Performed by: PHYSICIAN ASSISTANT

## 2020-09-04 PROCEDURE — 73030 X-RAY EXAM OF SHOULDER: CPT | Mod: 26,RT,, | Performed by: RADIOLOGY

## 2020-09-04 PROCEDURE — 99999 PR PBB SHADOW E&M-EST. PATIENT-LVL III: ICD-10-PCS | Mod: PBBFAC,,, | Performed by: PHYSICIAN ASSISTANT

## 2020-09-04 PROCEDURE — 73030 X-RAY EXAM OF SHOULDER: CPT | Mod: TC,RT

## 2020-09-04 NOTE — PROGRESS NOTES
Chief Complaint: 2 week shoulder surgery f/u    HISTORY OF PRESENT ILLNESS:   Pt is here today for first post-operative followup of shoulder arthroscopy.  he is doing well.  We have reviewed patient's findings and discussed plan of care and future treatment options.      Tolerating pain well.   Wearing sling which is in place.    DATE OF PROCEDURE: 08/20/2020     SURGEON: Kayleen Sherman M.D.     OPERATION:   right  1. Shoulder arthroscopic rotator cuff repair 15 x 10 mm, medium, double row (CPT 12889)   2. Shoulder arthroscopic biceps tenodesis (CPT 23333)  3. Shoulder arthroscopic subacromial decompression, bursectomy   4. Shoulder arthroscopic extensive debridement (anterior, posterior glenohumeral joint, subacromial space) (CPT 10012)  5. Shoulder arthroscopic microfracture (Crimson duvet technique) (CPT 98680)  6. Shoulder arthroscopic lysis of adhesions (CPT 52365)  7. Shoulder arthroscopic distal clavicle excision (CPT 03508)  8. Shoulder ORIF greater tuberosity (CPT 65772)                                                                                  PHYSICAL EXAMINATION:     Incision sites healed well  No evidence of any erythema, infection or induration  elbow Range of motion full   Minimal effusion  2+ Radial pulses  No swelling                                                                               ASSESSMENT:                                                                                                                                               1. Status post above, doing well.                                                                                                                               PLAN:                                                                                                                                                     1. PT to start in 2 weeks; wean narcotics  2. Emphasized scapular function.  3. I have discussed return to activity in detail.  4. Patient will  see us back in 4 weeks.                                      5. All questions were answered and the patient should contact us if he  has any questions or concerns in the interim.

## 2020-09-18 ENCOUNTER — CLINICAL SUPPORT (OUTPATIENT)
Dept: REHABILITATION | Facility: HOSPITAL | Age: 70
End: 2020-09-18
Attending: PHYSICIAN ASSISTANT
Payer: OTHER MISCELLANEOUS

## 2020-09-18 ENCOUNTER — TELEPHONE (OUTPATIENT)
Dept: SPORTS MEDICINE | Facility: CLINIC | Age: 70
End: 2020-09-18

## 2020-09-18 DIAGNOSIS — S46.011A TRAUMATIC TEAR OF RIGHT ROTATOR CUFF, UNSPECIFIED TEAR EXTENT, INITIAL ENCOUNTER: ICD-10-CM

## 2020-09-18 DIAGNOSIS — M75.21 BICEPS TENDINITIS OF RIGHT UPPER EXTREMITY: ICD-10-CM

## 2020-09-18 PROCEDURE — 97161 PT EVAL LOW COMPLEX 20 MIN: CPT

## 2020-09-18 PROCEDURE — 97110 THERAPEUTIC EXERCISES: CPT
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
## 2020-09-18 NOTE — TELEPHONE ENCOUNTER
----- Message from Andrew Villalobos sent at 9/18/2020 12:47 PM CDT -----  Contact: self  Pt is asking for a call back in regards to PT     Contact info- [phone]

## 2020-09-24 ENCOUNTER — CLINICAL SUPPORT (OUTPATIENT)
Dept: REHABILITATION | Facility: HOSPITAL | Age: 70
End: 2020-09-24
Payer: OTHER MISCELLANEOUS

## 2020-09-24 DIAGNOSIS — M25.511 CHRONIC RIGHT SHOULDER PAIN: Primary | ICD-10-CM

## 2020-09-24 DIAGNOSIS — M25.611 DECREASED RANGE OF MOTION OF RIGHT SHOULDER: ICD-10-CM

## 2020-09-24 DIAGNOSIS — G89.29 CHRONIC RIGHT SHOULDER PAIN: Primary | ICD-10-CM

## 2020-09-24 PROBLEM — M25.619 DECREASED RANGE OF MOTION (ROM) OF SHOULDER: Status: ACTIVE | Noted: 2020-09-24

## 2020-09-24 PROCEDURE — 97112 NEUROMUSCULAR REEDUCATION: CPT

## 2020-09-24 PROCEDURE — 97110 THERAPEUTIC EXERCISES: CPT

## 2020-09-24 PROCEDURE — 97140 MANUAL THERAPY 1/> REGIONS: CPT

## 2020-09-24 NOTE — PROGRESS NOTES
Physical Therapy Daily Treatment Note     Name: Marcos Barfield  [facility] Number: [id number]    Therapy Diagnosis:   Encounter Diagnoses   Name Primary?    Decreased range of motion of right shoulder     Chronic right shoulder pain Yes     Physician: Nhan Grullon III, *    Visit Date: 9/24/2020  Physician Orders: PT Eval and Treat  Medical Diagnosis from Referral:   S46.011A (ICD-10-CM) - Traumatic tear of right rotator cuff, unspecified tear extent, initial encounter   M75.21 (ICD-10-CM) - Biceps tendinitis of right upper extremity         Evaluation Date: 9/18/2020  Authorization Period Expiration: 11/1/2020  Plan of Care Expiration: 12/31/2020  Visit # / Visits authorized: 1/ 12     Time In: 1400  Time Out: 1440  Total Billable Time: 40 minutes     DOS: 08/20/2020  S/p: 1. Shoulder arthroscopic rotator cuff repair 15 x 10 mm, medium, double row (CPT 91994)   2. Shoulder arthroscopic biceps tenodesis (CPT 22170)  3. Shoulder arthroscopic subacromial decompression, bursectomy   4. Shoulder arthroscopic extensive debridement (anterior, posterior glenohumeral joint, subacromial space) (CPT 84759)  5. Shoulder arthroscopic microfracture (Crimson duvet technique) (CPT 37547)  6. Shoulder arthroscopic lysis of adhesions (CPT 71732)  7. Shoulder arthroscopic distal clavicle excision (CPT 83126)  8. Shoulder ORIF greater tuberosity (CPT 47066)  Post-Op Precautions: We will follow the arthroscopic rotator cuff repair guidelines for a medium size rotator cuff tear.  We discussed with the patient's family after surgery.  The patient will remain in a sling for 4 weeks.  PT to start at 6 weeks.        Precautions: Standard, diabetes       Subjective     Pt reports: he has been feeling OK- compliant with exercises.    He was compliant with home exercise program.  Response to previous treatment: na  Functional change: na    Pain: 0/10  Location: R sup shoulder      Objective     Daily Measurements:   09/23/2020  Shoulder  Passive Range of Motion:   Shoulder Right Left   Flexion   110 180   ER at 0   40 70   ER at 90   NT 90   IR   To belly NT           Daily Treatment       Marcos received therapeutic exercises to develop ROM, flexibility and posture for 30 minutes including:  Table Slides 3x15   Pendulums with open/close fist 10x   LLLD Hold elbow extension x8m supine   Elbow ext/flexion 20x   Wrist flex/ext 2# 3x12  Ea way   Wrist sup/pron 2# 3x12     Marcos received the following manual therapy techniques: Joint mobilizations and Soft tissue Mobilization were applied to the: R shoulder/elbow  for 10 minutes, including:  Skilled PROM into shoulder flexion, abduction, ER per post-operative guidelines to improve range of motion and mobility at the glenohumeral joint  Elbow distraction mob grade III       Marcos participated in neuromuscular re-education activities to improve: Posture and motor control for 08 minutes. The following activities were included:  Iso ER/IR/Flex/Ext 10x10s ea way     Home Exercises and Patient Education Provided     Education provided:   - Cont to wear sling at night, if wathcing TV take off sling and allow elbow to straighten     Written Home Exercises Provided: Patient instructed to cont prior HEP.  Exercises were reviewed and Marcos was able to demonstrate them prior to the end of the session.  Marcos demonstrated good  understanding of the education provided.     See EMR under patient instructions for exercises given.     Assessment     The pt with mild dec in elbow extension and reports of stiffness with stretching most likely due to being in the sling for a prolonged period of time. Overall the pt able to complete exercises well with mild to no pain.     Marcos is progressing well towards his goals.     Pt will continue to benefit from skilled outpatient physical therapy to address the deficits listed in the problem list box on initial evaluation, provide pt/family education and to maximize pt's level of  independence in the home and community environment. Pt prognosis is Good.     Pt's spiritual, cultural and educational needs considered and pt agreeable to plan of care and goals.    Anticipated barriers to physical therapy: None    Goals:  Short Term Goals: 6 weeks  1. Pt will be compliant with HEP 50% of prescribed amount.   2. The pt to demo improvement in R shoulder PROM to by 80% of the L  3.  The pt to demo tolerance to being out of the sling for 24 hours with pain <2/10     Long Term Goals: 24 weeks   1. Pt will be compliant with % of prescribed amount.   2. The pt to improvement in AROM of R shoulder within 80% of L shoulder to improve tolerance to OH movement   3. The pt to  Demo at least 4+/5 of RTC muscle testing to demo improvement in tolerance to activity  4. The pt to tolerate lifting 2# overhead to improve tolerance to ADLs and work related activities   5. The pt will report full participation in ADLs and IADLs without restrictions related to R Shoulder    Plan     Follow Dr. Sherman's medium size RTC repair post op protocol    Brigitte Hopkins, PT , DPT, SCS, FAAOMPT

## 2020-10-01 ENCOUNTER — CLINICAL SUPPORT (OUTPATIENT)
Dept: REHABILITATION | Facility: HOSPITAL | Age: 70
End: 2020-10-01
Payer: OTHER MISCELLANEOUS

## 2020-10-01 DIAGNOSIS — M25.611 DECREASED RANGE OF MOTION OF RIGHT SHOULDER: ICD-10-CM

## 2020-10-01 DIAGNOSIS — M25.511 CHRONIC RIGHT SHOULDER PAIN: ICD-10-CM

## 2020-10-01 DIAGNOSIS — G89.29 CHRONIC RIGHT SHOULDER PAIN: ICD-10-CM

## 2020-10-01 PROCEDURE — 97110 THERAPEUTIC EXERCISES: CPT

## 2020-10-01 PROCEDURE — 97140 MANUAL THERAPY 1/> REGIONS: CPT

## 2020-10-01 PROCEDURE — 97112 NEUROMUSCULAR REEDUCATION: CPT

## 2020-10-01 NOTE — PROGRESS NOTES
Physical Therapy Daily Treatment Note     Name: Marcos Barfield  [facility] Number: [id number]    Therapy Diagnosis:   Encounter Diagnoses   Name Primary?    Decreased range of motion of right shoulder     Chronic right shoulder pain      Physician: Nhan Grullon III, *    Visit Date: 10/1/2020  Physician Orders: PT Eval and Treat  Medical Diagnosis from Referral:   S46.011A (ICD-10-CM) - Traumatic tear of right rotator cuff, unspecified tear extent, initial encounter   M75.21 (ICD-10-CM) - Biceps tendinitis of right upper extremity         Evaluation Date: 9/18/2020  Authorization Period Expiration: 11/1/2020  Plan of Care Expiration: 12/31/2020  Visit # / Visits authorized: 3/ 12     Time In: 0900  Time Out: 0950  Total Billable Time: 50 minutes     DOS: 08/20/2020  S/p: 1. Shoulder arthroscopic rotator cuff repair 15 x 10 mm, medium, double row (CPT 75429)   2. Shoulder arthroscopic biceps tenodesis (CPT 93259)  3. Shoulder arthroscopic subacromial decompression, bursectomy   4. Shoulder arthroscopic extensive debridement (anterior, posterior glenohumeral joint, subacromial space) (CPT 38900)  5. Shoulder arthroscopic microfracture (Crimson duvet technique) (CPT 73616)  6. Shoulder arthroscopic lysis of adhesions (CPT 68495)  7. Shoulder arthroscopic distal clavicle excision (CPT 19130)  8. Shoulder ORIF greater tuberosity (CPT 09208)  Post-Op Precautions: We will follow the arthroscopic rotator cuff repair guidelines for a medium size rotator cuff tear.  We discussed with the patient's family after surgery.  The patient will remain in a sling for 4 weeks.  PT to start at 6 weeks.        Precautions: Standard, diabetes       Subjective     Pt reports: his lower neck on the right side has been hurting him intermittently. Notes that his shoulder feels stiff but no pain. Has been compliant with sling wear.     He was compliant with home exercise program.  Response to previous treatment: na  Functional change:  na    Pain: 0/10  Location: R sup shoulder      Objective     Daily Measurements:   10/01/2020  Shoulder Passive Range of Motion:   Shoulder Right Left   Flexion   115 180   ER at 0   NT 70   ER at 90   NT 90   IR   To belly NT       Elbow Extension L: 10 from 0 deg    Daily Treatment     Marcos received therapeutic exercises to develop ROM, flexibility and posture for 30 minutes including:  Table Slides 3x15   Pendulums with open/close fist 10x   LLLD Hold elbow extension x8m supine   Elbow ext/flexion 20x   Wrist flex/ext 2# 3x12  Ea way   Wrist sup/pron 2# 3x12   1st Rib self mob with strap 3x     Marcos received the following manual therapy techniques: Joint mobilizations and Soft tissue Mobilization were applied to the: R shoulder/elbow  for 10 minutes, including:  Skilled PROM into shoulder flexion, abduction, ER per post-operative guidelines to improve range of motion and mobility at the glenohumeral joint  Elbow distraction mob grade III   Grade I-III inf 1st and 2nd rib mob   Suboccipital release       Marcos participated in neuromuscular re-education activities to improve: Posture and motor control for 08 minutes. The following activities were included:  Iso ER/IR/Flex/Ext 10x10s ea way     Home Exercises and Patient Education Provided     Education provided:   - Cont to wear sling at night, if wathcing TV take off sling and allow elbow to straighten     Written Home Exercises Provided: Patient instructed to cont prior HEP.  Exercises were reviewed and Marcos was able to demonstrate them prior to the end of the session.  Marcos demonstrated good  understanding of the education provided.     See EMR under patient instructions for exercises given.     Assessment     The pt with good tolerance to all therex and manual treatment. The pt required cueing to dec scap elevation during table slides with good carryover. The pt advised to cont to wear sling until MD d/c it.     Marcos is progressing well towards his goals.      Pt will continue to benefit from skilled outpatient physical therapy to address the deficits listed in the problem list box on initial evaluation, provide pt/family education and to maximize pt's level of independence in the home and community environment. Pt prognosis is Good.     Pt's spiritual, cultural and educational needs considered and pt agreeable to plan of care and goals.    Anticipated barriers to physical therapy: None    Goals:  Short Term Goals: 6 weeks  1. Pt will be compliant with HEP 50% of prescribed amount.   2. The pt to demo improvement in R shoulder PROM to by 80% of the L  3.  The pt to demo tolerance to being out of the sling for 24 hours with pain <2/10     Long Term Goals: 24 weeks   1. Pt will be compliant with % of prescribed amount.   2. The pt to improvement in AROM of R shoulder within 80% of L shoulder to improve tolerance to OH movement   3. The pt to  Demo at least 4+/5 of RTC muscle testing to demo improvement in tolerance to activity  4. The pt to tolerate lifting 2# overhead to improve tolerance to ADLs and work related activities   5. The pt will report full participation in ADLs and IADLs without restrictions related to R Shoulder    Plan     Follow Dr. Sherman's medium size RTC repair post op protocol    Brigitte Hopkins, PT , DPT, SCS, FAAOMPT

## 2020-10-02 ENCOUNTER — OFFICE VISIT (OUTPATIENT)
Dept: SPORTS MEDICINE | Facility: CLINIC | Age: 70
End: 2020-10-02
Payer: OTHER MISCELLANEOUS

## 2020-10-02 VITALS
BODY MASS INDEX: 23.96 KG/M2 | WEIGHT: 167.38 LBS | SYSTOLIC BLOOD PRESSURE: 162 MMHG | HEART RATE: 74 BPM | DIASTOLIC BLOOD PRESSURE: 85 MMHG | HEIGHT: 70 IN

## 2020-10-02 DIAGNOSIS — Z98.890 S/P ROTATOR CUFF SURGERY: Primary | ICD-10-CM

## 2020-10-02 PROCEDURE — 99999 PR PBB SHADOW E&M-EST. PATIENT-LVL IV: CPT | Mod: PBBFAC,,, | Performed by: ORTHOPAEDIC SURGERY

## 2020-10-02 PROCEDURE — 99024 POSTOP FOLLOW-UP VISIT: CPT | Mod: S$GLB,,, | Performed by: ORTHOPAEDIC SURGERY

## 2020-10-02 PROCEDURE — 99999 PR PBB SHADOW E&M-EST. PATIENT-LVL IV: ICD-10-PCS | Mod: PBBFAC,,, | Performed by: ORTHOPAEDIC SURGERY

## 2020-10-02 PROCEDURE — 99024 PR POST-OP FOLLOW-UP VISIT: ICD-10-PCS | Mod: S$GLB,,, | Performed by: ORTHOPAEDIC SURGERY

## 2020-10-02 NOTE — PROGRESS NOTES
Chief Complaint: 6 week shoulder surgery f/u    HISTORY OF PRESENT ILLNESS:   Pt is here today for first post-operative followup of shoulder arthroscopy.  he is doing well.  We have reviewed patient's findings and discussed plan of care and future treatment options.      Doing well.    DATE OF PROCEDURE: 08/20/2020  SURGEON: Kayleen Sherman M.D.  OPERATION:   right  1. Shoulder arthroscopic rotator cuff repair 15 x 10 mm, medium, double row (CPT 65020)   2. Shoulder arthroscopic biceps tenodesis (CPT 09262)  3. Shoulder arthroscopic subacromial decompression, bursectomy   4. Shoulder arthroscopic extensive debridement (anterior, posterior glenohumeral joint, subacromial space) (CPT 27469)  5. Shoulder arthroscopic microfracture (Crimson duvet technique) (CPT 30156)  6. Shoulder arthroscopic lysis of adhesions (CPT 47448)  7. Shoulder arthroscopic distal clavicle excision (CPT 80933)  8. Shoulder ORIF greater tuberosity (CPT 11433)                                                                                  PHYSICAL EXAMINATION:     Incision sites healed well  No evidence of any erythema, infection or induration  elbow Range of motion full   Minimal effusion  2+ Radial pulses  No swelling    10/01/2020  Shoulder Passive Range of Motion:   Shoulder Right Left   Flexion    115 180   ER at 0    NT 70   ER at 90    NT 90   IR    L5 NT                                                                                     ASSESSMENT:                                                                                                                                               1. Status post above, doing well.                                                                                                                               PLAN:                                                                                                                                                     1. PT  2. Emphasized scapular function.  3. I  have discussed return to activity in detail.  4. Patient will see us back in 6 weeks.                                      5. All questions were answered and the patient should contact us if he  has any questions or concerns in the interim.

## 2020-10-08 ENCOUNTER — CLINICAL SUPPORT (OUTPATIENT)
Dept: REHABILITATION | Facility: HOSPITAL | Age: 70
End: 2020-10-08
Attending: PHYSICIAN ASSISTANT
Payer: OTHER MISCELLANEOUS

## 2020-10-08 DIAGNOSIS — G89.29 CHRONIC RIGHT SHOULDER PAIN: ICD-10-CM

## 2020-10-08 DIAGNOSIS — M25.511 CHRONIC RIGHT SHOULDER PAIN: ICD-10-CM

## 2020-10-08 DIAGNOSIS — M25.611 DECREASED RANGE OF MOTION OF RIGHT SHOULDER: ICD-10-CM

## 2020-10-08 PROCEDURE — 97140 MANUAL THERAPY 1/> REGIONS: CPT

## 2020-10-08 PROCEDURE — 97110 THERAPEUTIC EXERCISES: CPT

## 2020-10-08 NOTE — PROGRESS NOTES
Physical Therapy Daily Treatment Note     Name: Marcos Barfield  [facility] Number: [id number]    Therapy Diagnosis:   Encounter Diagnoses   Name Primary?    Decreased range of motion of right shoulder     Chronic right shoulder pain      Physician: Nhan Grullon III, *    Visit Date: 10/8/2020  Physician Orders: PT Eval and Treat  Medical Diagnosis from Referral:   S46.011A (ICD-10-CM) - Traumatic tear of right rotator cuff, unspecified tear extent, initial encounter   M75.21 (ICD-10-CM) - Biceps tendinitis of right upper extremity         Evaluation Date: 9/18/2020  Authorization Period Expiration: 11/1/2020  Plan of Care Expiration: 12/31/2020  Visit # / Visits authorized: 4/ 12     Time In: 1000  Time Out: 1040  Total Billable Time: 30 minutes     DOS: 08/20/2020  S/p: 1. Shoulder arthroscopic rotator cuff repair 15 x 10 mm, medium, double row (CPT 24900)   2. Shoulder arthroscopic biceps tenodesis (CPT 38666)  3. Shoulder arthroscopic subacromial decompression, bursectomy   4. Shoulder arthroscopic extensive debridement (anterior, posterior glenohumeral joint, subacromial space) (CPT 76300)  5. Shoulder arthroscopic microfracture (Crimson duvet technique) (CPT 23743)  6. Shoulder arthroscopic lysis of adhesions (CPT 29440)  7. Shoulder arthroscopic distal clavicle excision (CPT 24979)  8. Shoulder ORIF greater tuberosity (CPT 74706)  Post-Op Precautions: We will follow the arthroscopic rotator cuff repair guidelines for a medium size rotator cuff tear.  We discussed with the patient's family after surgery.  The patient will remain in a sling for 4 weeks.  PT to start at 6 weeks.        Precautions: Standard, diabetes       Subjective     Pt reports: went to MD who d/c sling use at home. He has felt OK without his sling on, notes his neck still hurts a bit.     He was compliant with home exercise program.  Response to previous treatment: na  Functional change: na    Pain: 0/10  Location: R sup shoulder       Objective     Daily Measurements:   10/01/2020  Shoulder Passive Range of Motion:   Shoulder Right Left   Flexion   115 180   ER at 0   NT 70   ER at 90   NT 90   IR   To belly NT       Elbow Extension L: 10 from 0 deg    Daily Treatment     Marcos received therapeutic exercises to develop ROM, flexibility and posture for 25 minutes including:  Pulleys 5m   Dowel Flexion 30x 0#   Dowel ER 30x 5s holds 0#   Peanut on wall with cervical AROM x5m    Marcos received the following manual therapy techniques: Joint mobilizations and Soft tissue Mobilization were applied to the: R shoulder/elbow  for 10 minutes, including:  Skilled PROM into shoulder flexion, abduction, ER per post-operative guidelines to improve range of motion and mobility at the glenohumeral joint  Elbow distraction mob grade III   Grade I-III inf 1st and 2nd rib mob   Suboccipital release       Marcos participated in neuromuscular re-education activities to improve: Posture and motor control for 08 minutes. The following activities were included:    Marcos received hot pack for 10 minutes to R shoulder/neck after treatment.        Home Exercises and Patient Education Provided     Education provided:   - Cont to wear sling at night, if wathcing TV take off sling and allow elbow to straighten     Written Home Exercises Provided: Patient instructed to cont prior HEP.  Exercises were reviewed and Marcos was able to demonstrate them prior to the end of the session.  Marcos demonstrated good  understanding of the education provided.     See EMR under patient instructions for exercises given.     Assessment     The pt with good tolerance to progression of exercises with no sig pain in shoulder but c/o pain in his UT area. Discussed motor recruitment patterns at the beginning of AROM and advised to dec scap elevation.     Marcos is progressing well towards his goals.     Pt will continue to benefit from skilled outpatient physical therapy to address the deficits  listed in the problem list box on initial evaluation, provide pt/family education and to maximize pt's level of independence in the home and community environment. Pt prognosis is Good.     Pt's spiritual, cultural and educational needs considered and pt agreeable to plan of care and goals.    Anticipated barriers to physical therapy: None    Goals:  Short Term Goals: 6 weeks  1. Pt will be compliant with HEP 50% of prescribed amount.   2. The pt to demo improvement in R shoulder PROM to by 80% of the L  3.  The pt to demo tolerance to being out of the sling for 24 hours with pain <2/10     Long Term Goals: 24 weeks   1. Pt will be compliant with % of prescribed amount.   2. The pt to improvement in AROM of R shoulder within 80% of L shoulder to improve tolerance to OH movement   3. The pt to  Demo at least 4+/5 of RTC muscle testing to demo improvement in tolerance to activity  4. The pt to tolerate lifting 2# overhead to improve tolerance to ADLs and work related activities   5. The pt will report full participation in ADLs and IADLs without restrictions related to R Shoulder    Plan     Follow Dr. Sherman's medium size RTC repair post op protocol    Brigitte Hopkins, PT , DPT, SCS, FAAOMPT

## 2020-10-13 ENCOUNTER — CLINICAL SUPPORT (OUTPATIENT)
Dept: REHABILITATION | Facility: HOSPITAL | Age: 70
End: 2020-10-13
Attending: STUDENT IN AN ORGANIZED HEALTH CARE EDUCATION/TRAINING PROGRAM
Payer: OTHER MISCELLANEOUS

## 2020-10-13 DIAGNOSIS — G89.29 CHRONIC RIGHT SHOULDER PAIN: ICD-10-CM

## 2020-10-13 DIAGNOSIS — M25.511 CHRONIC RIGHT SHOULDER PAIN: ICD-10-CM

## 2020-10-13 DIAGNOSIS — M25.611 DECREASED RANGE OF MOTION OF RIGHT SHOULDER: ICD-10-CM

## 2020-10-13 PROCEDURE — 97112 NEUROMUSCULAR REEDUCATION: CPT

## 2020-10-13 PROCEDURE — 97110 THERAPEUTIC EXERCISES: CPT

## 2020-10-13 PROCEDURE — 97140 MANUAL THERAPY 1/> REGIONS: CPT

## 2020-10-13 NOTE — PROGRESS NOTES
Physical Therapy Daily Treatment Note     Name: Marcos Barfield  [facility] Number: [id number]    Therapy Diagnosis:   Encounter Diagnoses   Name Primary?    Decreased range of motion of right shoulder     Chronic right shoulder pain      Physician: Nhan Grullon III, *    Visit Date: 10/13/2020  Physician Orders: PT Eval and Treat  Medical Diagnosis from Referral:   S46.011A (ICD-10-CM) - Traumatic tear of right rotator cuff, unspecified tear extent, initial encounter   M75.21 (ICD-10-CM) - Biceps tendinitis of right upper extremity         Evaluation Date: 9/18/2020  Authorization Period Expiration: 11/1/2020  Plan of Care Expiration: 12/31/2020  Visit # / Visits authorized: 4/ 12     Time In: 0948  Time Out: 1040  Total Billable Time: 45 minutes     DOS: 08/20/2020  S/p: 1. Shoulder arthroscopic rotator cuff repair 15 x 10 mm, medium, double row (CPT 34872)   2. Shoulder arthroscopic biceps tenodesis (CPT 89214)  3. Shoulder arthroscopic subacromial decompression, bursectomy   4. Shoulder arthroscopic extensive debridement (anterior, posterior glenohumeral joint, subacromial space) (CPT 69292)  5. Shoulder arthroscopic microfracture (Crimson duvet technique) (CPT 77648)  6. Shoulder arthroscopic lysis of adhesions (CPT 68051)  7. Shoulder arthroscopic distal clavicle excision (CPT 72879)  8. Shoulder ORIF greater tuberosity (CPT 18172)  Post-Op Precautions: We will follow the arthroscopic rotator cuff repair guidelines for a medium size rotator cuff tear.  We discussed with the patient's family after surgery.  The patient will remain in a sling for 4 weeks.  PT to start at 6 weeks.        Precautions: Standard, diabetes       Subjective     Pt reports: went to MD who d/c sling use at home. He has felt OK without his sling on, notes his neck still hurts a bit.     He was compliant with home exercise program.  Response to previous treatment: na  Functional change: na    Pain: 0/10  Location: R sup shoulder       Objective     Daily Measurements:   10/13/2020  Shoulder Passive Range of Motion:   Shoulder Right Left   Flexion   130 180   ER at 0   NT 70   ER at 90   NT 90   IR   To belly NT     Shoulder AROM Flexion: 100     Elbow Extension L: 10 from 0 deg    Daily Treatment     Marcos received therapeutic exercises to develop ROM, flexibility and posture for 30 minutes including:  Pulleys 5m   Dowel Flexion 30x 0#   Slot Machine 30x   Dowel ER 30x 5s holds 0#     Marcos received the following manual therapy techniques: Joint mobilizations and Soft tissue Mobilization were applied to the: R shoulder/elbow  for 12 minutes, including:  Skilled PROM into shoulder flexion, abduction, ER per post-operative guidelines to improve range of motion and mobility at the glenohumeral joint  Post GHJ mobs grade I-II   Inf GHJ mobs grade I-II   Scap Mobs upward rotation  Scap METs upward rotations       Marcos participated in neuromuscular re-education activities to improve: Posture and motor control for 08 minutes. The following activities were included:  Isometric IR/ER holds 4x45s ea ytb     Marcos received hot pack for 10 minutes to R shoulder after treatment.        Home Exercises and Patient Education Provided     Education provided:   - Cont to complete ex as normal     Written Home Exercises Provided: Patient instructed to cont prior HEP.  Exercises were reviewed and Marcos was able to demonstrate them prior to the end of the session.  Marcos demonstrated good  understanding of the education provided.     See EMR under patient instructions for exercises given.     Assessment     The pt responded well to METs to improve LT and serratus force couple to dec early scap elevation with active and AAROM. The pt able to complete all therex with mild increase in shoulder soreness but no sig increase in pain.     Marcos is progressing well towards his goals.     Pt will continue to benefit from skilled outpatient physical therapy to address the  deficits listed in the problem list box on initial evaluation, provide pt/family education and to maximize pt's level of independence in the home and community environment. Pt prognosis is Good.     Pt's spiritual, cultural and educational needs considered and pt agreeable to plan of care and goals.    Anticipated barriers to physical therapy: None    Goals:  Short Term Goals: 6 weeks  1. Pt will be compliant with HEP 50% of prescribed amount. MET  2. The pt to demo improvement in R shoulder PROM to by 80% of the L progressing, not met  3.  The pt to demo tolerance to being out of the sling for 24 hours with pain <2/10 MET     Long Term Goals: 24 weeks (progressing not met)  1. Pt will be compliant with % of prescribed amount.   2. The pt to improvement in AROM of R shoulder within 80% of L shoulder to improve tolerance to OH movement   3. The pt to  Demo at least 4+/5 of RTC muscle testing to demo improvement in tolerance to activity  4. The pt to tolerate lifting 2# overhead to improve tolerance to ADLs and work related activities   5. The pt will report full participation in ADLs and IADLs without restrictions related to R Shoulder    Plan     Follow Dr. Sherman's medium size RTC repair post op protocol    Brigitte Hopkins, PT , DPT, SCS, FAAOMPT

## 2020-10-22 ENCOUNTER — CLINICAL SUPPORT (OUTPATIENT)
Dept: REHABILITATION | Facility: HOSPITAL | Age: 70
End: 2020-10-22
Attending: PHYSICIAN ASSISTANT
Payer: OTHER MISCELLANEOUS

## 2020-10-22 DIAGNOSIS — G89.29 CHRONIC RIGHT SHOULDER PAIN: ICD-10-CM

## 2020-10-22 DIAGNOSIS — M25.611 DECREASED RANGE OF MOTION OF RIGHT SHOULDER: ICD-10-CM

## 2020-10-22 DIAGNOSIS — M25.511 CHRONIC RIGHT SHOULDER PAIN: ICD-10-CM

## 2020-10-22 PROCEDURE — 97140 MANUAL THERAPY 1/> REGIONS: CPT

## 2020-10-22 PROCEDURE — 97110 THERAPEUTIC EXERCISES: CPT

## 2020-10-22 NOTE — PROGRESS NOTES
Physical Therapy Daily Treatment Note     Name: Marcos Barfield  [facility] Number: [id number]    Therapy Diagnosis:   Encounter Diagnoses   Name Primary?    Decreased range of motion of right shoulder     Chronic right shoulder pain      Physician: Nhan Grullon III, *    Visit Date: 10/22/2020  Physician Orders: PT Eval and Treat  Medical Diagnosis from Referral:   S46.011A (ICD-10-CM) - Traumatic tear of right rotator cuff, unspecified tear extent, initial encounter   M75.21 (ICD-10-CM) - Biceps tendinitis of right upper extremity         Evaluation Date: 9/18/2020  Authorization Period Expiration: 11/1/2020  Plan of Care Expiration: 12/31/2020  Visit # / Visits authorized: 4/ 12     Time In: 0948  Time Out: 1040  Total Billable Time: 45 minutes     DOS: 08/20/2020  S/p: 1. Shoulder arthroscopic rotator cuff repair 15 x 10 mm, medium, double row (CPT 46994)   2. Shoulder arthroscopic biceps tenodesis (CPT 14799)  3. Shoulder arthroscopic subacromial decompression, bursectomy   4. Shoulder arthroscopic extensive debridement (anterior, posterior glenohumeral joint, subacromial space) (CPT 26053)  5. Shoulder arthroscopic microfracture (Crimson duvet technique) (CPT 60259)  6. Shoulder arthroscopic lysis of adhesions (CPT 98277)  7. Shoulder arthroscopic distal clavicle excision (CPT 97738)  8. Shoulder ORIF greater tuberosity (CPT 17635)  Post-Op Precautions: We will follow the arthroscopic rotator cuff repair guidelines for a medium size rotator cuff tear.  We discussed with the patient's family after surgery.  The patient will remain in a sling for 4 weeks.  PT to start at 6 weeks.        Precautions: Standard, diabetes       Subjective     Pt reports: his shoulder is a little sore today, tried laying on it the other night and it did not feel good.     He was compliant with home exercise program.  Response to previous treatment: na  Functional change: na    Pain: 0/10  Location: R sup shoulder      Objective      Daily Measurements:   10/13/2020  Shoulder Passive Range of Motion:   Shoulder Right Left   Flexion   155 180   ER at 0   NT 70   ER at 90   NT 90   IR   To belly NT     Shoulder AROM Flexion: 100     Elbow Extension L: 10 from 0 deg    Daily Treatment     Marcos received therapeutic exercises to develop ROM, flexibility and posture for 35 minutes including:  Pulleys 5m   Dowel Flexion 30x 0#   Slot Machine 30x   Sidelying ER 3x10 0#  Wall Slides 30x     Marcos received the following manual therapy techniques: Joint mobilizations and Soft tissue Mobilization were applied to the: R shoulder/elbow  for 12 minutes, including:  Skilled PROM into shoulder flexion, abduction, ER per post-operative guidelines to improve range of motion and mobility at the glenohumeral joint  Post GHJ mobs grade I-II   Inf GHJ mobs grade I-II   Scap Mobs upward rotation  Scap METs upward rotations       Marcos participated in neuromuscular re-education activities to improve: Posture and motor control for 00 minutes. The following activities were included:    Marcos received hot pack for 10 minutes to R shoulder after treatment.        Home Exercises and Patient Education Provided     Education provided:   - avoid laying on shoulder fora f \    Written Home Exercises Provided: Patient instructed to cont prior HEP.  Exercises were reviewed and Marcos was able to demonstrate them prior to the end of the session.  Marcos demonstrated good  understanding of the education provided.     See EMR under patient instructions for exercises given.     Assessment     The pt heavily educated on technique with exercises due to increased stress to shoulder seen during dowel flexion. Th pt was able to complete all therex without sig increase in pain but requested heat following due to soreness. Cont to prog as van.     Marcos is progressing well towards his goals.     Pt will continue to benefit from skilled outpatient physical therapy to address the deficits  listed in the problem list box on initial evaluation, provide pt/family education and to maximize pt's level of independence in the home and community environment. Pt prognosis is Good.     Pt's spiritual, cultural and educational needs considered and pt agreeable to plan of care and goals.    Anticipated barriers to physical therapy: None    Goals:  Short Term Goals: 6 weeks  1. Pt will be compliant with HEP 50% of prescribed amount. MET  2. The pt to demo improvement in R shoulder PROM to by 80% of the L progressing, not met  3.  The pt to demo tolerance to being out of the sling for 24 hours with pain <2/10 MET     Long Term Goals: 24 weeks (progressing not met)  1. Pt will be compliant with % of prescribed amount.   2. The pt to improvement in AROM of R shoulder within 80% of L shoulder to improve tolerance to OH movement   3. The pt to  Demo at least 4+/5 of RTC muscle testing to demo improvement in tolerance to activity  4. The pt to tolerate lifting 2# overhead to improve tolerance to ADLs and work related activities   5. The pt will report full participation in ADLs and IADLs without restrictions related to R Shoulder    Plan     Follow Dr. Sherman's medium size RTC repair post op protocol    Brigitte Hopkins, PT , DPT, SCS, FAAOMPT

## 2020-10-27 ENCOUNTER — CLINICAL SUPPORT (OUTPATIENT)
Dept: REHABILITATION | Facility: HOSPITAL | Age: 70
End: 2020-10-27
Payer: OTHER MISCELLANEOUS

## 2020-10-27 DIAGNOSIS — G89.29 CHRONIC RIGHT SHOULDER PAIN: ICD-10-CM

## 2020-10-27 DIAGNOSIS — M25.511 CHRONIC RIGHT SHOULDER PAIN: ICD-10-CM

## 2020-10-27 DIAGNOSIS — M25.611 DECREASED RANGE OF MOTION OF RIGHT SHOULDER: ICD-10-CM

## 2020-10-27 PROCEDURE — 97110 THERAPEUTIC EXERCISES: CPT

## 2020-10-27 PROCEDURE — 97140 MANUAL THERAPY 1/> REGIONS: CPT

## 2020-10-27 NOTE — PROGRESS NOTES
Physical Therapy Daily Treatment Note     Name: Marcos Barfield  [facility] Number: [id number]    Therapy Diagnosis:   Encounter Diagnoses   Name Primary?    Decreased range of motion of right shoulder     Chronic right shoulder pain      Physician: Nhan Grullon III, *    Visit Date: 10/27/2020  Physician Orders: PT Eval and Treat  Medical Diagnosis from Referral:   S46.011A (ICD-10-CM) - Traumatic tear of right rotator cuff, unspecified tear extent, initial encounter   M75.21 (ICD-10-CM) - Biceps tendinitis of right upper extremity         Evaluation Date: 9/18/2020  Authorization Period Expiration: 11/1/2020  Plan of Care Expiration: 12/31/2020  Visit # / Visits authorized: 4/ 12     Time In: 0955  Time Out: 1100  Total Billable Time: 55 minutes     DOS: 08/20/2020  S/p: 1. Shoulder arthroscopic rotator cuff repair 15 x 10 mm, medium, double row (CPT 53425)   2. Shoulder arthroscopic biceps tenodesis (CPT 55314)  3. Shoulder arthroscopic subacromial decompression, bursectomy   4. Shoulder arthroscopic extensive debridement (anterior, posterior glenohumeral joint, subacromial space) (CPT 02924)  5. Shoulder arthroscopic microfracture (Crimson duvet technique) (CPT 31016)  6. Shoulder arthroscopic lysis of adhesions (CPT 42029)  7. Shoulder arthroscopic distal clavicle excision (CPT 82895)  8. Shoulder ORIF greater tuberosity (CPT 21000)  Post-Op Precautions: We will follow the arthroscopic rotator cuff repair guidelines for a medium size rotator cuff tear.  We discussed with the patient's family after surgery.  The patient will remain in a sling for 4 weeks.  PT to start at 6 weeks.        Precautions: Standard, diabetes       Subjective     Pt reports: his upper trap is still sore and painful, [facility]     He was compliant with home exercise program.  Response to previous treatment: na  Functional change: na    Pain: 0/10  Location: R sup shoulder      Objective     Daily Measurements:    10/27/2020  Shoulder Passive Range of Motion:   Shoulder Right Left   Flexion   160 180   ER at 0   NT 70   ER at 90   NT 90   IR   To belly NT     Shoulder AROM Flexion: 115     Elbow Extension L: 10 from 0 deg    Daily Treatment     Marcos received therapeutic exercises to develop ROM, flexibility and posture for 42 minutes including:  UBE completed for 5/5 min level 4 forward & backward to increase ROM, endurance and decrease pain to improve tolerance to ADLs and age related activities.     Pulleys 5m   Dowel Flexion 30x 0#   Slot Machine 30x   Sidelying ER 3x10 0#  Wall Slides 30x     Marcos received the following manual therapy techniques: Joint mobilizations and Soft tissue Mobilization were applied to the: R shoulder/elbow  for 13 minutes, including:  Skilled PROM into shoulder flexion, abduction, ER per post-operative guidelines to improve range of motion and mobility at the glenohumeral joint  Post GHJ mobs grade I-II   Inf GHJ mobs grade I-II   Scap Mobs upward rotation  Scap METs upward rotations       Marcos participated in neuromuscular re-education activities to improve: Posture and motor control for 00 minutes. The following activities were included:    Marcos received hot pack for 10 minutes to R shoulder after treatment.        Home Exercises and Patient Education Provided     Education provided:   - cont with HEP    Written Home Exercises Provided: Patient instructed to cont prior HEP.  Exercises were reviewed and Marcos was able to demonstrate them prior to the end of the session.  Marcos demonstrated good  understanding of the education provided.     See EMR under patient instructions for exercises given.     Assessment     The pt was able to complete all therex well without increase in pain and improve upward rotation of scapula with less scapula elevation decreasing his neck pain. The pt responded well to all cueing cont to prog as van.     Marcos is progressing well towards his goals.     Pt will  continue to benefit from skilled outpatient physical therapy to address the deficits listed in the problem list box on initial evaluation, provide pt/family education and to maximize pt's level of independence in the home and community environment. Pt prognosis is Good.     Pt's spiritual, cultural and educational needs considered and pt agreeable to plan of care and goals.    Anticipated barriers to physical therapy: None    Goals:  Short Term Goals: 6 weeks  1. Pt will be compliant with HEP 50% of prescribed amount. MET  2. The pt to demo improvement in R shoulder PROM to by 80% of the L progressing, not met  3.  The pt to demo tolerance to being out of the sling for 24 hours with pain <2/10 MET     Long Term Goals: 24 weeks (progressing not met)  1. Pt will be compliant with % of prescribed amount.   2. The pt to improvement in AROM of R shoulder within 80% of L shoulder to improve tolerance to OH movement   3. The pt to  Demo at least 4+/5 of RTC muscle testing to demo improvement in tolerance to activity  4. The pt to tolerate lifting 2# overhead to improve tolerance to ADLs and work related activities   5. The pt will report full participation in ADLs and IADLs without restrictions related to R Shoulder    Plan     Follow Dr. Sherman's medium size RTC repair post op protocol    Brigitte Hopkins, PT , DPT, SCS, FAAOMPT

## 2020-11-04 ENCOUNTER — CLINICAL SUPPORT (OUTPATIENT)
Dept: REHABILITATION | Facility: HOSPITAL | Age: 70
End: 2020-11-04
Payer: OTHER MISCELLANEOUS

## 2020-11-04 DIAGNOSIS — G89.29 CHRONIC RIGHT SHOULDER PAIN: ICD-10-CM

## 2020-11-04 DIAGNOSIS — M25.511 CHRONIC RIGHT SHOULDER PAIN: ICD-10-CM

## 2020-11-04 DIAGNOSIS — M25.611 DECREASED RANGE OF MOTION OF RIGHT SHOULDER: ICD-10-CM

## 2020-11-04 PROCEDURE — 97112 NEUROMUSCULAR REEDUCATION: CPT

## 2020-11-04 PROCEDURE — 97110 THERAPEUTIC EXERCISES: CPT

## 2020-11-04 PROCEDURE — 97140 MANUAL THERAPY 1/> REGIONS: CPT

## 2020-11-04 NOTE — PROGRESS NOTES
Physical Therapy Daily Treatment Note     Name: Marcos Barfield  [facility] Number: [id number]    Therapy Diagnosis:   Encounter Diagnoses   Name Primary?    Decreased range of motion of right shoulder     Chronic right shoulder pain      Physician: Nhan Grullon III, *    Visit Date: 11/4/2020  Physician Orders: PT Eval and Treat  Medical Diagnosis from Referral:   S46.011A (ICD-10-CM) - Traumatic tear of right rotator cuff, unspecified tear extent, initial encounter   M75.21 (ICD-10-CM) - Biceps tendinitis of right upper extremity         Evaluation Date: 9/18/2020  Authorization Period Expiration: 11/1/2020  Plan of Care Expiration: 12/31/2020  Visit # / Visits authorized: 5/ 12     Time In: 0835  Time Out: 0930  Total Billable Time: 55 minutes     DOS: 08/20/2020  S/p: 1. Shoulder arthroscopic rotator cuff repair 15 x 10 mm, medium, double row (CPT 18436)   2. Shoulder arthroscopic biceps tenodesis (CPT 56147)  3. Shoulder arthroscopic subacromial decompression, bursectomy   4. Shoulder arthroscopic extensive debridement (anterior, posterior glenohumeral joint, subacromial space) (CPT 02780)  5. Shoulder arthroscopic microfracture (Crimson duvet technique) (CPT 89415)  6. Shoulder arthroscopic lysis of adhesions (CPT 92853)  7. Shoulder arthroscopic distal clavicle excision (CPT 31151)  8. Shoulder ORIF greater tuberosity (CPT 85510)  Post-Op Precautions: We will follow the arthroscopic rotator cuff repair guidelines for a medium size rotator cuff tear.  We discussed with the patient's family after surgery.  The patient will remain in a sling for 4 weeks.  PT to start at 6 weeks.        Precautions: Standard, diabetes       Subjective     Pt reports:he was sleeping on his L side and his R shoulder became sore. Always feels sore in the AM but then loosens up.     He was compliant with home exercise program.  Response to previous treatment: na  Functional change: na    Pain: 2/10  Location: R lateral arm       Objective     Daily Measurements:   11/4/2020  Shoulder Passive Range of Motion:   Shoulder Right Left   Flexion   160 180   ER at 0   60 70   ER at 90   60 90   IR   To belly NT     Shoulder AROM Flexion: 120       Daily Treatment     Marcos received therapeutic exercises to develop ROM, flexibility and posture for 25 minutes including:  UBE completed for 5/5 min level 6 forward & backward to increase ROM, endurance and decrease pain to improve tolerance to ADLs and age related activities.     Pulleys 5m   Slot Machine 30x 2#  Wall Slides 5x5    Marcos received the following manual therapy techniques: Joint mobilizations and Soft tissue Mobilization were applied to the: R shoulder/elbow  for 18 minutes, including:  Skilled PROM into shoulder flexion, abduction, ER per post-operative guidelines to improve range of motion and mobility at the glenohumeral joint  Post GHJ mobs grade I-II   Inf GHJ mobs grade I-II   Scap Mobs upward rotation  Scap METs upward rotations       Marcos participated in neuromuscular re-education activities to improve: Posture and motor control for 12 minutes. The following activities were included:  AROM Shoulder Flex 20x   Sidelying ER 3x10 0#    Marcos received hot pack for 10 minutes to R shoulder after treatment.        Home Exercises and Patient Education Provided     Education provided:   - cont with HEP    Written Home Exercises Provided: Patient instructed to cont prior HEP.  Exercises were reviewed and Marcos was able to demonstrate them prior to the end of the session.  Marcos demonstrated good  understanding of the education provided.     See EMR under patient instructions for exercises given.     Assessment   10 weeks 6 days s/p MD RTC repair     The pt with excellent tolerance to all therex with only minimal discomfort in shoulder during PROM and some AAROM movements. The pt van progression of exercises to include gravity minimized AROM. Cont to prog as van.     Jules is  progressing well towards his goals.     Pt will continue to benefit from skilled outpatient physical therapy to address the deficits listed in the problem list box on initial evaluation, provide pt/family education and to maximize pt's level of independence in the home and community environment. Pt prognosis is Good.     Pt's spiritual, cultural and educational needs considered and pt agreeable to plan of care and goals.    Anticipated barriers to physical therapy: None    Goals:  Short Term Goals: 6 weeks  1. Pt will be compliant with HEP 50% of prescribed amount. MET  2. The pt to demo improvement in R shoulder PROM to by 80% of the L progressing, not met  3.  The pt to demo tolerance to being out of the sling for 24 hours with pain <2/10 MET     Long Term Goals: 24 weeks (progressing not met)  1. Pt will be compliant with % of prescribed amount.   2. The pt to improvement in AROM of R shoulder within 80% of L shoulder to improve tolerance to OH movement   3. The pt to  Demo at least 4+/5 of RTC muscle testing to demo improvement in tolerance to activity  4. The pt to tolerate lifting 2# overhead to improve tolerance to ADLs and work related activities   5. The pt will report full participation in ADLs and IADLs without restrictions related to R Shoulder    Plan     Follow Dr. Sherman's medium size RTC repair post op protocol    Brigitte Hopkins, PT , DPT, SCS, FAAOMPT

## 2020-11-09 ENCOUNTER — CLINICAL SUPPORT (OUTPATIENT)
Dept: REHABILITATION | Facility: HOSPITAL | Age: 70
End: 2020-11-09
Payer: OTHER MISCELLANEOUS

## 2020-11-09 DIAGNOSIS — M25.511 CHRONIC RIGHT SHOULDER PAIN: ICD-10-CM

## 2020-11-09 DIAGNOSIS — M25.611 DECREASED RANGE OF MOTION OF RIGHT SHOULDER: ICD-10-CM

## 2020-11-09 DIAGNOSIS — G89.29 CHRONIC RIGHT SHOULDER PAIN: ICD-10-CM

## 2020-11-09 PROCEDURE — 97110 THERAPEUTIC EXERCISES: CPT

## 2020-11-09 PROCEDURE — 97112 NEUROMUSCULAR REEDUCATION: CPT

## 2020-11-09 PROCEDURE — 97010 HOT OR COLD PACKS THERAPY: CPT

## 2020-11-09 PROCEDURE — 97140 MANUAL THERAPY 1/> REGIONS: CPT

## 2020-11-09 NOTE — PROGRESS NOTES
Physical Therapy Daily Treatment Note     Name: Marcos Barfield  [facility] Number: [id number]    Therapy Diagnosis:   Encounter Diagnoses   Name Primary?    Decreased range of motion of right shoulder     Chronic right shoulder pain      Physician: Nhan Grullon III, *    Visit Date: 11/9/2020  Physician Orders: PT Eval and Treat  Medical Diagnosis from [facility]  S46.011A (ICD-10-CM) - Traumatic tear of right rotator cuff, unspecified tear extent, initial encounter   M75.21 (ICD-10-CM) - Biceps tendinitis of right upper extremity         Evaluation Date: 9/18/2020  Authorization Period Expiration: 11/1/2020  Plan of Care Expiration: 12/31/2020  Visit # / Visits authorized: 6/ 12     Time In: 0800  Time Out: 0900  Total Billable Time: 45 minutes     DOS: 08/20/2020  S/p: 1. Shoulder arthroscopic rotator cuff repair 15 x 10 mm, medium, double row (CPT 64151)   2. Shoulder arthroscopic biceps tenodesis (CPT 05145)  3. Shoulder arthroscopic subacromial decompression, bursectomy   4. Shoulder arthroscopic extensive debridement (anterior, posterior glenohumeral joint, subacromial space) (CPT 02972)  5. Shoulder arthroscopic microfracture (Crimson duvet technique) (CPT 79452)  6. Shoulder arthroscopic lysis of adhesions (CPT 49048)  7. Shoulder arthroscopic distal clavicle excision (CPT 36972)  8. Shoulder ORIF greater tuberosity (CPT 03272)  Post-Op Precautions: We will follow the arthroscopic rotator cuff repair guidelines for a medium size rotator cuff tear.  We discussed with the patient's family after surgery.  The patient will remain in a sling for 4 weeks.  PT to start at 6 weeks.        Precautions: Standard, diabetes       Subjective     Pt reports: he has shoulder pain mainly at night when laying on his L side, tries to use pillows to prop arms or lay on his back.     He was compliant with home exercise program.  Response to previous treatment: na  Functional change: na    Pain: 2/10  Location: R lateral arm       Objective     Daily Measurements:   11/4/2020  Shoulder Passive Range of Motion:   Shoulder Right Left   Flexion   165 180   ER at 0   60 70   ER at 90   60 90   IR   To belly NT     Shoulder AROM Flexion: 120       Daily Treatment     Mracos received therapeutic exercises to develop ROM, flexibility and posture for 18 minutes including:  UBE completed for 5/5 min level 6 forward & backward to increase ROM, endurance and decrease pain to improve tolerance to ADLs and age related activities.     Pulleys 5m   Slot Machine 30x 2#  Wall Slides 30x    Marcos received the following manual therapy techniques: Joint mobilizations and Soft tissue Mobilization were applied to the: R shoulder/elbow  for 10 minutes, including:  Skilled PROM into shoulder flexion, abduction, ER per post-operative guidelines to improve range of motion and mobility at the glenohumeral joint  Post GHJ mobs grade I-II   Inf GHJ mobs grade I-II       Marcos participated in neuromuscular re-education activities to improve: Posture and motor control for 17 minutes. The following activities were included:  AROM scaption 20x   Shoulder ABD Sidelying 4x10    Marcos received hot pack for 10 minutes to R shoulder after treatment.        Home Exercises and Patient Education Provided     Education provided:   - cont with HEP    Written Home Exercises Provided: Patient instructed to cont prior HEP.  Exercises were reviewed and Marcos was able to demonstrate them prior to the end of the session.  Marcos demonstrated good  understanding of the education provided.     See EMR under patient instructions for exercises given.     Assessment   10 weeks 6 days s/p MD RTC repair     Advised patient to sleep on back more than side to decrease irritating hsi shoulder at night. The patient was able to complete all therex with mild soreness in shoulder following but improvement in upward rotation and dec scap elevation during scaption.     Marcos is progressing well towards  his goals.     Pt will continue to benefit from skilled outpatient physical therapy to address the deficits listed in the problem list box on initial evaluation, provide pt/family education and to maximize pt's level of independence in the home and community environment. Pt prognosis is Good.     Pt's spiritual, cultural and educational needs considered and pt agreeable to plan of care and goals.    Anticipated barriers to physical therapy: None    Goals:  Short Term Goals: 6 weeks  1. Pt will be compliant with HEP 50% of prescribed amount. MET  2. The pt to demo improvement in R shoulder PROM to by 80% of the L progressing, not met  3.  The pt to demo tolerance to being out of the sling for 24 hours with pain <2/10 MET     Long Term Goals: 24 weeks (progressing not met)  1. Pt will be compliant with % of prescribed amount.   2. The pt to improvement in AROM of R shoulder within 80% of L shoulder to improve tolerance to OH movement   3. The pt to  Demo at least 4+/5 of RTC muscle testing to demo improvement in tolerance to activity  4. The pt to tolerate lifting 2# overhead to improve tolerance to ADLs and work related activities   5. The pt will report full participation in ADLs and IADLs without restrictions related to R Shoulder    Plan     Follow Dr. Sherman's medium size RTC repair post op protocol    Brigitte Hopkins, PT , DPT, SCS, FAAOMPT

## 2020-11-19 ENCOUNTER — CLINICAL SUPPORT (OUTPATIENT)
Dept: REHABILITATION | Facility: HOSPITAL | Age: 70
End: 2020-11-19
Payer: OTHER MISCELLANEOUS

## 2020-11-19 DIAGNOSIS — G89.29 CHRONIC RIGHT SHOULDER PAIN: ICD-10-CM

## 2020-11-19 DIAGNOSIS — M25.511 CHRONIC RIGHT SHOULDER PAIN: ICD-10-CM

## 2020-11-19 DIAGNOSIS — M25.611 DECREASED RANGE OF MOTION OF RIGHT SHOULDER: ICD-10-CM

## 2020-11-19 PROCEDURE — 97140 MANUAL THERAPY 1/> REGIONS: CPT

## 2020-11-19 PROCEDURE — 97112 NEUROMUSCULAR REEDUCATION: CPT

## 2020-11-19 PROCEDURE — 97110 THERAPEUTIC EXERCISES: CPT

## 2020-11-19 NOTE — PROGRESS NOTES
Physical Therapy Daily Treatment Note     Name: Marcos Barfield  [facility] Number: [id number]    Therapy Diagnosis:   Encounter Diagnoses   Name Primary?    Decreased range of motion of right shoulder     Chronic right shoulder pain      Physician: Nhan Grullon III, *    Visit Date: 11/19/2020  Physician Orders: PT Eval and Treat  Medical Diagnosis from [facility]  S46.011A (ICD-10-CM) - Traumatic tear of right rotator cuff, unspecified tear extent, initial encounter   M75.21 (ICD-10-CM) - Biceps tendinitis of right upper extremity         Evaluation Date: 9/18/2020  Authorization Period Expiration: 11/1/2020  Plan of Care Expiration: 12/31/2020  Visit # / Visits authorized: 6/ 12     Time In: 0800  Time Out: 0900  Total Billable Time: 45 minutes     DOS: 08/20/2020  S/p: 1. Shoulder arthroscopic rotator cuff repair 15 x 10 mm, medium, double row (CPT 12843)   2. Shoulder arthroscopic biceps tenodesis (CPT 50946)  3. Shoulder arthroscopic subacromial decompression, bursectomy   4. Shoulder arthroscopic extensive debridement (anterior, posterior glenohumeral joint, subacromial space) (CPT 52757)  5. Shoulder arthroscopic microfracture (Crimson duvet technique) (CPT 56819)  6. Shoulder arthroscopic lysis of adhesions (CPT 80333)  7. Shoulder arthroscopic distal clavicle excision (CPT 50973)  8. Shoulder ORIF greater tuberosity (CPT 22255)  Post-Op Precautions: We will follow the arthroscopic rotator cuff repair guidelines for a medium size rotator cuff tear.  We discussed with the patient's family after surgery.  The patient will remain in a sling for 4 weeks.  PT to start at 6 weeks.        Precautions: Standard, diabetes       Subjective     Pt reports: he has shoulder pain mainly at night when laying on his L side has been trying tno to use pillows. Has no pain while sleeping on hsi back. Overall has not much pain during the day.     He was compliant with home exercise program.  Response to previous treatment:  na  Functional change: na    Pain: 2/10  Location: R lateral arm      Objective     Daily Measurements:   11/4/2020  Shoulder Passive Range of Motion:   Shoulder Right Left   Flexion   165 180   ER at 0   60 70   ER at 90   60 90   IR   To belly NT     Shoulder AROM Flexion: 120       Daily Treatment     Marcos received therapeutic exercises to develop ROM, flexibility and posture for 22 minutes including:  UBE completed for 5/5 min level 6 forward & backward to increase ROM, endurance and decrease pain to improve tolerance to ADLs and age related activities.   Pulleys 5m   Scaption 0# 10x; 1# 5x5   Slot Machine 30x 2#    Marcos received the following manual therapy techniques: Joint mobilizations and Soft tissue Mobilization were applied to the: R shoulder/elbow  for 10 minutes, including:  Skilled PROM into shoulder flexion, abduction, ER per post-operative guidelines to improve range of motion and mobility at the glenohumeral joint  Post GHJ mobs grade I-II   Inf GHJ mobs grade I-II       Marcos participated in neuromuscular re-education activities to improve: Posture and motor control for 12 minutes. The following activities were included:  Resisted IR 3x10 gtb   Resisted ER ytb 3x10     Marcos received hot pack for 10 minutes to R shoulder after treatment.        Home Exercises and Patient Education Provided     Education provided:   - cont with HEP    Written Home Exercises Provided: Patient instructed to cont prior HEP.  Exercises were reviewed and Marcos was able to demonstrate them prior to the end of the session.  Marcos demonstrated good  understanding of the education provided.     See EMR under patient instructions for exercises given.     Assessment   13 weeks s/p MD RTC repair     The patient is making excellent progress in regards to ROM and activity tolerance. The pt heavily educated on how side sleeping at this time would lead to some shoulder soreness and to try to sleep on his back as this causes the  lease amount of symptoms.     Marcos is progressing well towards his goals.     Pt will continue to benefit from skilled outpatient physical therapy to address the deficits listed in the problem list box on initial evaluation, provide pt/family education and to maximize pt's level of independence in the home and community environment. Pt prognosis is Good.     Pt's spiritual, cultural and educational needs considered and pt agreeable to plan of care and goals.    Anticipated barriers to physical therapy: None    Goals:  Short Term Goals: 6 weeks  1. Pt will be compliant with HEP 50% of prescribed amount. MET  2. The pt to demo improvement in R shoulder PROM to by 80% of the L progressing, not met  3.  The pt to demo tolerance to being out of the sling for 24 hours with pain <2/10 MET     Long Term Goals: 24 weeks (progressing not met)  1. Pt will be compliant with % of prescribed amount.   2. The pt to improvement in AROM of R shoulder within 80% of L shoulder to improve tolerance to OH movement   3. The pt to  Demo at least 4+/5 of RTC muscle testing to demo improvement in tolerance to activity  4. The pt to tolerate lifting 2# overhead to improve tolerance to ADLs and work related activities   5. The pt will report full participation in ADLs and IADLs without restrictions related to R Shoulder    Plan     Follow Dr. Sherman's medium size RTC repair post op protocol    Brigitte Hopkins, PT , DPT, SCS, FAAOMPT

## 2020-11-23 ENCOUNTER — OFFICE VISIT (OUTPATIENT)
Dept: SPORTS MEDICINE | Facility: CLINIC | Age: 70
End: 2020-11-23
Payer: OTHER MISCELLANEOUS

## 2020-11-23 VITALS
HEART RATE: 78 BPM | WEIGHT: 167 LBS | HEIGHT: 70 IN | DIASTOLIC BLOOD PRESSURE: 83 MMHG | BODY MASS INDEX: 23.91 KG/M2 | SYSTOLIC BLOOD PRESSURE: 161 MMHG

## 2020-11-23 DIAGNOSIS — Z98.890 S/P ROTATOR CUFF SURGERY: Primary | ICD-10-CM

## 2020-11-23 PROCEDURE — 99999 PR PBB SHADOW E&M-EST. PATIENT-LVL III: ICD-10-PCS | Mod: PBBFAC,,, | Performed by: ORTHOPAEDIC SURGERY

## 2020-11-23 PROCEDURE — 99024 PR POST-OP FOLLOW-UP VISIT: ICD-10-PCS | Mod: S$GLB,,, | Performed by: ORTHOPAEDIC SURGERY

## 2020-11-23 PROCEDURE — 99999 PR PBB SHADOW E&M-EST. PATIENT-LVL III: CPT | Mod: PBBFAC,,, | Performed by: ORTHOPAEDIC SURGERY

## 2020-11-23 PROCEDURE — 99024 POSTOP FOLLOW-UP VISIT: CPT | Mod: S$GLB,,, | Performed by: ORTHOPAEDIC SURGERY

## 2020-11-23 NOTE — PROGRESS NOTES
Chief Complaint: 12 week shoulder surgery f/u    HISTORY OF PRESENT ILLNESS:   Pt is here today for followup of shoulder arthroscopy.  he is doing well.  We have reviewed patient's findings and discussed plan of care and future treatment options.      Doing well.    DATE OF PROCEDURE: 08/20/2020  SURGEON: Kayleen Sherman M.D.  OPERATION:   right  1. Shoulder arthroscopic rotator cuff repair 15 x 10 mm, medium, double row (CPT 96310)   2. Shoulder arthroscopic biceps tenodesis (CPT 60947)  3. Shoulder arthroscopic subacromial decompression, bursectomy   4. Shoulder arthroscopic extensive debridement (anterior, posterior glenohumeral joint, subacromial space) (CPT 65729)  5. Shoulder arthroscopic microfracture (Crimson duvet technique) (CPT 94995)  6. Shoulder arthroscopic lysis of adhesions (CPT 32341)  7. Shoulder arthroscopic distal clavicle excision (CPT 97130)  8. Shoulder ORIF greater tuberosity (CPT 14923)                                                                                  PHYSICAL EXAMINATION:     Incision sites healed well  No evidence of any erythema, infection or induration  elbow Range of motion full   Minimal effusion  2+ Radial pulses  No swelling      ER 0 30  IR L1  scaption 5/5 at 0 and 30                                                                                   ASSESSMENT:                                                                                                                                               1. Status post above, doing well.                                                                                                                               PLAN:                                                                                                                                                     1. PT  2. Emphasized scapular function.  3. I have discussed return to activity in detail.  4. Patient will see us back in 12 weeks.                                       5. All questions were answered and the patient should contact us if he  has any questions or concerns in the interim.

## 2020-11-24 ENCOUNTER — CLINICAL SUPPORT (OUTPATIENT)
Dept: REHABILITATION | Facility: HOSPITAL | Age: 70
End: 2020-11-24
Payer: OTHER MISCELLANEOUS

## 2020-11-24 DIAGNOSIS — G89.29 CHRONIC RIGHT SHOULDER PAIN: ICD-10-CM

## 2020-11-24 DIAGNOSIS — M25.611 DECREASED RANGE OF MOTION OF RIGHT SHOULDER: ICD-10-CM

## 2020-11-24 DIAGNOSIS — M25.511 CHRONIC RIGHT SHOULDER PAIN: ICD-10-CM

## 2020-11-24 PROCEDURE — 97110 THERAPEUTIC EXERCISES: CPT

## 2020-11-24 PROCEDURE — 97140 MANUAL THERAPY 1/> REGIONS: CPT

## 2020-11-24 PROCEDURE — 97112 NEUROMUSCULAR REEDUCATION: CPT

## 2020-11-24 NOTE — PROGRESS NOTES
Physical Therapy Daily Treatment Note     Name: Marcos Barfield  [facility] Number: [id number]    Therapy Diagnosis:   Encounter Diagnoses   Name Primary?    Decreased range of motion of right shoulder     Chronic right shoulder pain      Physician: Nhan Grullon III, *    Visit Date: 11/24/2020  Physician Orders: PT Eval and Treat  Medical Diagnosis from [facility]  S46.011A (ICD-10-CM) - Traumatic tear of right rotator cuff, unspecified tear extent, initial encounter   M75.21 (ICD-10-CM) - Biceps tendinitis of right upper extremity         Evaluation Date: 9/18/2020  Authorization Period Expiration: 11/1/2020  Plan of Care Expiration: 12/31/2020  Visit # / Visits authorized: 11/ 12     Time In: 0900  Time Out: 1000  Total Billable Time: 35 minutes     DOS: 08/20/2020  S/p: 1. Shoulder arthroscopic rotator cuff repair 15 x 10 mm, medium, double row (CPT 66927)   2. Shoulder arthroscopic biceps tenodesis (CPT 97998)  3. Shoulder arthroscopic subacromial decompression, bursectomy   4. Shoulder arthroscopic extensive debridement (anterior, posterior glenohumeral joint, subacromial space) (CPT 04528)  5. Shoulder arthroscopic microfracture (Crimson duvet technique) (CPT 59965)  6. Shoulder arthroscopic lysis of adhesions (CPT 20420)  7. Shoulder arthroscopic distal clavicle excision (CPT 78420)  8. Shoulder ORIF greater tuberosity (CPT 99096)  Post-Op Precautions: We will follow the arthroscopic rotator cuff repair guidelines for a medium size rotator cuff tear.  We discussed with the patient's family after surgery.  The patient will remain in a sling for 4 weeks.  PT to start at 6 weeks.        Precautions: Standard, diabetes       Subjective     Pt reports: he has shoulder pain mainly at night when laying on his L side has been trying tno to use pillows. Has no pain while sleeping on hsi back. Overall has not much pain during the day.     He was compliant with home exercise program.  Response to previous treatment:  na  Functional change: na    Pain: 2/10  Location: R lateral arm      Objective     Daily Measurements:   11/4/2020  Shoulder Passive Range of Motion:   Shoulder Right Left   Flexion   165 180   ER at 0   70 70   ER at 90   70 90   IR   To belly NT     Shoulder AROM Flexion: 135       Daily Treatment     Marcos received therapeutic exercises to develop ROM, flexibility and posture for 25 minutes including:  UBE completed for 5/5 min level 6 forward & backward to increase ROM, endurance and decrease pain to improve tolerance to ADLs and age related activities.   Pulleys 5m   Bicep Curls 1# 3x10  Slot Machine 40x 2.5#    Marcos received the following manual therapy techniques: Joint mobilizations and Soft tissue Mobilization were applied to the: R shoulder/elbow  for 10 minutes, including:  Skilled PROM into shoulder flexion, abduction, ER per post-operative guidelines to improve range of motion and mobility at the glenohumeral joint  Post GHJ mobs grade I-II   Inf GHJ mobs grade I-II       Marcos participated in neuromuscular re-education activities to improve: Posture and motor control for 12 minutes. The following activities were included:  Sidelying ER 0# 4x10   Wall slide 1# 4x10   ABCs on wall 4x     Marcos received hot pack for 10 minutes to R shoulder after treatment.        Home Exercises and Patient Education Provided     Education provided:   - cont with HEP    Written Home Exercises Provided: Patient instructed to cont prior HEP.  Exercises were reviewed and Marcos was able to demonstrate them prior to the end of the session.  Marcos demonstrated good  understanding of the education provided.     See EMR under patient instructions for exercises given.     Assessment   13 weeks s/p MD RTC repair     The pt was able to complete all progression of exercises well without increase in shoulder soreness but reported fatigue. Responded well to cueing to dec UT activation and improve serratus/LT activation. Cont to prog  as vanVince Rodríguez is progressing well towards his goals.     Pt will continue to benefit from skilled outpatient physical therapy to address the deficits listed in the problem list box on initial evaluation, provide pt/family education and to maximize pt's level of independence in the home and community environment. Pt prognosis is Good.     Pt's spiritual, cultural and educational needs considered and pt agreeable to plan of care and goals.    Anticipated barriers to physical therapy: None    Goals:  Short Term Goals: 6 weeks  1. Pt will be compliant with HEP 50% of prescribed amount. MET  2. The pt to demo improvement in R shoulder PROM to by 80% of the L MET  3.  The pt to demo tolerance to being out of the sling for 24 hours with pain <2/10 MET     Long Term Goals: 24 weeks (progressing not met)  1. Pt will be compliant with % of prescribed amount. MET  2. The pt to improvement in AROM of R shoulder within 80% of L shoulder to improve tolerance to OH movement progressing not met  3. The pt to  Demo at least 4+/5 of RTC muscle testing to demo improvement in tolerance to activity progressing not met   4. The pt to tolerate lifting 2# overhead to improve tolerance to ADLs and work related activities  progressing not met   5. The pt will report full participation in ADLs and IADLs without restrictions related to R Shoulder progressing not met     Plan     Follow Dr. Sherman's medium size RTC repair post op protocol    Brigitte Hopkins, PT , DPT, SCS, FAAOMPT

## 2020-12-01 ENCOUNTER — CLINICAL SUPPORT (OUTPATIENT)
Dept: REHABILITATION | Facility: HOSPITAL | Age: 70
End: 2020-12-01
Payer: OTHER MISCELLANEOUS

## 2020-12-01 DIAGNOSIS — M25.611 DECREASED RANGE OF MOTION OF RIGHT SHOULDER: ICD-10-CM

## 2020-12-01 DIAGNOSIS — M25.511 CHRONIC RIGHT SHOULDER PAIN: ICD-10-CM

## 2020-12-01 DIAGNOSIS — G89.29 CHRONIC RIGHT SHOULDER PAIN: ICD-10-CM

## 2020-12-01 PROCEDURE — 97110 THERAPEUTIC EXERCISES: CPT

## 2020-12-01 PROCEDURE — 97112 NEUROMUSCULAR REEDUCATION: CPT

## 2020-12-01 PROCEDURE — 97530 THERAPEUTIC ACTIVITIES: CPT

## 2020-12-01 NOTE — PROGRESS NOTES
Physical Therapy Daily Treatment Note     Name: Marcos Barfield  [facility] Number: [id number]    Therapy Diagnosis:   Encounter Diagnoses   Name Primary?    Decreased range of motion of right shoulder     Chronic right shoulder pain      Physician: Mk Dennison MD    Visit Date: 12/1/2020  Physician Orders: PT Eval and Treat  Medical Diagnosis from [facility]  S46.011A (ICD-10-CM) - Traumatic tear of right rotator cuff, unspecified tear extent, initial encounter   M75.21 (ICD-10-CM) - Biceps tendinitis of right upper extremity         Evaluation Date: 9/18/2020  Authorization Period Expiration: 11/1/2020  Plan of Care Expiration: 12/31/2020  Visit # / Visits authorized: 1/ 12   Visit Total: 13    Time In: 0900  Time Out: 1000  Total Billable Time: 35 minutes     DOS: 08/20/2020  S/p: 1. Shoulder arthroscopic rotator cuff repair 15 x 10 mm, medium, double row (CPT 91991)   2. Shoulder arthroscopic biceps tenodesis (CPT 97428)  3. Shoulder arthroscopic subacromial decompression, bursectomy   4. Shoulder arthroscopic extensive debridement (anterior, posterior glenohumeral joint, subacromial space) (CPT 53423)  5. Shoulder arthroscopic microfracture (Crimson duvet technique) (CPT 82184)  6. Shoulder arthroscopic lysis of adhesions (CPT 38698)  7. Shoulder arthroscopic distal clavicle excision (CPT 67493)  8. Shoulder ORIF greater tuberosity (CPT 44447)  Post-Op Precautions: We will follow the arthroscopic rotator cuff repair guidelines for a medium size rotator cuff tear.  We discussed with the patient's family after surgery.  The patient will remain in a sling for 4 weeks.  PT to start at 6 weeks.        Precautions: Standard, diabetes       Subjective     Pt reports: his neck hurts a bit today and has been having continued night pain in his shoulder.     He was compliant with home exercise program.  Response to previous treatment: na  Functional change: na    Pain: 2/10  Location: R lateral arm      Objective     Daily  Measurements:   11/4/2020  Shoulder Passive Range of Motion:   Shoulder Right Left   Flexion   165 180   ER at 0   70 70   ER at 90   70 90   IR   To belly NT     Shoulder AROM Flexion: 135       Daily Treatment     Marcos received therapeutic exercises to develop ROM, flexibility and posture for 25 minutes including:  UBE completed for 5/5 min level 6 forward & backward to increase ROM, endurance and decrease pain to improve tolerance to ADLs and age related activities.   Pulleys 5m   Cervical Rot 1st rib mob 4x5 B    Wall slides 30x     Marcos received the following manual therapy techniques: Joint mobilizations and Soft tissue Mobilization were applied to the: R shoulder/elbow  for 10 minutes, including:  Skilled PROM into shoulder flexion, abduction, ER per post-operative guidelines to improve range of motion and mobility at the glenohumeral joint  Post GHJ mobs grade I-II   Inf GHJ mobs grade I-II       Marcos participated in neuromuscular re-education activities to improve: Posture and motor control for 12 minutes. The following activities were included:  Shoulder flexion 1# 3x15   Shoulder Sidelying ABD 0# 3x15     Marcos received hot pack for 10 minutes to R shoulder after treatment.        Home Exercises and Patient Education Provided     Education provided:   - cont with HEP    Written Home Exercises Provided: Patient instructed to cont prior HEP.  Exercises were reviewed and Marcos was able to demonstrate them prior to the end of the session.  Marcos demonstrated good  understanding of the education provided.     See EMR under patient instructions for exercises given.     Assessment   13 weeks s/p MD RTC repair     The pt with sig improvement in ROM supine due to weakness standing, good tolerance to therex with reports of improvement in pain following neck stretches. Will cont to prog as van.     Marcos is progressing well towards his goals.     Pt will continue to benefit from skilled outpatient physical  therapy to address the deficits listed in the problem list box on initial evaluation, provide pt/family education and to maximize pt's level of independence in the home and community environment. Pt prognosis is Good.     Pt's spiritual, cultural and educational needs considered and pt agreeable to plan of care and goals.    Anticipated barriers to physical therapy: None    Goals:  Short Term Goals: 6 weeks  1. Pt will be compliant with HEP 50% of prescribed amount. MET  2. The pt to demo improvement in R shoulder PROM to by 80% of the L MET  3.  The pt to demo tolerance to being out of the sling for 24 hours with pain <2/10 MET     Long Term Goals: 24 weeks (progressing not met)  1. Pt will be compliant with % of prescribed amount. MET  2. The pt to improvement in AROM of R shoulder within 80% of L shoulder to improve tolerance to OH movement progressing not met  3. The pt to  Demo at least 4+/5 of RTC muscle testing to demo improvement in tolerance to activity progressing not met   4. The pt to tolerate lifting 2# overhead to improve tolerance to ADLs and work related activities  progressing not met   5. The pt will report full participation in ADLs and IADLs without restrictions related to R Shoulder progressing not met     Plan     Follow Dr. Sherman's medium size RTC repair post op protocol    Brigitte Hopkins, PT , DPT, SCS, FAAOMPT

## 2020-12-08 ENCOUNTER — CLINICAL SUPPORT (OUTPATIENT)
Dept: REHABILITATION | Facility: HOSPITAL | Age: 70
End: 2020-12-08
Payer: OTHER MISCELLANEOUS

## 2020-12-08 DIAGNOSIS — M25.611 DECREASED RANGE OF MOTION OF RIGHT SHOULDER: ICD-10-CM

## 2020-12-08 DIAGNOSIS — G89.29 CHRONIC RIGHT SHOULDER PAIN: ICD-10-CM

## 2020-12-08 DIAGNOSIS — M25.511 CHRONIC RIGHT SHOULDER PAIN: ICD-10-CM

## 2020-12-08 PROCEDURE — 97112 NEUROMUSCULAR REEDUCATION: CPT

## 2020-12-08 PROCEDURE — 97110 THERAPEUTIC EXERCISES: CPT

## 2020-12-09 NOTE — PROGRESS NOTES
Physical Therapy Daily Treatment Note     Name: Marcos Barfield  [facility] Number: [id number]    Therapy Diagnosis:   Encounter Diagnoses   Name Primary?    Decreased range of motion of right shoulder     Chronic right shoulder pain      Physician: Mk Dennison MD    Visit Date: 12/8/2020  Physician Orders: PT Eval and Treat  Medical Diagnosis from [facility]  S46.011A (ICD-10-CM) - Traumatic tear of right rotator cuff, unspecified tear extent, initial encounter   M75.21 (ICD-10-CM) - Biceps tendinitis of right upper extremity         Evaluation Date: 9/18/2020  Authorization Period Expiration: 11/1/2020  Plan of Care Expiration: 12/31/2020  Visit # / Visits authorized: 2/ 12   Visit Total: 14    Time In: 0900  Time Out: 1000  Total Billable Time: 35 minutes     DOS: 08/20/2020  S/p: 1. Shoulder arthroscopic rotator cuff repair 15 x 10 mm, medium, double row (CPT 98401)   2. Shoulder arthroscopic biceps tenodesis (CPT 91946)  3. Shoulder arthroscopic subacromial decompression, bursectomy   4. Shoulder arthroscopic extensive debridement (anterior, posterior glenohumeral joint, subacromial space) (CPT 56863)  5. Shoulder arthroscopic microfracture (Crimson duvet technique) (CPT 50263)  6. Shoulder arthroscopic lysis of adhesions (CPT 22692)  7. Shoulder arthroscopic distal clavicle excision (CPT 47340)  8. Shoulder ORIF greater tuberosity (CPT 49160)  Post-Op Precautions: We will follow the arthroscopic rotator cuff repair guidelines for a medium size rotator cuff tear.  We discussed with the patient's family after surgery.  The patient will remain in a sling for 4 weeks.  PT to start at 6 weeks.        Precautions: Standard, diabetes       Subjective     Pt reports: he still has pain at night, is not using pillows as discussed but unable to say why. Also notes no pain with ADLs.     He was compliant with home exercise program.  Response to previous treatment: na  Functional change: na    Pain: 2/10  Location: R lateral  arm      Objective     Daily Measurements:   12/9/2020  Shoulder Passive Range of Motion:   Shoulder Right Left   Flexion   165 165   ER at 0   45 45   ER at 90   75 90   IR   To belly NT     Shoulder AROM Flexion: 155       Daily Treatment     Marcos received therapeutic exercises to develop ROM, flexibility and posture for 25 minutes including:  UBE completed for 5/5 min level 6 forward & backward to increase ROM, endurance and decrease pain to improve tolerance to ADLs and age related activities.   Pulleys 5m   Wall slides with 2# weight 4x10   Scaption 1# 4x8   Rows to focus on pec stretching gtb 3x15     Marcos received the following manual therapy techniques: Joint mobilizations and Soft tissue Mobilization were applied to the: R shoulder/elbow  for 10 minutes, including:  ROM check      Marcos participated in neuromuscular re-education activities to improve: Posture and motor control for 12 minutes. The following activities were included:  Shoulder flexion 1# 3x15   Shoulder Sidelying ABD 0# 3x15     To add next time:   Sidelying ER   Resisted IR     Marcos received hot pack for 10 minutes to R shoulder after treatment.    Home Exercises and Patient Education Provided     Education provided:   - cont with HEP    Written Home Exercises Provided: Patient instructed to cont prior HEP.  Exercises were reviewed and Marcos was able to demonstrate them prior to the end of the session.  Marcos demonstrated good  understanding of the education provided.     See EMR under patient instructions for exercises given.     Assessment   15 weeks 6 days s/p MD RTC repair     The patient was able to complete all therex with fatigue but no pain. Continue to discuss with the patient sleep adjustments to A with pain specifically adding pillows- the pt cont to demo poor understanding of the education. Overall, besides night time pain, has been progressing well.     Marcos is progressing well towards his goals.     Pt will continue to  benefit from skilled outpatient physical therapy to address the deficits listed in the problem list box on initial evaluation, provide pt/family education and to maximize pt's level of independence in the home and community environment. Pt prognosis is Good.     Pt's spiritual, cultural and educational needs considered and pt agreeable to plan of care and goals.    Anticipated barriers to physical therapy: None    Goals:  Short Term Goals: 6 weeks  1. Pt will be compliant with HEP 50% of prescribed amount. MET  2. The pt to demo improvement in R shoulder PROM to by 80% of the L MET  3.  The pt to demo tolerance to being out of the sling for 24 hours with pain <2/10 MET     Long Term Goals: 24 weeks (progressing not met)  1. Pt will be compliant with % of prescribed amount. MET  2. The pt to improvement in AROM of R shoulder within 80% of L shoulder to improve tolerance to OH movement progressing not met  3. The pt to  Demo at least 4+/5 of RTC muscle testing to demo improvement in tolerance to activity progressing not met   4. The pt to tolerate lifting 2# overhead to improve tolerance to ADLs and work related activities  progressing not met   5. The pt will report full participation in ADLs and IADLs without restrictions related to R Shoulder progressing not met     Plan     Follow Dr. Sherman's medium size RTC repair post op protocol    Brigitte Hopkins, PT , DPT, SCS, FAAOMPT

## 2020-12-21 ENCOUNTER — CLINICAL SUPPORT (OUTPATIENT)
Dept: REHABILITATION | Facility: HOSPITAL | Age: 70
End: 2020-12-21
Payer: OTHER MISCELLANEOUS

## 2020-12-21 DIAGNOSIS — M25.611 DECREASED RANGE OF MOTION OF RIGHT SHOULDER: ICD-10-CM

## 2020-12-21 DIAGNOSIS — M25.511 CHRONIC RIGHT SHOULDER PAIN: ICD-10-CM

## 2020-12-21 DIAGNOSIS — G89.29 CHRONIC RIGHT SHOULDER PAIN: ICD-10-CM

## 2020-12-21 PROCEDURE — 97112 NEUROMUSCULAR REEDUCATION: CPT

## 2020-12-21 PROCEDURE — 97110 THERAPEUTIC EXERCISES: CPT

## 2020-12-21 NOTE — PROGRESS NOTES
Physical Therapy Daily Treatment Note     Name: Marcos Barfield  [facility] Number: [id number]    Therapy Diagnosis:   Encounter Diagnoses   Name Primary?    Decreased range of motion of right shoulder     Chronic right shoulder pain      Physician: Mk Dennison MD    Visit Date: 12/21/2020  Physician Orders: PT Eval and Treat  Medical Diagnosis from [facility]  S46.011A (ICD-10-CM) - Traumatic tear of right rotator cuff, unspecified tear extent, initial encounter   M75.21 (ICD-10-CM) - Biceps tendinitis of right upper extremity         Evaluation Date: 9/18/2020  Authorization Period Expiration: 11/1/2020  Plan of Care Expiration: 12/31/2020  Visit # / Visits authorized: 3/ 12   Visit Total: 15    Time In: 0905  Time Out: 1000  Total Billable Time: 30 minutes     DOS: 08/20/2020  S/p: 1. Shoulder arthroscopic rotator cuff repair 15 x 10 mm, medium, double row (CPT 30419)   2. Shoulder arthroscopic biceps tenodesis (CPT 39530)  3. Shoulder arthroscopic subacromial decompression, bursectomy   4. Shoulder arthroscopic extensive debridement (anterior, posterior glenohumeral joint, subacromial space) (CPT 73342)  5. Shoulder arthroscopic microfracture (Crimson duvet technique) (CPT 54986)  6. Shoulder arthroscopic lysis of adhesions (CPT 56846)  7. Shoulder arthroscopic distal clavicle excision (CPT 60856)  8. Shoulder ORIF greater tuberosity (CPT 64936)  Post-Op Precautions: We will follow the arthroscopic rotator cuff repair guidelines for a medium size rotator cuff tear.  We discussed with the patient's family after surgery.  The patient will remain in a sling for 4 weeks.  PT to start at 6 weeks.        Precautions: Standard, diabetes       Subjective     Pt reports: his shoulder is doing better    He was compliant with home exercise program.  Response to previous treatment: na  Functional change: na    Pain: 2/10  Location: R lateral arm      Objective     Daily Measurements:   12/21/2020  Shoulder Passive Range of  Motion:   Shoulder Right Left   Flexion   165 165   ER at 0   45 45   ER at 90   75 90   IR   To belly NT     Shoulder AROM Flexion: 155 B     Shoulder AROM ER 45 R, 50 L     MMT:   Scaption R: 4/5, L 5/5   ER R 4/5 L 5/5   IR R 4/5 L 5/5      Daily Treatment     Marcos received therapeutic exercises to develop ROM, flexibility and posture for 25 minutes including:  UBE completed for 5/5 min level 6 forward & backward to increase ROM, endurance and decrease pain to improve tolerance to ADLs and age related activities.   Slot Machine 5# 40x     Wall slides with 2# weight 4x10     Scaption 2# 3x10    Marcos received the following manual therapy techniques: Joint mobilizations and Soft tissue Mobilization were applied to the: R shoulder/elbow  for 4 minutes, including:  ROM check      Marcos participated in neuromuscular re-education activities to improve: Posture and motor control for 14 minutes. The following activities were included:     Sidelying ER 1# 5x5   Resisted IR btb 3x15     Marcos received hot pack for 10 minutes to R shoulder after treatment.    Home Exercises and Patient Education Provided     Education provided:   - cont with HEP    Written Home Exercises Provided: Patient instructed to cont prior HEP.  Exercises were reviewed and Marcos was able to demonstrate them prior to the end of the session.  Marcos demonstrated good  understanding of the education provided.     See EMR under patient instructions for exercises given.     Assessment   17 weeks 4 days s/p MD RTC repair     The pt with sig improvement in his tolerance to exercises, noted fatigue with external rotation based movement but no pain reported. Cont to prog as van.     Marcos is progressing well towards his goals.     Pt will continue to benefit from skilled outpatient physical therapy to address the deficits listed in the problem list box on initial evaluation, provide pt/family education and to maximize pt's level of independence in the home  and community environment. Pt prognosis is Good.     Pt's spiritual, cultural and educational needs considered and pt agreeable to plan of care and goals.    Anticipated barriers to physical therapy: None    Goals:  Short Term Goals: 6 weeks  1. Pt will be compliant with HEP 50% of prescribed amount. MET  2. The pt to demo improvement in R shoulder PROM to by 80% of the L MET  3.  The pt to demo tolerance to being out of the sling for 24 hours with pain <2/10 MET     Long Term Goals: 24 weeks (progressing not met)  1. Pt will be compliant with % of prescribed amount. MET  2. The pt to improvement in AROM of R shoulder within 80% of L shoulder to improve tolerance to OH movement progressing not met  3. The pt to  Demo at least 4+/5 of RTC muscle testing to demo improvement in tolerance to activity progressing not met   4. The pt to tolerate lifting 2# overhead to improve tolerance to ADLs and work related activities  progressing not met   5. The pt will report full participation in ADLs and IADLs without restrictions related to R Shoulder progressing not met     Plan     Follow Dr. Sherman's medium size RTC repair post op protocol    Brigitte Hopkins, PT , DPT, SCS, FAAOMPT

## 2020-12-30 ENCOUNTER — CLINICAL SUPPORT (OUTPATIENT)
Dept: REHABILITATION | Facility: HOSPITAL | Age: 70
End: 2020-12-30
Payer: OTHER MISCELLANEOUS

## 2020-12-30 DIAGNOSIS — M25.511 CHRONIC RIGHT SHOULDER PAIN: ICD-10-CM

## 2020-12-30 DIAGNOSIS — G89.29 CHRONIC RIGHT SHOULDER PAIN: ICD-10-CM

## 2020-12-30 DIAGNOSIS — M25.611 DECREASED RANGE OF MOTION OF RIGHT SHOULDER: ICD-10-CM

## 2020-12-30 PROCEDURE — 97110 THERAPEUTIC EXERCISES: CPT

## 2020-12-30 PROCEDURE — 97112 NEUROMUSCULAR REEDUCATION: CPT

## 2020-12-30 NOTE — PROGRESS NOTES
Physical Therapy Daily Treatment Note     Name: Marcos Barfield  [facility] Number: [id number]    Therapy Diagnosis:   Encounter Diagnoses   Name Primary?    Decreased range of motion of right shoulder     Chronic right shoulder pain      Physician: Mk Dennison MD    Visit Date: 12/30/2020  Physician Orders: PT Eval and Treat  Medical Diagnosis from [facility]  S46.011A (ICD-10-CM) - Traumatic tear of right rotator cuff, unspecified tear extent, initial encounter   M75.21 (ICD-10-CM) - Biceps tendinitis of right upper extremity         Evaluation Date: 9/18/2020  Authorization Period Expiration: 11/1/2020  Plan of Care Expiration: 12/31/2020  Visit # / Visits authorized: 4/ 12   Visit Total: 16    Time In: 1430  Time Out: 1530  Total Billable Time: 60 minutes     DOS: 08/20/2020  S/p: 1. Shoulder arthroscopic rotator cuff repair 15 x 10 mm, medium, double row (CPT 53004)   2. Shoulder arthroscopic biceps tenodesis (CPT 61439)  3. Shoulder arthroscopic subacromial decompression, bursectomy   4. Shoulder arthroscopic extensive debridement (anterior, posterior glenohumeral joint, subacromial space) (CPT 83872)  5. Shoulder arthroscopic microfracture (Crimson duvet technique) (CPT 80616)  6. Shoulder arthroscopic lysis of adhesions (CPT 85120)  7. Shoulder arthroscopic distal clavicle excision (CPT 28701)  8. Shoulder ORIF greater tuberosity (CPT 36753)  Post-Op Precautions: We will follow the arthroscopic rotator cuff repair guidelines for a medium size rotator cuff tear.  We discussed with the patient's family after surgery.  The patient will remain in a sling for 4 weeks.  PT to start at 6 weeks.        Precautions: Standard, diabetes       Subjective     Pt reports: he has some night time pain again but his shoulder does feel better than it did pre-op    He was compliant with home exercise program.  Response to previous treatment: na  Functional change: na    Pain: 2/10  Location: R lateral arm      Objective      Daily Measurements:   12/21/2020  Shoulder Passive Range of Motion:   Shoulder Right Left   Flexion   165 165   ER at 0   45 45   ER at 90   75 90   IR   To belly NT     Shoulder AROM Flexion: 155 B     Shoulder AROM ER 45 R, 50 L     MMT:   Scaption R: 4/5, L 5/5   ER R 4+/5 L 5/5   IR R 4/5 L 5/5      Daily Treatment     Marcos received therapeutic exercises to develop ROM, flexibility and posture for 45 minutes including:  UBE completed for 5/5 min level 6 forward & backward to increase ROM, endurance and decrease pain to improve tolerance to ADLs and age related activities.   Thoracic Rotations sidelying 15x B   Table flexion stretch (mod dd) 10x10s holds   Bicep Curls 8# 40x    Marcos received the following manual therapy techniques: Joint mobilizations and Soft tissue Mobilization were applied to the: R shoulder/elbow  for 4 minutes, including:  ROM check      Marcos participated in neuromuscular re-education activities to improve: Posture and motor control for 11 minutes. The following activities were included:   Scaption 1# 4x10  No Money ytb 4x10   Marcos received hot pack for 10 minutes to R shoulder after treatment.    Home Exercises and Patient Education Provided     Education provided:   - cont with HEP    Written Home Exercises Provided: Patient instructed to cont prior HEP.  Exercises were reviewed and Marcos was able to demonstrate them prior to the end of the session.  Marcos demonstrated good  understanding of the education provided.     See EMR under patient instructions for exercises given.     Assessment   18 weeks 6 days s/p MD RTC repair     The pt demo'd improvement in scaption with less UT activation and reported fatigue following but no pain. Noted fatigue with bicep curls of heavier load but overall tolerated therex well.     Marcos is progressing well towards his goals.     Pt will continue to benefit from skilled outpatient physical therapy to address the deficits listed in the problem list  box on initial evaluation, provide pt/family education and to maximize pt's level of independence in the home and community environment. Pt prognosis is Good.     Pt's spiritual, cultural and educational needs considered and pt agreeable to plan of care and goals.    Anticipated barriers to physical therapy: None    Goals:  Short Term Goals: 6 weeks  1. Pt will be compliant with HEP 50% of prescribed amount. MET  2. The pt to demo improvement in R shoulder PROM to by 80% of the L MET  3.  The pt to demo tolerance to being out of the sling for 24 hours with pain <2/10 MET     Long Term Goals: 24 weeks (progressing not met)  1. Pt will be compliant with % of prescribed amount. MET  2. The pt to improvement in AROM of R shoulder within 80% of L shoulder to improve tolerance to OH movement progressing not met  3. The pt to  Demo at least 4+/5 of RTC muscle testing to demo improvement in tolerance to activity progressing not met   4. The pt to tolerate lifting 2# overhead to improve tolerance to ADLs and work related activities  progressing not met   5. The pt will report full participation in ADLs and IADLs without restrictions related to R Shoulder progressing not met     Plan     Follow Dr. Sherman's medium size RTC repair post op protocol    Brigitte Hopkins, PT , DPT, SCS, FAAOMPT

## 2021-01-04 ENCOUNTER — PATIENT MESSAGE (OUTPATIENT)
Dept: ADMINISTRATIVE | Facility: OTHER | Age: 71
End: 2021-01-04

## 2021-01-04 ENCOUNTER — CLINICAL SUPPORT (OUTPATIENT)
Dept: REHABILITATION | Facility: HOSPITAL | Age: 71
End: 2021-01-04
Payer: OTHER MISCELLANEOUS

## 2021-01-04 DIAGNOSIS — G89.29 CHRONIC RIGHT SHOULDER PAIN: ICD-10-CM

## 2021-01-04 DIAGNOSIS — M25.611 DECREASED RANGE OF MOTION OF RIGHT SHOULDER: ICD-10-CM

## 2021-01-04 DIAGNOSIS — M25.511 CHRONIC RIGHT SHOULDER PAIN: ICD-10-CM

## 2021-01-04 PROCEDURE — 97112 NEUROMUSCULAR REEDUCATION: CPT

## 2021-01-04 PROCEDURE — 97110 THERAPEUTIC EXERCISES: CPT

## 2021-01-14 ENCOUNTER — CLINICAL SUPPORT (OUTPATIENT)
Dept: REHABILITATION | Facility: HOSPITAL | Age: 71
End: 2021-01-14
Payer: OTHER MISCELLANEOUS

## 2021-01-14 DIAGNOSIS — M25.611 DECREASED RANGE OF MOTION OF RIGHT SHOULDER: ICD-10-CM

## 2021-01-14 DIAGNOSIS — G89.29 CHRONIC RIGHT SHOULDER PAIN: ICD-10-CM

## 2021-01-14 DIAGNOSIS — M25.511 CHRONIC RIGHT SHOULDER PAIN: ICD-10-CM

## 2021-01-14 PROCEDURE — 97112 NEUROMUSCULAR REEDUCATION: CPT

## 2021-01-14 PROCEDURE — 97110 THERAPEUTIC EXERCISES: CPT

## 2021-01-14 PROCEDURE — 97010 HOT OR COLD PACKS THERAPY: CPT

## 2021-01-22 ENCOUNTER — CLINICAL SUPPORT (OUTPATIENT)
Dept: REHABILITATION | Facility: HOSPITAL | Age: 71
End: 2021-01-22
Payer: OTHER MISCELLANEOUS

## 2021-01-22 DIAGNOSIS — M25.611 DECREASED RANGE OF MOTION OF RIGHT SHOULDER: ICD-10-CM

## 2021-01-22 DIAGNOSIS — G89.29 CHRONIC RIGHT SHOULDER PAIN: ICD-10-CM

## 2021-01-22 DIAGNOSIS — M25.511 CHRONIC RIGHT SHOULDER PAIN: ICD-10-CM

## 2021-01-22 PROCEDURE — 97110 THERAPEUTIC EXERCISES: CPT

## 2021-01-22 PROCEDURE — 97112 NEUROMUSCULAR REEDUCATION: CPT

## 2021-01-26 ENCOUNTER — CLINICAL SUPPORT (OUTPATIENT)
Dept: REHABILITATION | Facility: HOSPITAL | Age: 71
End: 2021-01-26
Payer: OTHER MISCELLANEOUS

## 2021-01-26 DIAGNOSIS — M25.611 DECREASED RANGE OF MOTION OF RIGHT SHOULDER: ICD-10-CM

## 2021-01-26 DIAGNOSIS — G89.29 CHRONIC RIGHT SHOULDER PAIN: ICD-10-CM

## 2021-01-26 DIAGNOSIS — M25.511 CHRONIC RIGHT SHOULDER PAIN: ICD-10-CM

## 2021-01-26 PROCEDURE — 97110 THERAPEUTIC EXERCISES: CPT

## 2021-01-26 PROCEDURE — 97530 THERAPEUTIC ACTIVITIES: CPT

## 2021-01-26 PROCEDURE — 97010 HOT OR COLD PACKS THERAPY: CPT

## 2021-02-03 ENCOUNTER — CLINICAL SUPPORT (OUTPATIENT)
Dept: REHABILITATION | Facility: HOSPITAL | Age: 71
End: 2021-02-03
Payer: OTHER MISCELLANEOUS

## 2021-02-03 DIAGNOSIS — M25.611 DECREASED RANGE OF MOTION OF RIGHT SHOULDER: ICD-10-CM

## 2021-02-03 DIAGNOSIS — M25.511 CHRONIC RIGHT SHOULDER PAIN: ICD-10-CM

## 2021-02-03 DIAGNOSIS — G89.29 CHRONIC RIGHT SHOULDER PAIN: ICD-10-CM

## 2021-02-03 PROCEDURE — 97112 NEUROMUSCULAR REEDUCATION: CPT

## 2021-02-03 PROCEDURE — 97110 THERAPEUTIC EXERCISES: CPT

## 2021-02-09 ENCOUNTER — CLINICAL SUPPORT (OUTPATIENT)
Dept: REHABILITATION | Facility: HOSPITAL | Age: 71
End: 2021-02-09
Payer: OTHER MISCELLANEOUS

## 2021-02-09 DIAGNOSIS — G89.29 CHRONIC RIGHT SHOULDER PAIN: ICD-10-CM

## 2021-02-09 DIAGNOSIS — M25.511 CHRONIC RIGHT SHOULDER PAIN: ICD-10-CM

## 2021-02-09 DIAGNOSIS — M25.611 DECREASED RANGE OF MOTION OF RIGHT SHOULDER: ICD-10-CM

## 2021-02-09 PROCEDURE — 97112 NEUROMUSCULAR REEDUCATION: CPT

## 2021-02-09 PROCEDURE — 97110 THERAPEUTIC EXERCISES: CPT

## 2021-02-18 ENCOUNTER — CLINICAL SUPPORT (OUTPATIENT)
Dept: REHABILITATION | Facility: HOSPITAL | Age: 71
End: 2021-02-18
Payer: OTHER MISCELLANEOUS

## 2021-02-18 DIAGNOSIS — G89.29 CHRONIC RIGHT SHOULDER PAIN: ICD-10-CM

## 2021-02-18 DIAGNOSIS — M25.511 CHRONIC RIGHT SHOULDER PAIN: ICD-10-CM

## 2021-02-18 DIAGNOSIS — M25.611 DECREASED RANGE OF MOTION OF RIGHT SHOULDER: ICD-10-CM

## 2021-02-18 PROCEDURE — 97112 NEUROMUSCULAR REEDUCATION: CPT

## 2021-02-18 PROCEDURE — 97110 THERAPEUTIC EXERCISES: CPT

## 2021-02-24 ENCOUNTER — OFFICE VISIT (OUTPATIENT)
Dept: SPORTS MEDICINE | Facility: CLINIC | Age: 71
End: 2021-02-24
Payer: OTHER MISCELLANEOUS

## 2021-02-24 VITALS
DIASTOLIC BLOOD PRESSURE: 82 MMHG | WEIGHT: 167 LBS | BODY MASS INDEX: 23.91 KG/M2 | SYSTOLIC BLOOD PRESSURE: 170 MMHG | HEART RATE: 72 BPM | HEIGHT: 70 IN

## 2021-02-24 DIAGNOSIS — Z98.890 S/P ROTATOR CUFF SURGERY: Primary | ICD-10-CM

## 2021-02-24 PROCEDURE — 99214 OFFICE O/P EST MOD 30 MIN: CPT | Mod: S$GLB,,, | Performed by: ORTHOPAEDIC SURGERY

## 2021-02-24 PROCEDURE — 99214 PR OFFICE/OUTPT VISIT, EST, LEVL IV, 30-39 MIN: ICD-10-PCS | Mod: S$GLB,,, | Performed by: ORTHOPAEDIC SURGERY

## 2021-02-24 PROCEDURE — 99999 PR PBB SHADOW E&M-EST. PATIENT-LVL III: CPT | Mod: PBBFAC,,, | Performed by: ORTHOPAEDIC SURGERY

## 2021-02-24 PROCEDURE — 99999 PR PBB SHADOW E&M-EST. PATIENT-LVL III: ICD-10-PCS | Mod: PBBFAC,,, | Performed by: ORTHOPAEDIC SURGERY

## 2021-04-16 ENCOUNTER — PATIENT MESSAGE (OUTPATIENT)
Dept: RESEARCH | Facility: HOSPITAL | Age: 71
End: 2021-04-16

## 2021-07-19 ENCOUNTER — TELEPHONE (OUTPATIENT)
Dept: SPORTS MEDICINE | Facility: CLINIC | Age: 71
End: 2021-07-19

## 2021-08-09 ENCOUNTER — OFFICE VISIT (OUTPATIENT)
Dept: SPORTS MEDICINE | Facility: CLINIC | Age: 71
End: 2021-08-09
Payer: OTHER MISCELLANEOUS

## 2021-08-09 VITALS
SYSTOLIC BLOOD PRESSURE: 176 MMHG | TEMPERATURE: 98 F | DIASTOLIC BLOOD PRESSURE: 85 MMHG | BODY MASS INDEX: 24.34 KG/M2 | HEART RATE: 65 BPM | HEIGHT: 70 IN | WEIGHT: 170 LBS

## 2021-08-09 DIAGNOSIS — Z98.890 S/P ROTATOR CUFF SURGERY: Primary | ICD-10-CM

## 2021-08-09 PROCEDURE — 99214 PR OFFICE/OUTPT VISIT, EST, LEVL IV, 30-39 MIN: ICD-10-PCS | Mod: S$GLB,,, | Performed by: ORTHOPAEDIC SURGERY

## 2021-08-09 PROCEDURE — 99999 PR PBB SHADOW E&M-EST. PATIENT-LVL III: CPT | Mod: PBBFAC,,, | Performed by: ORTHOPAEDIC SURGERY

## 2021-08-09 PROCEDURE — 99999 PR PBB SHADOW E&M-EST. PATIENT-LVL III: ICD-10-PCS | Mod: PBBFAC,,, | Performed by: ORTHOPAEDIC SURGERY

## 2021-08-09 PROCEDURE — 99214 OFFICE O/P EST MOD 30 MIN: CPT | Mod: S$GLB,,, | Performed by: ORTHOPAEDIC SURGERY

## 2021-08-09 RX ORDER — LISINOPRIL 10 MG/1
10 TABLET ORAL
COMMUNITY

## 2021-08-09 RX ORDER — INSULIN GLARGINE 100 [IU]/ML
25 INJECTION, SOLUTION SUBCUTANEOUS
COMMUNITY
End: 2021-08-09

## 2021-08-09 RX ORDER — INSULIN ASPART 100 [IU]/ML
INJECTION, SOLUTION INTRAVENOUS; SUBCUTANEOUS
COMMUNITY
Start: 2021-04-01

## 2021-08-09 RX ORDER — PRAVASTATIN SODIUM 80 MG/1
1 TABLET ORAL NIGHTLY
COMMUNITY
Start: 2021-04-01 | End: 2021-08-09

## 2021-08-09 RX ORDER — LANOLIN ALCOHOL/MO/W.PET/CERES
CREAM (GRAM) TOPICAL
COMMUNITY
Start: 2021-04-01

## 2021-08-09 RX ORDER — METFORMIN HYDROCHLORIDE 1000 MG/1
1000 TABLET, FILM COATED, EXTENDED RELEASE ORAL
COMMUNITY

## 2021-08-09 RX ORDER — LISINOPRIL 40 MG/1
TABLET ORAL
COMMUNITY
Start: 2021-04-01

## 2021-08-09 RX ORDER — SILDENAFIL 100 MG/1
TABLET, FILM COATED ORAL
COMMUNITY
Start: 2021-04-01

## 2021-11-17 NOTE — PROGRESS NOTES
Patient ID: Marcos Barfield is a 68 y.o. male.     Chief Complaint: Pain of the Right Shoulder     HPI  Marcos Barfield has right trapezius region pain pain.  The pain is located in the trapezius regionand the right side of the neck.  There is radiation to he posterior arm.  He denies any progression of weaknes. There is not instabilty. The pain is described as achy. The pain is aggravated by laying on it, turning his head to the right and increaesed activity, such as heavy lifting. He has not been shrimping as much as he has in the past. It is alleviated by rest. There is not associated back pain. His history, medications and problem list were reviewed.          Past Medical History:   Diagnosis Date    Anemia       kid       No past surgical history on file.  No family history on file.      Social History                   Social History    Marital status:        Spouse name: N/A    Number of children: N/A    Years of education: N/A            Occupational History    Not on file.              Social History Main Topics    Smoking status: Never Smoker    Smokeless tobacco: Never Used    Alcohol use No    Drug use: No    Sexual activity: Not on file              Other Topics Concern    Not on file            Social History Narrative    No narrative on file                      Current Outpatient Prescriptions on File Prior to Visit   Medication Sig Dispense Refill    aspirin (ECOTRIN) 81 MG EC tablet Take 81 mg by mouth once daily.          fluticasone (FLONASE) 50 mcg/actuation nasal spray     0    insulin glargine (LANTUS) 100 unit/mL injection Inject 32 Units into the skin every evening.          lisinopril (PRINIVIL,ZESTRIL) 20 MG tablet Take 20 mg by mouth once daily.        metformin (GLUCOPHAGE) 1000 MG tablet Take 1,000 mg by mouth 2 (two) times daily with meals.          meclizine (ANTIVERT) 25 mg tablet     3    simvastatin (ZOCOR) 80 MG tablet Take 80 mg by mouth every evening.             No current facility-administered medications on file prior to visit.                 Review of patient's allergies indicates:   Allergen Reactions    Meloxicam Other (See Comments)       Drove blood pressure james high per pt         Review of Systems   Constitution: Negative for chills, fever and night sweats.   HENT: Negative for headaches and hearing loss.    Eyes: Negative for blurred vision and double vision.   Cardiovascular: Negative for chest pain, claudication and leg swelling.   Respiratory: Negative for shortness of breath.    Endocrine: Negative for polydipsia, polyphagia and polyuria.   Hematologic/Lymphatic: Negative for adenopathy and bleeding problem. Does not bruise/bleed easily.   Skin: Negative for poor wound healing.   Musculoskeletal: Positive for joint pain.   Gastrointestinal: Negative for diarrhea and heartburn.   Genitourinary: Negative for bladder incontinence.   Neurological: Negative for focal weakness, numbness, paresthesias and sensory change.   Psychiatric/Behavioral: The patient is not nervous/anxious.    Allergic/Immunologic: Negative for persistent infections.         Objective:   Body mass index is 24.83 kg/m².             General     Constitutional: He is oriented to person, place, and time. He appears well-developed and well-nourished.   HENT:   Head: Normocephalic and atraumatic.   Eyes: EOM are normal.   Cardiovascular: Normal rate.    Pulmonary/Chest: Effort normal.   Neurological: He is alert and oriented to person, place, and time.   Psychiatric: He has a normal mood and affect. His behavior is normal.                  Back (L-Spine & T-Spine) / Neck (C-Spine) Exam      Tenderness   The patient is tender to palpation of the right trapezial (spasm).   The patient is experiencing no tenderness in the left trapezial region.      Neck (C-Spine) Range of Motion   Flexion: Mild and limited  Extension: Mild and limited  Right Lateral Bend: abnormal  Left Lateral Bend:  EP ATTENDING    tele: atypical -130s since yesterday    denies palpitations, syncope, nor angina, ROS otherwise -        DATE OF SERVICE - 11-17-21     Review of Systems:   Constitutional: [ ] fevers, [ ] chills.   Skin: [ ] dry skin. [ ] rashes.  Psychiatric: [ ] depression, [ ] anxiety.   Gastrointestinal: [ ] BRBPR, [ ] melena.   Neurological: [ ] confusion. [ ] seizures. [ ] shuffling gait.   Ears,Nose,Mouth and Throat: [ ] ear pain [ ] sore throat.   Eyes: [ ] diplopia.   Respiratory: [ ] hemoptysis. [ ] shortness of breath  Cardiovascular: See HPI above  Hematologic/Lymphatic: [ ] anemia. [ ] painful nodes. [ ] prolonged bleeding.   Genitourinary: [ ] hematuria. [ ] flank pain.   Endocrine: [ ] significant change in weight. [ ] intolerance to heat and cold.     Review of systems [ x] otherwise negative, [ ] otherwise unable to obtain    FH: no family history of sudden cardiac death in first degree relatives    SH: [ ] tobacco, [ ] alcohol, [ ] drugs    acetaminophen     Tablet .. 650 milliGRAM(s) Oral every 6 hours PRN  aMIOdarone    Tablet 400 milliGRAM(s) Oral every 8 hours  apixaban 2.5 milliGRAM(s) Oral two times a day  aspirin enteric coated 81 milliGRAM(s) Oral daily  bisacodyl Suppository 10 milliGRAM(s) Rectal once  chlorhexidine 2% Cloths 1 Application(s) Topical daily  furosemide    Tablet 40 milliGRAM(s) Oral daily  metoprolol tartrate 25 milliGRAM(s) Oral every 6 hours  mirtazapine 45 milliGRAM(s) Oral at bedtime  pantoprazole    Tablet 40 milliGRAM(s) Oral before breakfast  polyethylene glycol 3350 17 Gram(s) Oral daily  senna 2 Tablet(s) Oral at bedtime  spironolactone 25 milliGRAM(s) Oral daily                            12.6   15.26 )-----------( 226      ( 17 Nov 2021 05:08 )             39.1       11-17    133<L>  |  95<L>  |  31<H>  ----------------------------<  109<H>  4.1   |  19<L>  |  1.01    Ca    9.5      17 Nov 2021 05:08  Phos  3.2     11-17  Mg     2.4     11-17              T(C): 36.5 (11-17-21 @ 07:24), Max: 36.7 (11-16-21 @ 15:07)  HR: 128 (11-17-21 @ 08:29) (111 - 133)  BP: 105/57 (11-17-21 @ 08:29) (95/52 - 126/71)  RR: 16 (11-17-21 @ 07:24) (16 - 18)  SpO2: 97% (11-17-21 @ 07:24) (93% - 97%)  Wt(kg): --    I&O's Summary    16 Nov 2021 07:01  -  17 Nov 2021 07:00  --------------------------------------------------------  IN: 450 mL / OUT: 700 mL / NET: -250 mL        General: Well nourished in no acute distress. Alert and Oriented * 3.   Head: Normocephalic and atraumatic.   Neck: No JVD. No bruits. Supple. Does not appear to be enlarged.   Cardiovascular: + S1,S2 ; tachy, IRR Soft systolic murmur at the left lower sternal border. No rubs noted.    Lungs: CTA b/l. No rhonchi, rales or wheezes.   Abdomen: + BS, soft. Non tender. Non distended. No rebound. No guarding.   Extremities: No clubbing/cyanosis/edema.   Neurologic: Moves all four extremities. Full range of motion.   Skin: Warm and moist. The patient's skin has normal elasticity and good skin turgor.   Psychiatric: Appropriate mood and affect.  Musculoskeletal: Normal range of motion, normal strength    LVEF normal    A/P) 72 y/o female PMH hypertension, hyperlipidemia a/w symptomatic severe MR and new onset AF. She is now s/p bio-MVR, MAZE and LISBET ligation on 11/8. She was doing well in NSR until yesterday when she went back into rapid atypical AFL.    -increase eliquis to 5mg bid  -continue amio reload (day 2/5)  -continue metoprolol at current doses  -if her AFL persists after amio reload then next step would be DCCV prior to hospital discharge (Friday?)  -make NPO after midnight Thursday night in case she requires DCCV Friday morning  -no obvious indication for pacing for SSS at this time  -f/u with her cardiologist Dr Solo Ocampo after discharge      Art Walker M.D., [facility]  Cardiac Electrophysiology  [facility]  [address] [place] [address], [address] [place] [address]  [email or web address]    office [phone]  pager [phone]       normal  Right Rotation: abnormal  Left Rotation: normal     Other He has no scoliosis .  Spinal Kyphosis: Absent  Spinal Lordosis: Absent  Head Tilt: Negative     Muscle Strength   Right Upper Extremity    Biceps: 5/5/5    Deltoid: 5/5  Triceps: 5/5  Wrist Extension: 5/5/5    Wrist Flexion: 5/5/5    Finger Flexors: 5/5  Finger Extensors: 5/5  Left Upper Extremity  Biceps: 5/5/5    Deltoid: 5/5  Triceps: 5/5  Wrist Extension: 5/5/5    Wrist Flexion: 5/5/5    Finger Flexors: 5/5  Finger Extensors: 5/5     Reflexes      Left Side  Biceps: 2+  Triceps: 2+  Brachioradialis: 2+  Left Woodard's Sign: Absent     Right Side    Biceps: 2+  Triceps: 2+  Brachioradialis: 2+  Right Woodard's Sign: absent     C-Spine xrays were reviewed. Chronic degenerative changes C5-6, C 6-7.  No significant change compared to the last two years                 Assessment:              Encounter Diagnoses   Name Primary?    Cervical pain (neck) Yes    Cervical spondylolysis           Plan:      Marcos was seen today for pain.     Diagnoses and all orders for this visit:     Cervical pain (neck)     Cervical spondylolysis        Options discussed.  Pain has worsened.  He can't take Meloxicam due to side effects with blood pressure.    Will get MRI

## 2023-03-17 ENCOUNTER — TELEPHONE (OUTPATIENT)
Dept: SPORTS MEDICINE | Facility: CLINIC | Age: 73
End: 2023-03-17
Payer: OTHER MISCELLANEOUS

## 2023-03-24 ENCOUNTER — TELEPHONE (OUTPATIENT)
Dept: SPORTS MEDICINE | Facility: CLINIC | Age: 73
End: 2023-03-24
Payer: OTHER MISCELLANEOUS

## 2023-03-24 NOTE — TELEPHONE ENCOUNTER
Spoke with patient regarding rescheduling of appointment.  Successfully rescheduled and answered any questions patient had.

## 2023-03-29 ENCOUNTER — OFFICE VISIT (OUTPATIENT)
Dept: SPORTS MEDICINE | Facility: CLINIC | Age: 73
End: 2023-03-29
Payer: COMMERCIAL

## 2023-03-29 ENCOUNTER — HOSPITAL ENCOUNTER (OUTPATIENT)
Dept: RADIOLOGY | Facility: HOSPITAL | Age: 73
Discharge: HOME | End: 2023-03-29
Attending: ORTHOPAEDIC SURGERY
Payer: COMMERCIAL

## 2023-03-29 VITALS
WEIGHT: 171 LBS | HEIGHT: 70 IN | DIASTOLIC BLOOD PRESSURE: 81 MMHG | BODY MASS INDEX: 24.48 KG/M2 | HEART RATE: 75 BPM | SYSTOLIC BLOOD PRESSURE: 173 MMHG

## 2023-03-29 DIAGNOSIS — S46.811A TRAPEZIUS STRAIN, RIGHT, INITIAL ENCOUNTER: ICD-10-CM

## 2023-03-29 DIAGNOSIS — M25.511 RIGHT SHOULDER PAIN, UNSPECIFIED CHRONICITY: ICD-10-CM

## 2023-03-29 DIAGNOSIS — M25.511 ACUTE PAIN OF RIGHT SHOULDER: Primary | ICD-10-CM

## 2023-03-29 PROCEDURE — 99999 PR PBB SHADOW E&M-EST. PATIENT-LVL IV: CPT | Mod: PBBFAC,,, | Performed by: ORTHOPAEDIC SURGERY

## 2023-03-29 PROCEDURE — 99999 PR PBB SHADOW E&M-EST. PATIENT-LVL IV: ICD-10-PCS | Mod: PBBFAC,,, | Performed by: ORTHOPAEDIC SURGERY

## 2023-03-29 PROCEDURE — 1125F AMNT PAIN NOTED PAIN PRSNT: CPT | Mod: CPTII,S$GLB,, | Performed by: ORTHOPAEDIC SURGERY

## 2023-03-29 PROCEDURE — 73030 X-RAY EXAM OF SHOULDER: CPT | Mod: TC,RT

## 2023-03-29 PROCEDURE — 1101F PR PT FALLS ASSESS DOC 0-1 FALLS W/OUT INJ PAST YR: ICD-10-PCS | Mod: CPTII,S$GLB,, | Performed by: ORTHOPAEDIC SURGERY

## 2023-03-29 PROCEDURE — 3288F FALL RISK ASSESSMENT DOCD: CPT | Mod: CPTII,S$GLB,, | Performed by: ORTHOPAEDIC SURGERY

## 2023-03-29 PROCEDURE — 1159F MED LIST DOCD IN RCRD: CPT | Mod: CPTII,S$GLB,, | Performed by: ORTHOPAEDIC SURGERY

## 2023-03-29 PROCEDURE — 3077F SYST BP >= 140 MM HG: CPT | Mod: CPTII,S$GLB,, | Performed by: ORTHOPAEDIC SURGERY

## 2023-03-29 PROCEDURE — 3079F DIAST BP 80-89 MM HG: CPT | Mod: CPTII,S$GLB,, | Performed by: ORTHOPAEDIC SURGERY

## 2023-03-29 PROCEDURE — 1101F PT FALLS ASSESS-DOCD LE1/YR: CPT | Mod: CPTII,S$GLB,, | Performed by: ORTHOPAEDIC SURGERY

## 2023-03-29 PROCEDURE — 1125F PR PAIN SEVERITY QUANTIFIED, PAIN PRESENT: ICD-10-PCS | Mod: CPTII,S$GLB,, | Performed by: ORTHOPAEDIC SURGERY

## 2023-03-29 PROCEDURE — 73030 X-RAY EXAM OF SHOULDER: CPT | Mod: 26,RT,, | Performed by: RADIOLOGY

## 2023-03-29 PROCEDURE — 1159F PR MEDICATION LIST DOCUMENTED IN MEDICAL RECORD: ICD-10-PCS | Mod: CPTII,S$GLB,, | Performed by: ORTHOPAEDIC SURGERY

## 2023-03-29 PROCEDURE — 3288F PR FALLS RISK ASSESSMENT DOCUMENTED: ICD-10-PCS | Mod: CPTII,S$GLB,, | Performed by: ORTHOPAEDIC SURGERY

## 2023-03-29 PROCEDURE — 3079F PR MOST RECENT DIASTOLIC BLOOD PRESSURE 80-89 MM HG: ICD-10-PCS | Mod: CPTII,S$GLB,, | Performed by: ORTHOPAEDIC SURGERY

## 2023-03-29 PROCEDURE — 99214 OFFICE O/P EST MOD 30 MIN: CPT | Mod: S$GLB,,, | Performed by: ORTHOPAEDIC SURGERY

## 2023-03-29 PROCEDURE — 73030 XR SHOULDER COMPLETE 2 OR MORE VIEWS RIGHT: ICD-10-PCS | Mod: 26,RT,, | Performed by: RADIOLOGY

## 2023-03-29 PROCEDURE — 3077F PR MOST RECENT SYSTOLIC BLOOD PRESSURE >= 140 MM HG: ICD-10-PCS | Mod: CPTII,S$GLB,, | Performed by: ORTHOPAEDIC SURGERY

## 2023-03-29 PROCEDURE — 99214 PR OFFICE/OUTPT VISIT, EST, LEVL IV, 30-39 MIN: ICD-10-PCS | Mod: S$GLB,,, | Performed by: ORTHOPAEDIC SURGERY
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
## 2023-04-13 ENCOUNTER — CLINICAL SUPPORT (OUTPATIENT)
Dept: REHABILITATION | Facility: HOSPITAL | Age: 73
End: 2023-04-13
Attending: ORTHOPAEDIC SURGERY
Payer: MEDICARE

## 2023-04-13 DIAGNOSIS — M25.511 ACUTE PAIN OF RIGHT SHOULDER: ICD-10-CM

## 2023-04-13 PROCEDURE — 97161 PT EVAL LOW COMPLEX 20 MIN: CPT

## 2023-04-13 PROCEDURE — 97140 MANUAL THERAPY 1/> REGIONS: CPT

## 2023-04-13 PROCEDURE — 97110 THERAPEUTIC EXERCISES: CPT
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
## 2023-04-17 ENCOUNTER — CLINICAL SUPPORT (OUTPATIENT)
Dept: REHABILITATION | Facility: HOSPITAL | Age: 73
End: 2023-04-17
Attending: ORTHOPAEDIC SURGERY
Payer: MEDICARE

## 2023-04-17 DIAGNOSIS — M25.511 ACUTE PAIN OF RIGHT SHOULDER: Primary | ICD-10-CM

## 2023-04-17 PROCEDURE — 97112 NEUROMUSCULAR REEDUCATION: CPT | Mod: CQ

## 2023-04-17 NOTE — PROGRESS NOTES
Physical Therapy Daily Treatment Note     Name: Marcos Barfield  [facility] Number: [id number]    Therapy Diagnosis: No diagnosis found.  Physician: Kayleen Sherman MD    Visit Date: 4/17/2023  Physician Orders: PT Eval and Treat  Medical Diagnosis from Referral: M25.511 (ICD-10-CM) - Acute pain of right shoulder  Evaluation Date: 4/13/2023  Authorization Period Expiration: 3/28/2024  Plan of Care Expiration: 6/14/2023  Progress Note Due: 5/14/2023  FOTO: 1/1  Visit #/Visits authorized: 1/ 20     Time In: 0900  Time Out: 1000  Total Billable Time: 30 minutes    Precautions: Standard    Subjective     Pt reports: have not been able to perform HEP everyday 2* taking car of mom. HEP does help  He was compliant with home exercise program.  Response to previous treatment: sore after eval  Functional change: none    Pain: 5/10  Location: right shoulder      Objective     Marcos received therapeutic exercises to develop strength, endurance, ROM, and posture for 25 minutes including:  UBE 5/5 lvl 3  Pulleys FL x 5 MIN  Thoracic rotation B 20x (forearm to cheek)   SNAG Cervical rotation *cueing 15x    Marcos participated in neuromuscular re-education activities to improve: Kinesthetic, Proprioception, and Posture for 25 minutes. The following activities were included:  Resisted IR Gtb 3x10  Resisted ER otb   Scaption unloaded seated with YTB 5x5    Marcos participated in dynamic functional therapeutic activities to improve functional performance for 00  minutes, including:        received the following manual therapy techniques: Joint mobilizations were applied to the: R shoulder and C-T/S for 10 min minutes, including:  Supine CTJUNX distraction mobs grade III   Post/inf GHJ mobs grade III-IV R          Home Exercises Provided and Patient Education Provided     Education provided:   - cont HEP    Written Home Exercises Provided: Patient instructed to cont prior HEP.  Exercises were reviewed and Marcos was able to demonstrate them prior to  the end of the session.  Marcos demonstrated good  understanding of the education provided.     See EMR under Patient Instructions for exercises provided prior visit.    Assessment     Marcos tolerated all interventions well with adverse reactions. VC needed w/ scapiton intervention to activate LT vs using UT to perform exercise.VC needed for postural awareness during seated exercise. Decreased inferior and posterior GH joint mobility.   Marcos Is progressing well towards his goals.   Pt prognosis is Good.     Pt will continue to benefit from skilled outpatient physical therapy to address the deficits listed in the problem list box on initial evaluation, provide pt/family education and to maximize pt's level of independence in the home and community environment.     Pt's spiritual, cultural and educational needs considered and pt agreeable to plan of care and goals.    Anticipated barriers to physical therapy: none    Goals: Short Term Goals: 2-4 weeks  1. Pt will be compliant with HEP 50% of prescribed amount.   2. The pt to demo improvement in shoulder AROM to be pain free   3.  The patient to demo improvement in MMT by 1/3 deg to demo improvement in neuroms control and pain free MMT      Long Term Goals: 8-10 weeks   Pt will be compliant with % of prescribed amount.   The patient to demo tolerance to looking over the shoulder R & L to allow for cervical motion normalcy during driving   The patient to demo tolerance to reaching 5# overhead with minimal pain   The pt will report full participation in ADLs and IADLs without restrictions related to R shoulder and neck .     Plan     Cont w/ POC focusing on shoulder and perscapular strengthening as well as decrease cervical guarding.    Cierra Tomas, PTA

## 2023-04-20 ENCOUNTER — CLINICAL SUPPORT (OUTPATIENT)
Dept: REHABILITATION | Facility: HOSPITAL | Age: 73
End: 2023-04-20
Attending: ORTHOPAEDIC SURGERY
Payer: MEDICARE

## 2023-04-20 DIAGNOSIS — M25.511 ACUTE PAIN OF RIGHT SHOULDER: Primary | ICD-10-CM

## 2023-04-20 DIAGNOSIS — M25.611 DECREASED RANGE OF MOTION OF RIGHT SHOULDER: ICD-10-CM

## 2023-04-20 PROCEDURE — 97112 NEUROMUSCULAR REEDUCATION: CPT

## 2023-04-20 NOTE — PROGRESS NOTES
Physical Therapy Daily Treatment Note     Name: Marcos Barfield  Clinic Number: [id number]    Therapy Diagnosis: No diagnosis found.  Physician: Kayleen Sherman MD    Visit Date: 4/20/2023  Physician Orders: PT Eval and Treat  Medical Diagnosis from Referral: M25.511 (ICD-10-CM) - Acute pain of right shoulder  Evaluation Date: 4/13/2023  Authorization Period Expiration: 3/28/2024  Plan of Care Expiration: 6/14/2023  Progress Note Due: 5/14/2023  FOTO: 1/1  Visit #/Visits authorized: 2/ 20     Time In: 0800  Time Out: 0900  Total Billable Time: 30 minutes    Precautions: Standard    Subjective     Pt reports: he has been feeling better   He was compliant with home exercise program.  Response to previous treatment: sore after eval  Functional change: none    Pain: 5/10  Location: right shoulder      Objective     Marcos received therapeutic exercises to develop strength, endurance, ROM, and posture for 10 minutes including  Pulleys FL x 5 MIN  Thoracic rotation B 20x (forearm to cheek)       Marcos participated in neuromuscular re-education activities to improve: Kinesthetic, Proprioception, and Posture for 28 minutes. The following activities were included:  Resisted IR Gtb 3x10  Resisted ER otb   Scaption unloaded seated with GTB 4x8 B   SNAG Cervical rotation *cueing 15x    Marcos participated in dynamic functional therapeutic activities to improve functional performance for 00  minutes, including:        received the following manual therapy techniques: Joint mobilizations were applied to the: R shoulder and C-T/S for 08 min minutes, including:  Supine CTJUNX distraction mobs grade III   Post/inf GHJ mobs grade III-IV R      Received hot pack for 10 minutes to neck.     Home Exercises Provided and Patient Education Provided     Education provided:   - cont HEP    Written Home Exercises Provided: Patient instructed to cont prior HEP.  Exercises were reviewed and Marcos was able to demonstrate them prior to the end of the  session.  Marcos demonstrated good  understanding of the education provided.     See EMR under Patient Instructions for exercises provided prior visit.    Assessment     The patient was able to complete all therex well with improvement in shoulder AROM and cervical pain following. Will cont to address strength and mobility deficits to improve outcomes.   Marcos Is progressing well towards his goals.   Pt prognosis is Good.     Pt will continue to benefit from skilled outpatient physical therapy to address the deficits listed in the problem list box on initial evaluation, provide pt/family education and to maximize pt's level of independence in the home and community environment.     Pt's spiritual, cultural and educational needs considered and pt agreeable to plan of care and goals.    Anticipated barriers to physical therapy: none    Goals: Short Term Goals: 2-4 weeks  1. Pt will be compliant with HEP 50% of prescribed amount.   2. The pt to demo improvement in shoulder AROM to be pain free   3.  The patient to demo improvement in MMT by 1/3 deg to demo improvement in neuroms control and pain free MMT      Long Term Goals: 8-10 weeks   Pt will be compliant with % of prescribed amount.   The patient to demo tolerance to looking over the shoulder R & L to allow for cervical motion normalcy during driving   The patient to demo tolerance to reaching 5# overhead with minimal pain   The pt will report full participation in ADLs and IADLs without restrictions related to R shoulder and neck .     Plan     Cont w/ POC focusing on shoulder and perscapular strengthening as well as decrease cervical guarding.    Brigitte Hopkins, PT

## 2023-04-24 ENCOUNTER — CLINICAL SUPPORT (OUTPATIENT)
Dept: REHABILITATION | Facility: HOSPITAL | Age: 73
End: 2023-04-24
Attending: ORTHOPAEDIC SURGERY
Payer: MEDICARE

## 2023-04-24 DIAGNOSIS — M25.511 ACUTE PAIN OF RIGHT SHOULDER: Primary | ICD-10-CM

## 2023-04-24 PROCEDURE — 97110 THERAPEUTIC EXERCISES: CPT | Mod: CQ

## 2023-04-24 PROCEDURE — 97112 NEUROMUSCULAR REEDUCATION: CPT | Mod: CQ

## 2023-04-24 NOTE — PROGRESS NOTES
Physical Therapy Daily Treatment Note     Name: Marcos Barfield  Clinic Number: [id number]    Therapy Diagnosis: No diagnosis found.  Physician: Kayleen Sherman MD    Visit Date: 4/24/2023  Physician Orders: PT Eval and Treat  Medical Diagnosis from Referral: M25.511 (ICD-10-CM) - Acute pain of right shoulder  Evaluation Date: 4/13/2023  Authorization Period Expiration: 3/28/2024  Plan of Care Expiration: 6/14/2023  Progress Note Due: 5/14/2023  FOTO: 1/1  Visit #/Visits authorized: 3/ 20     Time In: 0958  Time Out: 10:50  Total Billable Time: 52 minutes    Precautions: Standard    Subjective     Pt reports: Have not been able to perform HEP over the weekend 2* getting house ready for mother's return  He was compliant with home exercise program.  Response to previous treatment: sore after eval  Functional change: none    Pain: 5/10  Location: right shoulder      Objective     Marcos received therapeutic exercises to develop strength, endurance, ROM, and posture for 20 minutes including  UBE 5/5 lvl 3  Pulleys FL x 5 MIN  Thoracic rotation B 20x (forearm to cheek)       Marcos participated in neuromuscular re-education activities to improve: Kinesthetic, Proprioception, and Posture for 24 minutes. The following activities were included:  Resisted IR Gtb 3x10  Resisted ER otb   Scaption unloaded seated with GTB 3x10 B   SNAG Cervical rotation *cueing 15x    Marcos participated in dynamic functional therapeutic activities to improve functional performance for 00  minutes, including:        received the following manual therapy techniques: Joint mobilizations were applied to the: R shoulder and C-T/S for 08 min minutes, including:  Supine CTJUNX distraction mobs grade III   Post/inf GHJ mobs grade III-IV R      Received hot pack for 00 minutes to neck.     Home Exercises Provided and Patient Education Provided     Education provided:   - cont HEP    Written Home Exercises Provided: Patient instructed to cont prior HEP.  Exercises  were reviewed and Marcos was able to demonstrate them prior to the end of the session.  Marcos demonstrated good  understanding of the education provided.     See EMR under Patient Instructions for exercises provided prior visit.    Assessment     Cervical rotation is Improving with SNAGs with decrease T/S involvement. ER against resistance is more challenging. Cont need for VC for posture during upright exercises.  Marcos Is progressing well towards his goals.   Pt prognosis is Good.     Pt will continue to benefit from skilled outpatient physical therapy to address the deficits listed in the problem list box on initial evaluation, provide pt/family education and to maximize pt's level of independence in the home and community environment.     Pt's spiritual, cultural and educational needs considered and pt agreeable to plan of care and goals.    Anticipated barriers to physical therapy: none    Goals: Short Term Goals: 2-4 weeks  1. Pt will be compliant with HEP 50% of prescribed amount.   2. The pt to demo improvement in shoulder AROM to be pain free   3.  The patient to demo improvement in MMT by 1/3 deg to demo improvement in neuroms control and pain free MMT      Long Term Goals: 8-10 weeks   Pt will be compliant with % of prescribed amount.   The patient to demo tolerance to looking over the shoulder R & L to allow for cervical motion normalcy during driving   The patient to demo tolerance to reaching 5# overhead with minimal pain   The pt will report full participation in ADLs and IADLs without restrictions related to R shoulder and neck .     Plan     Cont w/ POC focusing on shoulder and perscapular strengthening as well as decrease cervical guarding.    Cierra Tomas, PTA

## 2023-04-26 ENCOUNTER — CLINICAL SUPPORT (OUTPATIENT)
Dept: REHABILITATION | Facility: HOSPITAL | Age: 73
End: 2023-04-26
Attending: ORTHOPAEDIC SURGERY
Payer: MEDICARE

## 2023-04-26 DIAGNOSIS — M25.511 ACUTE PAIN OF RIGHT SHOULDER: Primary | ICD-10-CM

## 2023-04-26 PROCEDURE — 97140 MANUAL THERAPY 1/> REGIONS: CPT | Mod: CQ

## 2023-04-26 PROCEDURE — 97110 THERAPEUTIC EXERCISES: CPT | Mod: CQ

## 2023-04-26 PROCEDURE — 97112 NEUROMUSCULAR REEDUCATION: CPT | Mod: CQ

## 2023-04-26 NOTE — PROGRESS NOTES
Physical Therapy Daily Treatment Note     Name: Marcos Barfield  [facility] Number: [id number]    Therapy Diagnosis: No diagnosis found.  Physician: Kayleen Sherman MD    Visit Date: 4/26/2023  Physician Orders: PT Eval and Treat  Medical Diagnosis from Referral: M25.511 (ICD-10-CM) - Acute pain of right shoulder  Evaluation Date: 4/13/2023  Authorization Period Expiration: 3/28/2024  Plan of Care Expiration: 6/14/2023  Progress Note Due: 5/14/2023  FOTO: 1/1  Visit #/Visits authorized: 4/ 20     Time In: 0800  Time Out: 0840  Total Billable Time: 40 minutes    Precautions: Standard    Subjective     Pt reports: Cut my L forearm with box cutting knife. Have stitches and is a little swollen  He was compliant with home exercise program.  Response to previous treatment: sore after eval  Functional change: none    Pain: 5/10  Location: right shoulder      Objective     Marcos received therapeutic exercises to develop strength, endurance, ROM, and posture for 10 minutes including  UBE 5/5 lvl 3-NT   Pulleys FL x 5 MIN-NP  Thoracic rotation B 20x (forearm to cheek)   CT self mob sit back EOT     Marcos participated in neuromuscular re-education activities to improve: Kinesthetic, Proprioception, and Posture for 22 minutes. The following activities were included:  Resisted IR Gtb 3x10  Resisted ER otb   Scaption unloaded seated with GTB 3x10 B-NP  SNAG Cervical rotation *cueing 15x  V wall slides 4x10  SA punch outs with FL OTB 3x10    Marcos participated in dynamic functional therapeutic activities to improve functional performance for 00  minutes, including:        received the following manual therapy techniques: Joint mobilizations were applied to the: R shoulder and C-T/S for 08 min minutes, including:  Supine CTJUNX distraction mobs grade III   Post/inf GHJ mobs grade III-IV R      Received hot pack for 00 minutes to neck.     Home Exercises Provided and Patient Education Provided     Education provided:   - cont HEP    Written Home  Exercises Provided: Patient instructed to cont prior HEP.  Exercises were reviewed and Mracos was able to demonstrate them prior to the end of the session.  Marcos demonstrated good  understanding of the education provided.     See EMR under Patient Instructions for exercises provided prior visit.    Assessment     Session modified today 2* to recent L UE incident. Worked on improving CT mobility w/ sit backs today. Good upward scapular rot with V wall slides after verbal and tactile cueing. Issued pt compression sleeve for L forearm/hand to help with swelling. Pt stated that L thumb felt less numb after session.   Marcos Is progressing well towards his goals.   Pt prognosis is Good.     Pt will continue to benefit from skilled outpatient physical therapy to address the deficits listed in the problem list box on initial evaluation, provide pt/family education and to maximize pt's level of independence in the home and community environment.     Pt's spiritual, cultural and educational needs considered and pt agreeable to plan of care and goals.    Anticipated barriers to physical therapy: none    Goals: Short Term Goals: 2-4 weeks  1. Pt will be compliant with HEP 50% of prescribed amount.   2. The pt to demo improvement in shoulder AROM to be pain free   3.  The patient to demo improvement in MMT by 1/3 deg to demo improvement in neuroms control and pain free MMT      Long Term Goals: 8-10 weeks   Pt will be compliant with % of prescribed amount.   The patient to demo tolerance to looking over the shoulder R & L to allow for cervical motion normalcy during driving   The patient to demo tolerance to reaching 5# overhead with minimal pain   The pt will report full participation in ADLs and IADLs without restrictions related to R shoulder and neck .     Plan     Cont w/ POC focusing on shoulder and perscapular strengthening as well as decrease cervical guarding.    Cierra Tomas, PTA

## 2023-05-01 ENCOUNTER — CLINICAL SUPPORT (OUTPATIENT)
Dept: REHABILITATION | Facility: HOSPITAL | Age: 73
End: 2023-05-01
Attending: ORTHOPAEDIC SURGERY
Payer: MEDICARE

## 2023-05-01 DIAGNOSIS — M25.511 ACUTE PAIN OF RIGHT SHOULDER: Primary | ICD-10-CM

## 2023-05-01 PROCEDURE — 97112 NEUROMUSCULAR REEDUCATION: CPT | Mod: CQ

## 2023-05-01 NOTE — PROGRESS NOTES
"Physical Therapy Daily Treatment Note     Name: Marcos Barfield  Clinic Number: [id number]    Therapy Diagnosis:   Encounter Diagnosis   Name Primary?    Acute pain of right shoulder Yes     Physician: Kayleen Sherman MD    Visit Date: 5/1/2023  Physician Orders: PT Eval and Treat  Medical Diagnosis from Referral: M25.511 (ICD-10-CM) - Acute pain of right shoulder  Evaluation Date: 4/13/2023  Authorization Period Expiration: 3/28/2024  Plan of Care Expiration: 6/14/2023  Progress Note Due: 5/14/2023  FOTO: 1/1  Visit #/Visits authorized: 5/ 20     Time In: 1000  Time Out: 1100  Total Billable Time: 30 minutes    Precautions: Standard    Subjective     Pt reports: Cut my nerve when I cut myself with the . Have to get surgery   He was compliant with home exercise program.  Response to previous treatment: sore after eval  Functional change: none    Pain: 5/10  Location: right shoulder      Objective     Marcos received therapeutic exercises to develop strength, endurance, ROM, and posture for 15 minutes including  UBE 5/5 lvl 3-NT   Pulleys FL x 5 MIN-NP  Thoracic rotation B 20x (forearm to cheek)   CT self mob sit back EOT   Rows 5" 3x10 GTB    Marcos participated in neuromuscular re-education activities to improve: Kinesthetic, Proprioception, and Posture for 37 minutes. The following activities were included:  Resisted IR Gtb 3x10  Resisted ER otb   Scaption unloaded seated with GTB 3x10 B-NP  SNAG Cervical rotation *cueing 15x  V wall slides 4x10  SA punch outs with FL OTB 3x10      Marcos participated in dynamic functional therapeutic activities to improve functional performance for 00  minutes, including:        received the following manual therapy techniques: Joint mobilizations were applied to the: R shoulder and C-T/S for 08 min minutes, including:  Supine CTJUNX distraction mobs grade III   Post/inf GHJ mobs grade III-IV R      Received hot pack for 00 minutes to neck.     Home Exercises Provided and Patient " Education Provided     Education provided:   - cont HEP    Written Home Exercises Provided: Patient instructed to cont prior HEP.  Exercises were reviewed and Marcos was able to demonstrate them prior to the end of the session.  Marcos demonstrated good  understanding of the education provided.     See EMR under Patient Instructions for exercises provided prior visit.    Assessment     Marcos is canceling his Thursday appt 2* to procedure he as to get done on L forearm. Shoulder and neck symptoms are improving. Cont to progress as tolerated.   Marcos Is progressing well towards his goals.   Pt prognosis is Good.     Pt will continue to benefit from skilled outpatient physical therapy to address the deficits listed in the problem list box on initial evaluation, provide pt/family education and to maximize pt's level of independence in the home and community environment.     Pt's spiritual, cultural and educational needs considered and pt agreeable to plan of care and goals.    Anticipated barriers to physical therapy: none    Goals: Short Term Goals: 2-4 weeks  1. Pt will be compliant with HEP 50% of prescribed amount.   2. The pt to demo improvement in shoulder AROM to be pain free   3.  The patient to demo improvement in MMT by 1/3 deg to demo improvement in neuroms control and pain free MMT      Long Term Goals: 8-10 weeks   Pt will be compliant with % of prescribed amount.   The patient to demo tolerance to looking over the shoulder R & L to allow for cervical motion normalcy during driving   The patient to demo tolerance to reaching 5# overhead with minimal pain   The pt will report full participation in ADLs and IADLs without restrictions related to R shoulder and neck .     Plan     Cont w/ POC focusing on shoulder and perscapular strengthening as well as decrease cervical guarding.    Cierra Tomas, PTA

## 2023-05-08 ENCOUNTER — CLINICAL SUPPORT (OUTPATIENT)
Dept: REHABILITATION | Facility: HOSPITAL | Age: 73
End: 2023-05-08
Attending: ORTHOPAEDIC SURGERY
Payer: MEDICARE

## 2023-05-08 DIAGNOSIS — M25.511 ACUTE PAIN OF RIGHT SHOULDER: Primary | ICD-10-CM

## 2023-05-08 PROCEDURE — 97112 NEUROMUSCULAR REEDUCATION: CPT | Mod: CQ

## 2023-05-08 PROCEDURE — 97140 MANUAL THERAPY 1/> REGIONS: CPT | Mod: CQ

## 2023-05-08 PROCEDURE — 97110 THERAPEUTIC EXERCISES: CPT | Mod: CQ

## 2023-05-08 NOTE — PROGRESS NOTES
"Physical Therapy Daily Treatment Note     Name: Marcos Barfield  Clinic Number: [id number]    Therapy Diagnosis:   Encounter Diagnosis   Name Primary?    Acute pain of right shoulder Yes       Physician: Kayleen Sherman MD    Visit Date: 5/8/2023  Physician Orders: PT Eval and Treat  Medical Diagnosis from Referral: M25.511 (ICD-10-CM) - Acute pain of right shoulder  Evaluation Date: 4/13/2023  Authorization Period Expiration: 3/28/2024  Plan of Care Expiration: 6/14/2023  Progress Note Due: 5/14/2023  FOTO: 1/1  Visit #/Visits authorized: 6/ 20     Time In: 0900  Time Out: 1000  Total Billable Time: 60 minutes    Precautions: Standard    Subjective     Pt reports: shoulder is doing better  He was compliant with home exercise program.  Response to previous treatment: sore after eval  Functional change: none    Pain: 5/10  Location: right shoulder      Objective     Marcos received therapeutic exercises to develop strength, endurance, ROM, and posture for 15 minutes including  UBE 5/5 lvl 3-NT   Pulleys FL x 5 MIN-NP  Thoracic rotation B 20x (forearm to cheek)   CT self mob sit back EOT   Rows 5" 3x10 GTB    Marcos participated in neuromuscular re-education activities to improve: Kinesthetic, Proprioception, and Posture for 37 minutes. The following activities were included:  Resisted IR Gtb 3x12  Resisted ER Gtb 3x12  Scaption unloaded seated with GTB 3x10 B-NP  SNAG Cervical rotation *cueing 15x  V wall slides 4x10  SA punch outs with FL GTB 3x12  Prone T's 3x10    Marcos participated in dynamic functional therapeutic activities to improve functional performance for 00  minutes, including:        received the following manual therapy techniques: Joint mobilizations were applied to the: R shoulder and C-T/S for 08 min minutes, including:  Supine CTJUNX distraction mobs grade III   Post/inf GHJ mobs grade III-IV R      Received hot pack for 00 minutes to neck.     Home Exercises Provided and Patient Education Provided "     Education provided:   - cont HEP    Written Home Exercises Provided: Patient instructed to cont prior HEP.  Exercises were reviewed and Marcos was able to demonstrate them prior to the end of the session.  Marcos demonstrated good  understanding of the education provided.     See EMR under Patient Instructions for exercises provided prior visit.    Assessment     Marcos CT mobility is improve with manual therapy. Cont to have limited neck ROM. Able to increase resistance for ER strengthening. Cueing needed for proper scapular upward rotation w/ V wall slides. Technique improved after improving LT activation.   Marcos Is progressing well towards his goals.   Pt prognosis is Good.     Pt will continue to benefit from skilled outpatient physical therapy to address the deficits listed in the problem list box on initial evaluation, provide pt/family education and to maximize pt's level of independence in the home and community environment.     Pt's spiritual, cultural and educational needs considered and pt agreeable to plan of care and goals.    Anticipated barriers to physical therapy: none    Goals: Short Term Goals: 2-4 weeks  1. Pt will be compliant with HEP 50% of prescribed amount.   2. The pt to demo improvement in shoulder AROM to be pain free   3.  The patient to demo improvement in MMT by 1/3 deg to demo improvement in neuroms control and pain free MMT      Long Term Goals: 8-10 weeks   Pt will be compliant with % of prescribed amount.   The patient to demo tolerance to looking over the shoulder R & L to allow for cervical motion normalcy during driving   The patient to demo tolerance to reaching 5# overhead with minimal pain   The pt will report full participation in ADLs and IADLs without restrictions related to R shoulder and neck .     Plan     Cont w/ POC focusing on shoulder and perscapular strengthening as well as decrease cervical guarding.    Cierra Tomas, PTA

## 2023-05-12 ENCOUNTER — CLINICAL SUPPORT (OUTPATIENT)
Dept: REHABILITATION | Facility: HOSPITAL | Age: 73
End: 2023-05-12
Attending: ORTHOPAEDIC SURGERY
Payer: MEDICARE

## 2023-05-12 DIAGNOSIS — M25.511 ACUTE PAIN OF RIGHT SHOULDER: Primary | ICD-10-CM

## 2023-05-12 PROCEDURE — 97140 MANUAL THERAPY 1/> REGIONS: CPT | Mod: CQ

## 2023-05-12 PROCEDURE — 97110 THERAPEUTIC EXERCISES: CPT | Mod: CQ

## 2023-05-12 PROCEDURE — 97112 NEUROMUSCULAR REEDUCATION: CPT | Mod: CQ

## 2023-05-12 NOTE — PROGRESS NOTES
Physical Therapy Daily Treatment Note     Name: Marcos Barfield  [facility] Number: [id number]    Therapy Diagnosis:   Encounter Diagnosis   Name Primary?    Acute pain of right shoulder Yes         Physician: Kayleen Sherman MD    Visit Date: 5/12/2023  Physician Orders: PT Eval and Treat  Medical Diagnosis from Referral: M25.511 (ICD-10-CM) - Acute pain of right shoulder  Evaluation Date: 4/13/2023  Authorization Period Expiration: 3/28/2024  Plan of Care Expiration: 6/14/2023  Progress Note Due: 5/14/2023  FOTO: 1/1  Visit #/Visits authorized: 7/ 20     Time In: 1005  Time Out: 1100  Total Billable Time: 55 minutes    Precautions: Standard    Subjective     Pt reports: Have not noticed neck or shoulder symptoms lately  He was compliant with home exercise program.  Response to previous treatment: sore after eval  Functional change: none    Pain: 5/10  Location: right shoulder      Objective     Marcos received therapeutic exercises to develop strength, endurance, ROM, and posture for 20 minutes including  UBE 5/5 lvl 4  Pulleys FL x 5 MIN-NP  Thoracic rotation B 20x (forearm to cheek)   CT self mob sit back EOT       Marcos participated in neuromuscular re-education activities to improve: Kinesthetic, Proprioception, and Posture for 27 minutes. The following activities were included:  Sidelying ER # 1 3x12  ER /IR walkouts @ 90 FL 2x10  Scaption standing #1-2 3x10 B  SNAG Cervical rotation *cueing 15x  V wall slides w/ lift off 4x10  SA punch out with CC #3 4x8    Marcos participated in dynamic functional therapeutic activities to improve functional performance for 00  minutes, including:        received the following manual therapy techniques: Joint mobilizations were applied to the: R shoulder and C-T/S for 08 min minutes, including:  Supine CTJUNX distraction mobs grade III   Post/inf GHJ mobs grade III-IV R      Received hot pack for 00 minutes to neck.     Home Exercises Provided and Patient Education Provided     Education  provided:   - cont HEP    Written Home Exercises Provided: Patient instructed to cont prior HEP.  Exercises were reviewed and Marcos was able to demonstrate them prior to the end of the session.  Marcos demonstrated good  understanding of the education provided.     See EMR under Patient Instructions for exercises provided prior visit.    Assessment     Marcos CT mobility is improve with manual therapy. Cont to have limited neck ROM. Excessive UT usage w/ scaption exercises showing that he still needs to work on SA and LT recruitment. Upward scap rotation improved with V wall slides. Next session may benefit from METs for LT activation. Progressed ER strengthening with cueing needed for scapular stabilization.   Marcos Is progressing well towards his goals.   Pt prognosis is Good.     Pt will continue to benefit from skilled outpatient physical therapy to address the deficits listed in the problem list box on initial evaluation, provide pt/family education and to maximize pt's level of independence in the home and community environment.     Pt's spiritual, cultural and educational needs considered and pt agreeable to plan of care and goals.    Anticipated barriers to physical therapy: none    Goals: Short Term Goals: 2-4 weeks  1. Pt will be compliant with HEP 50% of prescribed amount.   2. The pt to demo improvement in shoulder AROM to be pain free   3.  The patient to demo improvement in MMT by 1/3 deg to demo improvement in neuroms control and pain free MMT      Long Term Goals: 8-10 weeks   Pt will be compliant with % of prescribed amount.   The patient to demo tolerance to looking over the shoulder R & L to allow for cervical motion normalcy during driving   The patient to demo tolerance to reaching 5# overhead with minimal pain   The pt will report full participation in ADLs and IADLs without restrictions related to R shoulder and neck .     Plan     Cont w/ POC focusing on shoulder and perscapular  strengthening as well as decrease cervical guarding.    Cierra Tomas, PTA

## 2023-05-18 ENCOUNTER — CLINICAL SUPPORT (OUTPATIENT)
Dept: REHABILITATION | Facility: HOSPITAL | Age: 73
End: 2023-05-18
Attending: ORTHOPAEDIC SURGERY
Payer: MEDICARE

## 2023-05-18 DIAGNOSIS — R29.898 WEAKNESS OF SHOULDER: Primary | ICD-10-CM

## 2023-05-18 PROCEDURE — 97112 NEUROMUSCULAR REEDUCATION: CPT

## 2023-05-18 PROCEDURE — 97110 THERAPEUTIC EXERCISES: CPT
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
## 2024-01-31 ENCOUNTER — HOSPITAL ENCOUNTER (OUTPATIENT)
Dept: RADIOLOGY | Facility: HOSPITAL | Age: 74
Discharge: HOME | End: 2024-01-31
Attending: ORTHOPAEDIC SURGERY
Payer: MEDICARE

## 2024-01-31 ENCOUNTER — OFFICE VISIT (OUTPATIENT)
Dept: SPORTS MEDICINE | Facility: CLINIC | Age: 74
End: 2024-01-31
Payer: MEDICARE

## 2024-01-31 VITALS
HEIGHT: 70 IN | HEART RATE: 70 BPM | BODY MASS INDEX: 23.51 KG/M2 | DIASTOLIC BLOOD PRESSURE: 76 MMHG | SYSTOLIC BLOOD PRESSURE: 149 MMHG | WEIGHT: 164.25 LBS

## 2024-01-31 DIAGNOSIS — M25.512 LEFT SHOULDER PAIN, UNSPECIFIED CHRONICITY: ICD-10-CM

## 2024-01-31 DIAGNOSIS — M25.519 SHOULDER PAIN, UNSPECIFIED CHRONICITY, UNSPECIFIED LATERALITY: ICD-10-CM

## 2024-01-31 DIAGNOSIS — M25.512 LEFT SHOULDER PAIN, UNSPECIFIED CHRONICITY: Primary | ICD-10-CM

## 2024-01-31 PROCEDURE — 3077F SYST BP >= 140 MM HG: CPT | Mod: CPTII,S$GLB,, | Performed by: ORTHOPAEDIC SURGERY

## 2024-01-31 PROCEDURE — 3078F DIAST BP <80 MM HG: CPT | Mod: CPTII,S$GLB,, | Performed by: ORTHOPAEDIC SURGERY

## 2024-01-31 PROCEDURE — 99999 PR PBB SHADOW E&M-EST. PATIENT-LVL III: CPT | Mod: PBBFAC,,, | Performed by: ORTHOPAEDIC SURGERY

## 2024-01-31 PROCEDURE — 3288F FALL RISK ASSESSMENT DOCD: CPT | Mod: CPTII,S$GLB,, | Performed by: ORTHOPAEDIC SURGERY

## 2024-01-31 PROCEDURE — 73030 X-RAY EXAM OF SHOULDER: CPT | Mod: 26,LT,, | Performed by: RADIOLOGY

## 2024-01-31 PROCEDURE — 1101F PT FALLS ASSESS-DOCD LE1/YR: CPT | Mod: CPTII,S$GLB,, | Performed by: ORTHOPAEDIC SURGERY

## 2024-01-31 PROCEDURE — 1125F AMNT PAIN NOTED PAIN PRSNT: CPT | Mod: CPTII,S$GLB,, | Performed by: ORTHOPAEDIC SURGERY

## 2024-01-31 PROCEDURE — 73030 X-RAY EXAM OF SHOULDER: CPT | Mod: TC,LT

## 2024-01-31 PROCEDURE — 1159F MED LIST DOCD IN RCRD: CPT | Mod: CPTII,S$GLB,, | Performed by: ORTHOPAEDIC SURGERY

## 2024-01-31 PROCEDURE — 99214 OFFICE O/P EST MOD 30 MIN: CPT | Mod: S$GLB,,, | Performed by: ORTHOPAEDIC SURGERY
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
## 2024-02-15 ENCOUNTER — HOSPITAL ENCOUNTER (OUTPATIENT)
Dept: RADIOLOGY | Facility: HOSPITAL | Age: 74
Discharge: HOME | End: 2024-02-15
Attending: STUDENT IN AN ORGANIZED HEALTH CARE EDUCATION/TRAINING PROGRAM
Payer: MEDICARE

## 2024-02-15 DIAGNOSIS — M25.519 SHOULDER PAIN, UNSPECIFIED CHRONICITY, UNSPECIFIED LATERALITY: ICD-10-CM

## 2024-02-15 PROCEDURE — 73221 MRI JOINT UPR EXTREM W/O DYE: CPT | Mod: TC,LT

## 2024-02-15 PROCEDURE — 73221 MRI JOINT UPR EXTREM W/O DYE: CPT | Mod: 26,LT,, | Performed by: INTERNAL MEDICINE

## 2024-02-19 ENCOUNTER — TELEPHONE (OUTPATIENT)
Dept: SPORTS MEDICINE | Facility: CLINIC | Age: 74
End: 2024-02-19
Payer: MEDICARE

## 2024-02-19 NOTE — TELEPHONE ENCOUNTER
Spoke with patient via telephone today regarding his left shoulder MRI results and treatment options moving forward.     MRI of the left shoulder shows high grade partial thickness tear of the supraspinatus near its insertion with grade 0 goutallier changes     Discussed treatment options moving forward. Patient has been dealing with his left shoulder pain for months and continues to be miserable with his symptoms despite extensive non operative management including activity modification and anti-inflammatory medications. He would like to proceed with surgical intervention as below:        PLAN:       left   a. Shoulder arthroscopic rotator cuff repair vs debridement with Cuffmend   b. Shoulder arthroscopic SAD   c. Shoulder arthroscopic extensive debridement   d. Shoulder arthroscopic biceps tenodesis (vs. subpect tenodesis)   e. Shoulder arthroscopic possible microfracture   f.  Shoulder open reduction internal fixation greater tuberosity    Quinn Galvez M.D.  Orthopedic Sports Medicine Fellow

## 2024-02-20 ENCOUNTER — TELEPHONE (OUTPATIENT)
Dept: SPORTS MEDICINE | Facility: CLINIC | Age: 74
End: 2024-02-20
Payer: MEDICARE

## 2024-02-20 ENCOUNTER — PATIENT MESSAGE (OUTPATIENT)
Dept: SPORTS MEDICINE | Facility: CLINIC | Age: 74
End: 2024-02-20
Payer: MEDICARE

## 2024-02-20 NOTE — TELEPHONE ENCOUNTER
----- Message from Kassidy Florian MA sent at 2/19/2024  3:58 PM CST -----    ----- Message -----  From: Quinn Galvez MD  Sent: 2/19/2024   3:29 PM CST  To: Kassidy Florian MA    Spoke with patient on phone regarding MRI after reviewing with Whit. He wants to proceed with surgery ASAP as listed in my note. Thanks   ----- Message -----  From: Kassidy Florian MA  Sent: 2/19/2024   2:47 PM CST  To: Quinn Galvez MD; Quinn Galvez MD      ----- Message -----  From: Belen Mehta  Sent: 2/19/2024  12:52 PM CST  To: Whit Beyer Staff    Pt calling for MRI results , pt is a lot of pain     Confirmed patient's contact info below:  Contact Name: Marcos Barfield  Phone Number: [phone]

## 2024-02-20 NOTE — TELEPHONE ENCOUNTER
I called and spoke with the patient and scheduled him for surgery at his convenience. He was wanting as soon as possible but now will need to schedule in March due to his schedule. He was informed that he would require PCP clearance

## 2024-02-21 ENCOUNTER — TELEPHONE (OUTPATIENT)
Dept: SPORTS MEDICINE | Facility: CLINIC | Age: 74
End: 2024-02-21
Payer: MEDICARE

## 2024-02-21 NOTE — TELEPHONE ENCOUNTER
Spoke with patient regarding Sx clearance, advised to bring letter from PCP that he is cleared to go under general anesthesia. Patient understood.

## 2024-02-21 NOTE — TELEPHONE ENCOUNTER
----- Message from Blanca Herrmann sent at 2/21/2024  8:31 AM CST -----  Regarding: sx questions  PATIENT CALL    Pt called regarding upcoming shoulder surgery. States that his PCP is no longer Dr Cullen, as listed on his chart. [facility] and would like to verify what clearance is needed.  Please call back at [phone]

## 2024-02-22 DIAGNOSIS — M75.22 BICEPS TENDONITIS ON LEFT: ICD-10-CM

## 2024-02-22 DIAGNOSIS — M75.102 NON-TRAUMATIC ROTATOR CUFF TEAR, LEFT: Primary | ICD-10-CM

## 2024-02-22 DIAGNOSIS — S43.432A SLAP LESION OF LEFT SHOULDER: ICD-10-CM

## 2024-02-22 DIAGNOSIS — S42.255A CLOSED NONDISPLACED FRACTURE OF GREATER TUBEROSITY OF LEFT HUMERUS: ICD-10-CM

## 2024-02-27 ENCOUNTER — TELEPHONE (OUTPATIENT)
Dept: SPORTS MEDICINE | Facility: CLINIC | Age: 74
End: 2024-02-27
Payer: MEDICARE

## 2024-02-27 NOTE — TELEPHONE ENCOUNTER
----- Message from Temi Figueroa sent at 2/27/2024  2:26 PM CST -----  Please call patient to get more specifics of why he is calling.     Temi Figueroa   Clinical assistant to Dr. Kayleen Sherman    ----- Message -----  From: Belen Mehta  Sent: 2/27/2024  10:44 AM CST  To: Whit Beyer Staff    Pt calling in regards to discussing his medical records     Confirmed patient's contact info below:  Contact Name: Marcos Barfield  Phone Number: [phone]

## 2024-02-27 NOTE — TELEPHONE ENCOUNTER
Spoke with pt, pt had questions regarding clearance for surgery. Pt also informed me that he has a new PCP that he will be seeing on 3/5/24 and will get clearance from him for surgery

## 2024-03-01 ENCOUNTER — OFFICE VISIT (OUTPATIENT)
Dept: SPORTS MEDICINE | Facility: CLINIC | Age: 74
End: 2024-03-01
Payer: MEDICARE

## 2024-03-01 VITALS
BODY MASS INDEX: 24.04 KG/M2 | HEART RATE: 74 BPM | DIASTOLIC BLOOD PRESSURE: 76 MMHG | SYSTOLIC BLOOD PRESSURE: 162 MMHG | WEIGHT: 167.56 LBS

## 2024-03-01 DIAGNOSIS — M75.102 NON-TRAUMATIC ROTATOR CUFF TEAR, LEFT: Primary | ICD-10-CM

## 2024-03-01 PROCEDURE — 99499 UNLISTED E&M SERVICE: CPT | Mod: S$GLB,,, | Performed by: PHYSICIAN ASSISTANT

## 2024-03-01 PROCEDURE — 99999 PR PBB SHADOW E&M-EST. PATIENT-LVL III: CPT | Mod: PBBFAC,,, | Performed by: PHYSICIAN ASSISTANT

## 2024-03-03 RX ORDER — SODIUM CHLORIDE 9 MG/ML
INJECTION, SOLUTION INTRAVENOUS CONTINUOUS
Status: CANCELLED | OUTPATIENT
Start: 2024-03-04

## 2024-03-03 RX ORDER — CLINDAMYCIN PHOSPHATE 900 MG/50ML
900 INJECTION, SOLUTION INTRAVENOUS
Status: CANCELLED | OUTPATIENT
Start: 2024-03-03

## 2024-03-03 RX ORDER — DOCUSATE SODIUM 100 MG/1
100 CAPSULE, LIQUID FILLED ORAL 2 TIMES DAILY PRN
Qty: 20 CAPSULE | Refills: 0 | Status: SHIPPED | OUTPATIENT
Start: 2024-03-03

## 2024-03-03 RX ORDER — OXYCODONE AND ACETAMINOPHEN 10; 325 MG/1; MG/1
TABLET ORAL
Qty: 21 TABLET | Refills: 0 | Status: SHIPPED | OUTPATIENT
Start: 2024-03-03

## 2024-03-03 RX ORDER — PROMETHAZINE HYDROCHLORIDE 25 MG/1
25 TABLET ORAL EVERY 6 HOURS PRN
Qty: 8 TABLET | Refills: 0 | Status: SHIPPED | OUTPATIENT
Start: 2024-03-03

## 2024-03-03 RX ORDER — TRAMADOL HYDROCHLORIDE 50 MG/1
50-100 TABLET ORAL EVERY 6 HOURS PRN
Qty: 21 TABLET | Refills: 0 | Status: SHIPPED | OUTPATIENT
Start: 2024-03-03

## 2024-03-04 NOTE — H&P
Marcos Barfield  is here for a completion of his perioperative paperwork. he  Is scheduled to undergo     left              a. Shoulder arthroscopic rotator cuff repair vs debridement with Cuffmend              b. Shoulder arthroscopic SAD              c. Shoulder arthroscopic extensive debridement              d. Shoulder arthroscopic biceps tenodesis (vs. subpect tenodesis)              e. Shoulder arthroscopic possible microfracture              f.  Shoulder open reduction internal fixation greater tuberosity on 3/12/24.      He is a healthy individual and does need clearance for this procedure which he has recieved from internal med.     PAST MEDICAL HISTORY:   Past Medical History:   Diagnosis Date    Anemia     kid     CKD (chronic kidney disease)     Diabetes mellitus     High cholesterol     Hypertension      PAST SURGICAL HISTORY:   Past Surgical History:   Procedure Laterality Date    ARTHROSCOPIC DEBRIDEMENT OF SHOULDER Right 8/20/2020    Procedure: DEBRIDEMENT, SHOULDER, ARTHROSCOPIC;  Surgeon: Kayleen Sherman MD;  Location: [facility] OR;  Service: Orthopedics;  Laterality: Right;    ARTHROSCOPIC REPAIR OF ROTATOR CUFF OF SHOULDER Right 8/20/2020    Procedure: REPAIR, ROTATOR CUFF, ARTHROSCOPIC;  Surgeon: Kayleen Sherman MD;  Location: [facility] OR;  Service: Orthopedics;  Laterality: Right;    ARTHROSCOPY OF SHOULDER WITH REMOVAL OF DISTAL CLAVICLE Right 8/20/2020    Procedure: ARTHROSCOPY, SHOULDER, WITH DISTAL CLAVICLE EXCISION;  Surgeon: Kayleen Sherman MD;  Location: [facility] OR;  Service: Orthopedics;  Laterality: Right;    COLONOSCOPY      x2    CYST REMOVAL      mid back    FIXATION OF TENDON Right 8/20/2020    Procedure: FIXATION, TENDON, Biceps Tenodesis;  Surgeon: Kayleen Sherman MD;  Location: [facility] OR;  Service: Orthopedics;  Laterality: Right;  FIXATION, TENDON, Biceps Tenodesis    ORIF HUMERUS FRACTURE Right 8/20/2020    Procedure: ORIF, FRACTURE, HUMERUS;  Surgeon: Kayleen Sherman MD;  Location: [facility] OR;  Service:  Orthopedics;  Laterality: Right;    SHOULDER SURGERY      VASECTOMY       FAMILY HISTORY: History reviewed. No pertinent family history.  SOCIAL HISTORY:   Social History     Socioeconomic History    Marital status:    Tobacco Use    Smoking status: Never    Smokeless tobacco: Never   Substance and Sexual Activity    Alcohol use: No    Drug use: No    Sexual activity: Yes       MEDICATIONS:   Current Outpatient Medications:     ascorbic acid-bioflavonoids 200-300 mg Tab, TAKE  BY MOUTH, Disp: , Rfl:     aspirin (ECOTRIN) 81 MG EC tablet, Take 81 mg by mouth once daily.  , Disp: , Rfl:     beta-carotene,A,-vits C,E/mins (OCUVITE ORAL), Take 1 tablet by mouth 2 (two) times a day., Disp: , Rfl:     cyanocobalamin (VITAMIN B-12) 1000 MCG tablet, TAKE ONE TABLET BY MOUTH EVERY DAY FOR VITAMIN DEFICIENCIES, Disp: , Rfl:     fluticasone (FLONASE) 50 mcg/actuation nasal spray, , Disp: , Rfl: 0    gabapentin (NEURONTIN) 300 MG capsule, Take 300 mg by mouth 3 (three) times daily., Disp: , Rfl:     insulin aspart U-100 (NOVOLOG) 100 unit/mL (3 mL) InPn pen, INJECT 5 UNITS SUBCUTANEOUSLY WITH SUPPER FOR DIABETES  WITH FIRST BITE OF MAIN MEAL..   DONOTINJECT ASPART AT BEDTIME. DO NOT INJECT ASPART IF YOU ARE SKIPPING  THE ASSOCIATED MEAL. FOR DIABETES  WITH FIRST BITE OF MAIN MEAL..    DONOTINJECT ASPART AT BEDTIME. DO NOT INJECT ASPART IF YOU ARE SKIPPING   THE ASSOCIATED MEAL., Disp: , Rfl:     insulin glargine (LANTUS) 100 unit/mL injection, Inject 32 Units into the skin every evening.  , Disp: , Rfl:     lisinopril (PRINIVIL,ZESTRIL) 20 MG tablet, Take 20 mg by mouth once daily., Disp: , Rfl:     lisinopriL (PRINIVIL,ZESTRIL) 40 MG tablet, TAKE ONE-HALF TABLET BY MOUTH QD FOR HEART/BLOOD PRESSURE, Disp: , Rfl:     lisinopriL 10 MG tablet, Take 10 mg by mouth., Disp: , Rfl:     metFORMIN (GLUMETZA) 1000 MG (MOD) 24hr tablet, Take 1,000 mg by mouth., Disp: , Rfl:     mupirocin (BACTROBAN) 2 % ointment, Apply topically 2  (two) times daily., Disp: 22 g, Rfl: 0    ondansetron (ZOFRAN) 4 MG tablet, Take 1 tablet (4 mg total) by mouth every 8 (eight) hours as needed for Nausea., Disp: 12 tablet, Rfl: 0    pravastatin (PRAVACHOL) 80 MG tablet, Take 80 mg by mouth once daily., Disp: , Rfl:     sildenafiL (VIAGRA) 100 MG tablet, TAKE ONE-HALF TABLET BY MOUTH EVERY DAY 30 TO 60 MINUTES BEFORE INTERCOURSE FOR BEST RESULTS TAKE ON EMPTY STOMACH, Disp: , Rfl:     docusate sodium (COLACE) 100 MG capsule, Take 1 capsule (100 mg total) by mouth 2 (two) times daily as needed for Constipation., Disp: 20 capsule, Rfl: 0    meclizine (ANTIVERT) 25 mg tablet, , Disp: , Rfl: 3    oxyCODONE-acetaminophen (PERCOCET)  mg per tablet, Take 1 tablet by mouth every 4-6 hours as needed for pain. Take stool softener with this medication., Disp: 21 tablet, Rfl: 0    promethazine (PHENERGAN) 25 MG tablet, Take 1 tablet (25 mg total) by mouth every 6 (six) hours as needed for Nausea., Disp: 8 tablet, Rfl: 0    traMADoL (ULTRAM) 50 mg tablet, Take 1-2 tablets ( mg total) by mouth every 6 (six) hours as needed for Pain., Disp: 21 tablet, Rfl: 0  ALLERGIES:   Review of patient's allergies indicates:   Allergen Reactions    Meloxicam Other (See Comments)     Drove blood pressure james high per pt       VITAL SIGNS: BP (!) 162/76   Pulse 74   Wt 76 kg (167 lb 8.8 oz)   BMI 24.04 kg/m²      Risks, indications and benefits of the surgical procedure were discussed with the patient. All questions with regard to surgery, rehab, expected return to functional activities, activities of daily living and recreational endeavors were answered to his satisfaction.    It was explained to the patient that there may be an increase in surgical risks if the patient has certain co-morbidities such as but not limited to: Obesity, Cardiovascular issues (CHF, CAD, Arrhythmias), chronic pulmonary issues, previous or current neurovascular/neurological issues, previous strokes,  diabetes mellitus, previous wound healing issues, previous wound or skin infections, PVD, clotting disorders, if the patient uses chronic steroids, if the patient takes or has immune compromising medications or diseases, or has previously or currently used tobacco products.     The patient verbalized that he/she does not have any additional clotting, bleeding, or blood disorders, other than what is list in her chart on today's review.     Then a brief history and physical exam were performed.    Review of Systems   Constitution: Negative. Negative for chills, fever and night sweats.   HENT: Negative for congestion and headaches.    Eyes: Negative for blurred vision, left vision loss and right vision loss.   Cardiovascular: Negative for chest pain and syncope.   Respiratory: Negative for cough and shortness of breath.    Endocrine: Negative for polydipsia, polyphagia and polyuria.   Hematologic/Lymphatic: Negative for bleeding problem. Does not bruise/bleed easily.   Skin: Negative for dry skin, itching and rash.   Musculoskeletal: Negative for falls and muscle weakness.   Gastrointestinal: Negative for abdominal pain and bowel incontinence.   Genitourinary: Negative for bladder incontinence and nocturia.   Neurological: Negative for disturbances in coordination, loss of balance and seizures.   Psychiatric/Behavioral: Negative for depression. The patient does not have insomnia.    Allergic/Immunologic: Negative for hives and persistent infections.     PHYSICAL EXAM:  GEN: A&Ox3, WD WN NAD  HEENT: WNL  CHEST: CTAB, no W/R/R  HEART: RRR, no M/R/G  ABD: Soft, NT ND, BS x4 QUADS  MS; See Epic  NEURO: CN II-XII intact       The surgical consent was then reviewed with the patient, who agreed with all the contents of the consent form and it was signed. he was then given the surgery packet to bring with him to surgery center for the anesthesia portion of his perioperative paperwork (if needed)   For all physicians except for  Dr. Nova, we will email and possibly fax the consent forms and booking sheets to [facility].    The patient was given the opportunity to ask questions about the surgical plan and consent form, and once no other questions were asked, I proceeded with the pre-op appointment.    PHYSICAL THERAPY:  He was also instructed regarding physical therapy and will begin on 10 days post-op He was given a copy of the original prescription to schedule. Another copy of this prescription was also faxed to [facility].    POST OP CARE:instructions were reviewed including care of the wound and dressing after surgery and when he can shower. Patient was told not sleep or lay on there surgical extremity following surgery as this could cause repair damage, tissue damage, or nerve injury.    An extensive amount of time was spent on discussion of the following information based on what type of surgery the patient was having. Patient expressed understanding of the material below:    Shoulder surgery or upper extremity surgery requiring post-op sling:  It was explained to the patient that they should remove their arm from the sling approximately 6 times per day to do full elbow ROM (flexion and extension) and full supination and pronation of the elbow for approximately 5 minutes at a time to help prevent elbow stiffness, nerve pain or problems, or nerve injury. They were told to contact us if they begin having numbness and tingling of there surgical extremity that persists longer then 1 day without relief.     Extremity surgery requiring a splint:   It was explained to patient on how to properly elevated position there extremity to prevent pressure ulcers from occurring. I made sure that the patient understood that that surgical site may be numb following surgery and prevent them from feeling pressure pain that they would normal feel if a pressure injury was occurring. Pressure ulcers and there causes were discussed with the  patient today.     Post-operative splint:  It was explain to the patient that they can contact us at anytime if they feel that there is a problem with their splint or under their splint that needs evaluation. If there is concern, questions, or discomfort with the splint then they can present to either our clinic or the [facility] ED for removal, evaluation, and replacement of the splint.    CRUTCHES OR WALKER: It was explained to the patient that if they are having a lower extremity surgery that they will require either a walker or crutches to ambulate safely with after surgery. It was explained that a cane or other assistive devices are not sufficient to safely ambulate with after surgery. I explained to the patient that I will place an order for them to receive either crutches or a walker after surgery to go home with. It was explained that if they have crutches or a walker at home already, that they are REQUIRED to bring them to the hospital on the day of surgery. It was explained that if they do not have them at the hospital on the day of surgery that they WILL be provided a new pair or crutches or a walker to go home with to ensure ambulation will be safe if the patient needs to stop somewhere on the way home.      If the patient's mobility limitation cannot be sufficiently resolved by the use of crutches then it is necessary for the patient's functional mobility deficit to be sufficiently resolved with the use of a (Rolling Walker or Walker). Patient's mobility limitation significantly impairs their ability to participate in one of more activities of daily living. The use of a (Rolling Walker or Walker) will significantly improve the patient's ability to participate in MRADLS and the patient will use it on regular basis in the home.      PAIN MANAGEMENT: Marcos Barfield was also given a pain management regime, which includes the option of getting a TENS unit given to him by [facility] (if patient  chooses) along with the education required for its use. He was also instructed regarding the Polar ice unit or gel ice packs (chosen by patient) that will be in place after surgery and his postoperative pain medications.     Patient understands that Polar Ice machine has to be bought today if they want it. It cannot be bought on day of surgery at surgery center.     PAIN MEDICATION:  Percocet 10/325mg 1 po q 4-6 hours prn pain  Ultram 50 mg Take 1-2 p.o. q.6 hours p.r.n. breakthrough pain,   Phenergan 25 mg one p.o. q.6 hours p.r.n. nausea and vomiting.    DVT prophylaxis was discussed with the patient today including risk factors for developing DVTs and history of DVTs. The patient was asked if any specific recommendations were given from the doctor/s that did pre-operative surgical clearance. The patient was then given an education sheet about DVTs and PE with warning signs and symptoms of both and steps to take if they suspect either of these.    This along with the Modified Caprini risk assessment model for VTE in general surgical patients was used to determine the patient's DVT risk.     From: Iman MK, Santiago DA, Evelyn SM, et al. Prevention of VTE in nonorthopedic surgical patients: antithrombotic therapy and prevention of thrombosis, 9th ed: American College [employer] evidence-based clinical practical guidelines. Chest 2012; 141:e227S. Copyright © 2012. Reproduced with permission from the American College of Chest [employer]icians.    The below listed DVT prophylaxis regimen along with bilateral ILIA compression stockings will be used post-op. Length of treatment has been determined to be 10-42 days post-op by the above noted Caprini assessment model.     The patient was instructed to buy and take:  Aspirin 81mg QD x 4 weeks for DVT prophylaxis starting on the morning after surgery.  Patient will also use bilateral TEDs on lower extremities, SCDs during surgery, and early ambulation post-op. If the patient  was previously taking 81mg baby aspirin, they were told to not take it starting 5 days prior to surgery and to restart the 81mg aspirin after surgery.       Patient was also told to buy over the counter Prilosec medication if needed and take it once daily for GI protection as long as they are taking NSAIDs or Aspirin.   Patient denies history of seizures.      The patient was told that narcotic pain medications may make them drowsy and instructions were given to not sign legal documents, drive or operate heavy machinery, cars, or equipment while under the influence of narcotic medications. The patient was told and understands that narcotic pain medications should only be used as needed to control pain and that other options of pain control include TENs unit and ice packs/unit.     As there were no other questions to be asked, he was given my business card along with Kayleen Sherman MD business card if he has any questions or concerns prior to surgery or in the postop period.

## 2024-03-04 NOTE — H&P (VIEW-ONLY)
Marcos Barfield  is here for a completion of his perioperative paperwork. he  Is scheduled to undergo     left              a. Shoulder arthroscopic rotator cuff repair vs debridement with Cuffmend              b. Shoulder arthroscopic SAD              c. Shoulder arthroscopic extensive debridement              d. Shoulder arthroscopic biceps tenodesis (vs. subpect tenodesis)              e. Shoulder arthroscopic possible microfracture              f.  Shoulder open reduction internal fixation greater tuberosity on 3/12/24.      He is a healthy individual and does need clearance for this procedure which he has recieved from internal med.     PAST MEDICAL HISTORY:   Past Medical History:   Diagnosis Date    Anemia     kid     CKD (chronic kidney disease)     Diabetes mellitus     High cholesterol     Hypertension      PAST SURGICAL HISTORY:   Past Surgical History:   Procedure Laterality Date    ARTHROSCOPIC DEBRIDEMENT OF SHOULDER Right 8/20/2020    Procedure: DEBRIDEMENT, SHOULDER, ARTHROSCOPIC;  Surgeon: Kayleen Sherman MD;  Location: [facility] OR;  Service: Orthopedics;  Laterality: Right;    ARTHROSCOPIC REPAIR OF ROTATOR CUFF OF SHOULDER Right 8/20/2020    Procedure: REPAIR, ROTATOR CUFF, ARTHROSCOPIC;  Surgeon: Kayleen Sherman MD;  Location: [facility] OR;  Service: Orthopedics;  Laterality: Right;    ARTHROSCOPY OF SHOULDER WITH REMOVAL OF DISTAL CLAVICLE Right 8/20/2020    Procedure: ARTHROSCOPY, SHOULDER, WITH DISTAL CLAVICLE EXCISION;  Surgeon: Kayleen Sherman MD;  Location: [facility] OR;  Service: Orthopedics;  Laterality: Right;    COLONOSCOPY      x2    CYST REMOVAL      mid back    FIXATION OF TENDON Right 8/20/2020    Procedure: FIXATION, TENDON, Biceps Tenodesis;  Surgeon: Kayleen Sherman MD;  Location: [facility] OR;  Service: Orthopedics;  Laterality: Right;  FIXATION, TENDON, Biceps Tenodesis    ORIF HUMERUS FRACTURE Right 8/20/2020    Procedure: ORIF, FRACTURE, HUMERUS;  Surgeon: Kayleen Sherman MD;  Location: [facility] OR;  Service:  Orthopedics;  Laterality: Right;    SHOULDER SURGERY      VASECTOMY       FAMILY HISTORY: History reviewed. No pertinent family history.  SOCIAL HISTORY:   Social History     Socioeconomic History    Marital status:    Tobacco Use    Smoking status: Never    Smokeless tobacco: Never   Substance and Sexual Activity    Alcohol use: No    Drug use: No    Sexual activity: Yes       MEDICATIONS:   Current Outpatient Medications:     ascorbic acid-bioflavonoids 200-300 mg Tab, TAKE  BY MOUTH, Disp: , Rfl:     aspirin (ECOTRIN) 81 MG EC tablet, Take 81 mg by mouth once daily.  , Disp: , Rfl:     beta-carotene,A,-vits C,E/mins (OCUVITE ORAL), Take 1 tablet by mouth 2 (two) times a day., Disp: , Rfl:     cyanocobalamin (VITAMIN B-12) 1000 MCG tablet, TAKE ONE TABLET BY MOUTH EVERY DAY FOR VITAMIN DEFICIENCIES, Disp: , Rfl:     fluticasone (FLONASE) 50 mcg/actuation nasal spray, , Disp: , Rfl: 0    gabapentin (NEURONTIN) 300 MG capsule, Take 300 mg by mouth 3 (three) times daily., Disp: , Rfl:     insulin aspart U-100 (NOVOLOG) 100 unit/mL (3 mL) InPn pen, INJECT 5 UNITS SUBCUTANEOUSLY WITH SUPPER FOR DIABETES  WITH FIRST BITE OF MAIN MEAL..   DONOTINJECT ASPART AT BEDTIME. DO NOT INJECT ASPART IF YOU ARE SKIPPING  THE ASSOCIATED MEAL. FOR DIABETES  WITH FIRST BITE OF MAIN MEAL..    DONOTINJECT ASPART AT BEDTIME. DO NOT INJECT ASPART IF YOU ARE SKIPPING   THE ASSOCIATED MEAL., Disp: , Rfl:     insulin glargine (LANTUS) 100 unit/mL injection, Inject 32 Units into the skin every evening.  , Disp: , Rfl:     lisinopril (PRINIVIL,ZESTRIL) 20 MG tablet, Take 20 mg by mouth once daily., Disp: , Rfl:     lisinopriL (PRINIVIL,ZESTRIL) 40 MG tablet, TAKE ONE-HALF TABLET BY MOUTH QD FOR HEART/BLOOD PRESSURE, Disp: , Rfl:     lisinopriL 10 MG tablet, Take 10 mg by mouth., Disp: , Rfl:     metFORMIN (GLUMETZA) 1000 MG (MOD) 24hr tablet, Take 1,000 mg by mouth., Disp: , Rfl:     mupirocin (BACTROBAN) 2 % ointment, Apply topically 2  (two) times daily., Disp: 22 g, Rfl: 0    ondansetron (ZOFRAN) 4 MG tablet, Take 1 tablet (4 mg total) by mouth every 8 (eight) hours as needed for Nausea., Disp: 12 tablet, Rfl: 0    pravastatin (PRAVACHOL) 80 MG tablet, Take 80 mg by mouth once daily., Disp: , Rfl:     sildenafiL (VIAGRA) 100 MG tablet, TAKE ONE-HALF TABLET BY MOUTH EVERY DAY 30 TO 60 MINUTES BEFORE INTERCOURSE FOR BEST RESULTS TAKE ON EMPTY STOMACH, Disp: , Rfl:     docusate sodium (COLACE) 100 MG capsule, Take 1 capsule (100 mg total) by mouth 2 (two) times daily as needed for Constipation., Disp: 20 capsule, Rfl: 0    meclizine (ANTIVERT) 25 mg tablet, , Disp: , Rfl: 3    oxyCODONE-acetaminophen (PERCOCET)  mg per tablet, Take 1 tablet by mouth every 4-6 hours as needed for pain. Take stool softener with this medication., Disp: 21 tablet, Rfl: 0    promethazine (PHENERGAN) 25 MG tablet, Take 1 tablet (25 mg total) by mouth every 6 (six) hours as needed for Nausea., Disp: 8 tablet, Rfl: 0    traMADoL (ULTRAM) 50 mg tablet, Take 1-2 tablets ( mg total) by mouth every 6 (six) hours as needed for Pain., Disp: 21 tablet, Rfl: 0  ALLERGIES:   Review of patient's allergies indicates:   Allergen Reactions    Meloxicam Other (See Comments)     Drove blood pressure james high per pt       VITAL SIGNS: BP (!) 162/76   Pulse 74   Wt 76 kg (167 lb 8.8 oz)   BMI 24.04 kg/m²      Risks, indications and benefits of the surgical procedure were discussed with the patient. All questions with regard to surgery, rehab, expected return to functional activities, activities of daily living and recreational endeavors were answered to his satisfaction.    It was explained to the patient that there may be an increase in surgical risks if the patient has certain co-morbidities such as but not limited to: Obesity, Cardiovascular issues (CHF, CAD, Arrhythmias), chronic pulmonary issues, previous or current neurovascular/neurological issues, previous strokes,  diabetes mellitus, previous wound healing issues, previous wound or skin infections, PVD, clotting disorders, if the patient uses chronic steroids, if the patient takes or has immune compromising medications or diseases, or has previously or currently used tobacco products.     The patient verbalized that he/she does not have any additional clotting, bleeding, or blood disorders, other than what is list in her chart on today's review.     Then a brief history and physical exam were performed.    Review of Systems   Constitution: Negative. Negative for chills, fever and night sweats.   HENT: Negative for congestion and headaches.    Eyes: Negative for blurred vision, left vision loss and right vision loss.   Cardiovascular: Negative for chest pain and syncope.   Respiratory: Negative for cough and shortness of breath.    Endocrine: Negative for polydipsia, polyphagia and polyuria.   Hematologic/Lymphatic: Negative for bleeding problem. Does not bruise/bleed easily.   Skin: Negative for dry skin, itching and rash.   Musculoskeletal: Negative for falls and muscle weakness.   Gastrointestinal: Negative for abdominal pain and bowel incontinence.   Genitourinary: Negative for bladder incontinence and nocturia.   Neurological: Negative for disturbances in coordination, loss of balance and seizures.   Psychiatric/Behavioral: Negative for depression. The patient does not have insomnia.    Allergic/Immunologic: Negative for hives and persistent infections.     PHYSICAL EXAM:  GEN: A&Ox3, WD WN NAD  HEENT: WNL  CHEST: CTAB, no W/R/R  HEART: RRR, no M/R/G  ABD: Soft, NT ND, BS x4 QUADS  MS; See Epic  NEURO: CN II-XII intact       The surgical consent was then reviewed with the patient, who agreed with all the contents of the consent form and it was signed. he was then given the surgery packet to bring with him to surgery center for the anesthesia portion of his perioperative paperwork (if needed)   For all physicians except for  Dr. Nova, we will email and possibly fax the consent forms and booking sheets to [facility].    The patient was given the opportunity to ask questions about the surgical plan and consent form, and once no other questions were asked, I proceeded with the pre-op appointment.    PHYSICAL THERAPY:  He was also instructed regarding physical therapy and will begin on 10 days post-op He was given a copy of the original prescription to schedule. Another copy of this prescription was also faxed to [facility].    POST OP CARE:instructions were reviewed including care of the wound and dressing after surgery and when he can shower. Patient was told not sleep or lay on there surgical extremity following surgery as this could cause repair damage, tissue damage, or nerve injury.    An extensive amount of time was spent on discussion of the following information based on what type of surgery the patient was having. Patient expressed understanding of the material below:    Shoulder surgery or upper extremity surgery requiring post-op sling:  It was explained to the patient that they should remove their arm from the sling approximately 6 times per day to do full elbow ROM (flexion and extension) and full supination and pronation of the elbow for approximately 5 minutes at a time to help prevent elbow stiffness, nerve pain or problems, or nerve injury. They were told to contact us if they begin having numbness and tingling of there surgical extremity that persists longer then 1 day without relief.     Extremity surgery requiring a splint:   It was explained to patient on how to properly elevated position there extremity to prevent pressure ulcers from occurring. I made sure that the patient understood that that surgical site may be numb following surgery and prevent them from feeling pressure pain that they would normal feel if a pressure injury was occurring. Pressure ulcers and there causes were discussed with the  patient today.     Post-operative splint:  It was explain to the patient that they can contact us at anytime if they feel that there is a problem with their splint or under their splint that needs evaluation. If there is concern, questions, or discomfort with the splint then they can present to either our clinic or the [facility] ED for removal, evaluation, and replacement of the splint.    CRUTCHES OR WALKER: It was explained to the patient that if they are having a lower extremity surgery that they will require either a walker or crutches to ambulate safely with after surgery. It was explained that a cane or other assistive devices are not sufficient to safely ambulate with after surgery. I explained to the patient that I will place an order for them to receive either crutches or a walker after surgery to go home with. It was explained that if they have crutches or a walker at home already, that they are REQUIRED to bring them to the hospital on the day of surgery. It was explained that if they do not have them at the hospital on the day of surgery that they WILL be provided a new pair or crutches or a walker to go home with to ensure ambulation will be safe if the patient needs to stop somewhere on the way home.      If the patient's mobility limitation cannot be sufficiently resolved by the use of crutches then it is necessary for the patient's functional mobility deficit to be sufficiently resolved with the use of a (Rolling Walker or Walker). Patient's mobility limitation significantly impairs their ability to participate in one of more activities of daily living. The use of a (Rolling Walker or Walker) will significantly improve the patient's ability to participate in MRADLS and the patient will use it on regular basis in the home.      PAIN MANAGEMENT: Marcos Barfield was also given a pain management regime, which includes the option of getting a TENS unit given to him by [facility] (if patient  chooses) along with the education required for its use. He was also instructed regarding the Polar ice unit or gel ice packs (chosen by patient) that will be in place after surgery and his postoperative pain medications.     Patient understands that Polar Ice machine has to be bought today if they want it. It cannot be bought on day of surgery at surgery center.     PAIN MEDICATION:  Percocet 10/325mg 1 po q 4-6 hours prn pain  Ultram 50 mg Take 1-2 p.o. q.6 hours p.r.n. breakthrough pain,   Phenergan 25 mg one p.o. q.6 hours p.r.n. nausea and vomiting.    DVT prophylaxis was discussed with the patient today including risk factors for developing DVTs and history of DVTs. The patient was asked if any specific recommendations were given from the doctor/s that did pre-operative surgical clearance. The patient was then given an education sheet about DVTs and PE with warning signs and symptoms of both and steps to take if they suspect either of these.    This along with the Modified Caprini risk assessment model for VTE in general surgical patients was used to determine the patient's DVT risk.     From: Iman MK, Santiago DA, Evelyn SM, et al. Prevention of VTE in nonorthopedic surgical patients: antithrombotic therapy and prevention of thrombosis, 9th ed: American College of Chest [employer]icians evidence-based clinical practical guidelines. Chest 2012; 141:e227S. Copyright © 2012. Reproduced with permission from the American College of Chest Physicians.    The below listed DVT prophylaxis regimen along with bilateral ILIA compression stockings will be used post-op. Length of treatment has been determined to be 10-42 days post-op by the above noted Caprini assessment model.     The patient was instructed to buy and take:  Aspirin 81mg QD x 4 weeks for DVT prophylaxis starting on the morning after surgery.  Patient will also use bilateral TEDs on lower extremities, SCDs during surgery, and early ambulation post-op. If the patient  was previously taking 81mg baby aspirin, they were told to not take it starting 5 days prior to surgery and to restart the 81mg aspirin after surgery.       Patient was also told to buy over the counter Prilosec medication if needed and take it once daily for GI protection as long as they are taking NSAIDs or Aspirin.   Patient denies history of seizures.      The patient was told that narcotic pain medications may make them drowsy and instructions were given to not sign legal documents, drive or operate heavy machinery, cars, or equipment while under the influence of narcotic medications. The patient was told and understands that narcotic pain medications should only be used as needed to control pain and that other options of pain control include TENs unit and ice packs/unit.     As there were no other questions to be asked, he was given my business card along with Kayleen Sherman MD business card if he has any questions or concerns prior to surgery or in the postop period.

## 2024-03-05 ENCOUNTER — OFFICE VISIT (OUTPATIENT)
Dept: INTERNAL MEDICINE | Facility: CLINIC | Age: 74
End: 2024-03-05
Payer: MEDICARE

## 2024-03-05 VITALS
OXYGEN SATURATION: 98 % | SYSTOLIC BLOOD PRESSURE: 156 MMHG | DIASTOLIC BLOOD PRESSURE: 84 MMHG | HEART RATE: 79 BPM | HEIGHT: 70 IN | WEIGHT: 167.31 LBS | BODY MASS INDEX: 23.95 KG/M2

## 2024-03-05 DIAGNOSIS — N18.9 CHRONIC KIDNEY DISEASE, UNSPECIFIED CKD STAGE: ICD-10-CM

## 2024-03-05 DIAGNOSIS — M67.912 ROTATOR CUFF DISORDER, LEFT: ICD-10-CM

## 2024-03-05 DIAGNOSIS — Z01.818 PRE-OP EVALUATION: Primary | ICD-10-CM

## 2024-03-05 DIAGNOSIS — I10 HYPERTENSION, UNSPECIFIED TYPE: ICD-10-CM

## 2024-03-05 PROCEDURE — 3077F SYST BP >= 140 MM HG: CPT | Mod: CPTII,GC,S$GLB,

## 2024-03-05 PROCEDURE — 99999 PR PBB SHADOW E&M-EST. PATIENT-LVL III: CPT | Mod: PBBFAC,GC,,

## 2024-03-05 PROCEDURE — 3288F FALL RISK ASSESSMENT DOCD: CPT | Mod: CPTII,GC,S$GLB,

## 2024-03-05 PROCEDURE — 99213 OFFICE O/P EST LOW 20 MIN: CPT | Mod: GC,S$GLB,,

## 2024-03-05 PROCEDURE — 1101F PT FALLS ASSESS-DOCD LE1/YR: CPT | Mod: CPTII,GC,S$GLB,

## 2024-03-05 PROCEDURE — 3079F DIAST BP 80-89 MM HG: CPT | Mod: CPTII,GC,S$GLB,

## 2024-03-05 PROCEDURE — 1125F AMNT PAIN NOTED PAIN PRSNT: CPT | Mod: CPTII,GC,S$GLB,

## 2024-03-05 NOTE — PROGRESS NOTES
Clinic Note  3/5/2024      Subjective:       Patient ID:  Marcos is a 74 y.o. male being seen for a Pre-Op evaluation.    Chief Complaint: Pre-op Exam    74-year-old male with medical history significant for hypertension, HLD, T2DM on insulin and CKD who presents to resident clinic today for pre-op evaluation.  He denies any complaints at this time. He has a history of right rotator cuff disorders and is status post intervention. He has a scheduled surgical intervention for his left rotator cuff and would like preoperative examination. Patient requests EKG and blood work per surgeon. Most recent available creatinine is 1.8 from  lab work. A1c noted to be approximately 8.1. He denies chest pain, headaches, nausea or vomiting. Denies history of cardiac pathology or surgical complication.    Review of Systems   Constitutional:  Negative for fever.   HENT:  Negative for sore throat.    Eyes:  Negative for pain.   Respiratory:  Negative for shortness of breath.    Cardiovascular:  Negative for chest pain.   Gastrointestinal:  Negative for abdominal pain.   Genitourinary:  Negative for dysuria.   Musculoskeletal:  Positive for joint pain. Negative for myalgias.   Skin:  Negative for rash.   Neurological:  Negative for headaches.   Psychiatric/Behavioral:  The patient is not nervous/anxious.        Past Medical History:   Diagnosis Date    Anemia     kid     CKD (chronic kidney disease)     Diabetes mellitus     High cholesterol     Hypertension         reports that he has never smoked. He has never used smokeless tobacco. He reports that he does not drink alcohol and does not use drugs.    Medication List with Changes/Refills   Current Medications    ASCORBIC ACID-BIOFLAVONOIDS 200-300 MG TAB    TAKE  BY MOUTH    ASPIRIN (ECOTRIN) 81 MG EC TABLET    Take 81 mg by mouth once daily.      BETA-CAROTENE,A,-VITS C,E/MINS (OCUVITE ORAL)    Take 1 tablet by mouth 2 (two) times a day.    CYANOCOBALAMIN (VITAMIN B-12)  1000 MCG TABLET    TAKE ONE TABLET BY MOUTH EVERY DAY FOR VITAMIN DEFICIENCIES    DOCUSATE SODIUM (COLACE) 100 MG CAPSULE    Take 1 capsule (100 mg total) by mouth 2 (two) times daily as needed for Constipation.    FLUTICASONE (FLONASE) 50 MCG/ACTUATION NASAL SPRAY        GABAPENTIN (NEURONTIN) 300 MG CAPSULE    Take 300 mg by mouth 3 (three) times daily.    INSULIN ASPART U-100 (NOVOLOG) 100 UNIT/ML (3 ML) INPN PEN    INJECT 5 UNITS SUBCUTANEOUSLY WITH SUPPER FOR DIABETES  WITH FIRST BITE OF MAIN MEAL..   DONOTINJECT ASPART AT BEDTIME. DO NOT INJECT ASPART IF YOU ARE SKIPPING  THE ASSOCIATED MEAL. FOR DIABETES  WITH FIRST BITE OF MAIN MEAL..    DONOTINJECT ASPART AT BEDTIME. DO NOT INJECT ASPART IF YOU ARE SKIPPING   THE ASSOCIATED MEAL.    INSULIN GLARGINE (LANTUS) 100 UNIT/ML INJECTION    Inject 32 Units into the skin every evening.      LISINOPRIL (PRINIVIL,ZESTRIL) 20 MG TABLET    Take 20 mg by mouth once daily.    LISINOPRIL (PRINIVIL,ZESTRIL) 40 MG TABLET    TAKE ONE-HALF TABLET BY MOUTH QD FOR HEART/BLOOD PRESSURE    LISINOPRIL 10 MG TABLET    Take 10 mg by mouth.    MECLIZINE (ANTIVERT) 25 MG TABLET        METFORMIN (GLUMETZA) 1000 MG (MOD) 24HR TABLET    Take 1,000 mg by mouth.    MUPIROCIN (BACTROBAN) 2 % OINTMENT    Apply topically 2 (two) times daily.    ONDANSETRON (ZOFRAN) 4 MG TABLET    Take 1 tablet (4 mg total) by mouth every 8 (eight) hours as needed for Nausea.    OXYCODONE-ACETAMINOPHEN (PERCOCET)  MG PER TABLET    Take 1 tablet by mouth every 4-6 hours as needed for pain. Take stool softener with this medication.    PRAVASTATIN (PRAVACHOL) 80 MG TABLET    Take 80 mg by mouth once daily.    PROMETHAZINE (PHENERGAN) 25 MG TABLET    Take 1 tablet (25 mg total) by mouth every 6 (six) hours as needed for Nausea.    SILDENAFIL (VIAGRA) 100 MG TABLET    TAKE ONE-HALF TABLET BY MOUTH EVERY DAY 30 TO 60 MINUTES BEFORE INTERCOURSE FOR BEST RESULTS TAKE ON EMPTY STOMACH    TRAMADOL (ULTRAM) 50 MG  "TABLET    Take 1-2 tablets ( mg total) by mouth every 6 (six) hours as needed for Pain.     Review of patient's allergies indicates:   Allergen Reactions    Meloxicam Other (See Comments)     Drove blood pressure james high per pt       Patient Active Problem List   Diagnosis    Right shoulder pain    Rotator cuff syndrome of right shoulder    Traumatic tear of right rotator cuff    Decreased range of motion (ROM) of shoulder    Weakness of shoulder    Left shoulder pain           Objective:      BP (!) 156/84 (BP Location: Left arm, Patient Position: Sitting, BP Method: Medium (Manual))   Pulse 79   Ht 5' 10" (1.778 m)   Wt 75.9 kg (167 lb 5.3 oz)   SpO2 98%   BMI 24.01 kg/m²   Estimated body mass index is 24.01 kg/m² as calculated from the following:    Height as of this encounter: 5' 10" (1.778 m).    Weight as of this encounter: 75.9 kg (167 lb 5.3 oz).    Physical Exam  Constitutional:       Appearance: Normal appearance. He is not ill-appearing.   HENT:      Head: Normocephalic and atraumatic.      Nose: No rhinorrhea.      Mouth/Throat:      Mouth: Mucous membranes are moist.   Eyes:      Pupils: Pupils are equal, round, and reactive to light.   Cardiovascular:      Rate and Rhythm: Normal rate.   Pulmonary:      Effort: Pulmonary effort is normal.      Breath sounds: Normal breath sounds.   Abdominal:      General: Abdomen is flat. There is no distension.      Palpations: Abdomen is soft.      Tenderness: There is no abdominal tenderness.   Musculoskeletal:         General: No swelling. Normal range of motion.      Cervical back: Normal range of motion.   Skin:     General: Skin is warm and dry.   Neurological:      General: No focal deficit present.      Mental Status: He is alert.   Psychiatric:         Mood and Affect: Mood normal.       Assessment and Plan:         Marcos was seen today for pre-op exam.    Diagnoses and all orders for this visit:    Pre-op evaluation  -     CBC W/ AUTO " DIFFERENTIAL; Future  -     COMPREHENSIVE METABOLIC PANEL; Future  -     EKG 12-lead; Future    Rotator cuff disorder, left    Hypertension, unspecified type    Chronic kidney disease, unspecified CKD stage  -     PROTIME-INR; Future      Surgical Risk Assessment   Active cardiac issues:  Active decompensated heart failure? No   Unstable angina?  No   Significant uncontrolled arrhythmias? No   Severe valvular heart disease-Aortic or Mitral Stenosis? No   Recent MI or coronary revascularization < 30 days? No     Cardiac Risk Factors  History of CAD/ischemic heart disease? No   History of cerebrovascular disease? No   History of compensated heart failure? No   Type 2 diabetes requiring insulin? Yes   Serum Creatinine > 2? No   Total cardiac risk factors 1     Functional mets > 4    < 4* METs -unable to walk > 2 blocks on level ground without stopping due to symptoms  - eating, dressing, toileting, walking indoors, light housework. POOR   > 4* METs -climbing > 1 flight of stairs without stopping  -walking up hill > 1-2 blocks  -scrubbing floors  -moving furniture  - golf, bowling, dancing or tennis  -running short distance MODERATE to EXCELLENT   * performance of any one of the activities     Assessment/Plan:   Cardiovascular Risk Assessment:  Non-emergent surgery.  No active cardiac problems (such as unstable angina, decompensated heart failure, significant uncontrolled arrhythmias or severe valvular disease).  Surgery is intermediate risk  Functional Status: able to climb a flight of stairs (> 4 METS)    Revised Cardiac Risk Index   1. History of ischemic heart disease   2. History of congestive heart failure   3. History of cerebrovascular disease (stroke or transient ischemic attack)   4. History of diabetes requiring preoperative insulin use   5. Chronic kidney disease (creatinine > 2 mg/dL)   6. Undergoing suprainguinal vascular, intraperitoneal, or intrathoracic surgery Risk for cardiac death, nonfatal  myocardial infarction, and nonfatal cardiac arrest     0 predictors = 0.4%, 1 predictor = 0.9%, 2 predictors = 6.6%, ?3 predictors = >11%    RCRI Calculator Class and Risk percentage:      0.4%           Recommendation:  1. Proceed to Surgery.          Recommendation is made at this time using available medical history and laboratory evaluations. If present testing reveals significant discrepancy, RCRI will be addended as needed.     Follow up in about 3 months (around 6/5/2024). - for care establishment    Of note, he was mildly hypertensive today which he reports is likely due to recent stressors associated with caring for his elderly mom. We discussed likely increasing his home lisinopril 20 mg to 40 mg AFTER surgical procedure so as not to induce short term elevation and creatinine.        Mesfin Sherman MD  Internal Medicine, PGY-2  [facility]    Discussed with Dr. Victor

## 2024-03-06 ENCOUNTER — LAB VISIT (OUTPATIENT)
Dept: LAB | Facility: HOSPITAL | Age: 74
End: 2024-03-06
Payer: MEDICARE

## 2024-03-06 ENCOUNTER — HOSPITAL ENCOUNTER (OUTPATIENT)
Dept: CARDIOLOGY | Facility: CLINIC | Age: 74
Discharge: HOME | End: 2024-03-06
Payer: MEDICARE

## 2024-03-06 ENCOUNTER — PATIENT MESSAGE (OUTPATIENT)
Dept: PREADMISSION TESTING | Facility: HOSPITAL | Age: 74
End: 2024-03-06
Payer: MEDICARE

## 2024-03-06 DIAGNOSIS — Z01.818 PRE-OP EVALUATION: ICD-10-CM

## 2024-03-06 LAB
ALBUMIN SERPL BCP-MCNC: 3.9 G/DL (ref 3.5–5.2)
ALP SERPL-CCNC: 76 U/L (ref 55–135)
ALT SERPL W/O P-5'-P-CCNC: 23 U/L (ref 10–44)
ANION GAP SERPL CALC-SCNC: 7 MMOL/L (ref 8–16)
AST SERPL-CCNC: 21 U/L (ref 10–40)
BASOPHILS # BLD AUTO: 0.05 K/UL (ref 0–0.2)
BASOPHILS NFR BLD: 0.8 % (ref 0–1.9)
BILIRUB SERPL-MCNC: 0.4 MG/DL (ref 0.1–1)
BUN SERPL-MCNC: 28 MG/DL (ref 8–23)
CALCIUM SERPL-MCNC: 9.5 MG/DL (ref 8.7–10.5)
CHLORIDE SERPL-SCNC: 106 MMOL/L (ref 95–110)
CO2 SERPL-SCNC: 29 MMOL/L (ref 23–29)
CREAT SERPL-MCNC: 1.7 MG/DL (ref 0.5–1.4)
DIFFERENTIAL METHOD BLD: ABNORMAL
EOSINOPHIL # BLD AUTO: 0.2 K/UL (ref 0–0.5)
EOSINOPHIL NFR BLD: 3.2 % (ref 0–8)
ERYTHROCYTE [DISTWIDTH] IN BLOOD BY AUTOMATED COUNT: 13.4 % (ref 11.5–14.5)
EST. GFR  (NO RACE VARIABLE): 41.3 ML/MIN/1.73 M^2
GLUCOSE SERPL-MCNC: 76 MG/DL (ref 70–110)
HCT VFR BLD AUTO: 38.9 % (ref 40–54)
HGB BLD-MCNC: 12.7 G/DL (ref 14–18)
IMM GRANULOCYTES # BLD AUTO: 0.03 K/UL (ref 0–0.04)
IMM GRANULOCYTES NFR BLD AUTO: 0.5 % (ref 0–0.5)
LYMPHOCYTES # BLD AUTO: 1 K/UL (ref 1–4.8)
LYMPHOCYTES NFR BLD: 17 % (ref 18–48)
MCH RBC QN AUTO: 29.4 PG (ref 27–31)
MCHC RBC AUTO-ENTMCNC: 32.6 G/DL (ref 32–36)
MCV RBC AUTO: 90 FL (ref 82–98)
MONOCYTES # BLD AUTO: 0.6 K/UL (ref 0.3–1)
MONOCYTES NFR BLD: 9.4 % (ref 4–15)
NEUTROPHILS # BLD AUTO: 4.1 K/UL (ref 1.8–7.7)
NEUTROPHILS NFR BLD: 69.1 % (ref 38–73)
NRBC BLD-RTO: 0 /100 WBC
OHS QRS DURATION: 154 MS
OHS QTC CALCULATION: 434 MS
PLATELET # BLD AUTO: 215 K/UL (ref 150–450)
PMV BLD AUTO: 11.1 FL (ref 9.2–12.9)
POTASSIUM SERPL-SCNC: 5.4 MMOL/L (ref 3.5–5.1)
PROT SERPL-MCNC: 6.7 G/DL (ref 6–8.4)
RBC # BLD AUTO: 4.32 M/UL (ref 4.6–6.2)
SODIUM SERPL-SCNC: 142 MMOL/L (ref 136–145)
WBC # BLD AUTO: 5.93 K/UL (ref 3.9–12.7)

## 2024-03-06 PROCEDURE — 85025 COMPLETE CBC W/AUTO DIFF WBC: CPT

## 2024-03-06 PROCEDURE — 93005 ELECTROCARDIOGRAM TRACING: CPT | Mod: S$GLB,,,

## 2024-03-06 PROCEDURE — 80053 COMPREHEN METABOLIC PANEL: CPT

## 2024-03-06 PROCEDURE — 36415 COLL VENOUS BLD VENIPUNCTURE: CPT

## 2024-03-06 PROCEDURE — 93010 ELECTROCARDIOGRAM REPORT: CPT | Mod: S$GLB,,, | Performed by: INTERNAL MEDICINE

## 2024-03-06 NOTE — PROGRESS NOTES
I have reviewed the notes, assessments, and/or procedures performed this visit, and I concur with the documentation.   27-Aug-2018 11:57

## 2024-03-06 NOTE — ANESTHESIA PAT ROS NOTE
03/06/2024  Marcos Barfield is a 74 y.o., male.      Pre-op Assessment    I have reviewed the Patient Summary Reports.       I have reviewed the Medications.     Review of Systems  Anesthesia Hx:  No problems with previous Anesthesia   History of prior surgery of interest to airway management or planning:  Previous anesthesia: General 8/20/2020 Right Shoulder Arthroscopy, Repair of Rotator Cuff with general anesthesia.  Procedure performed at an [facility].      Airway issues documented on chart review include mask, easy, GETA, easy direct laryngoscopy , view on direct laryngoscopy Grade I      Denies Personal Hx of Anesthesia complications.                    Social:  Non-Smoker, No Alcohol Use       Hematology/Oncology:    Oncology Normal    -- Anemia:               Hematology Comments: H/O anemia as a kid, H&H = 12.7/ 38.9 on 3/6/2024                    EENT/Dental:  chronic allergic rhinitis           Cardiovascular:  Exercise tolerance: good   Hypertension   Denies MI.     Denies CABG/stent.    Denies Angina.     hyperlipidemia  Denies JALLOH.  ECG has been reviewed.                          Pulmonary:  Pulmonary Normal    Denies Asthma.   Denies Shortness of breath.                  Renal/:  Chronic Renal Disease, CKD   H/O Vasectomy, BUN = 28, Creat = 1.7, eGFR = 41.3, K+ = 5.4 on 3/6/2024,  [facility] K+ [facility] 5.1 [facility]             Hepatic/GI:  Hepatic/GI Normal     Denies GERD. Denies Liver Disease.            Musculoskeletal:  Arthritis   Non-traumatic rotator cuff tear, left,  SLAP lesion of left shoulder,  Biceps tendonitis on left,  Closed nondisplaced fracture of greater tuberosity of left humerus,  H/O right Shoulder Arthroscopy, Rotator Cuff Repair, Debridement, Removal of Distal Clavicle, Fixation of Biceps Tendon, ORIF right Humerus Fracture,  Decreased range of motion (ROM) of shoulder,  Weakness  of shoulder,  Left shoulder pain, Cervical spondylosis           Spine Disorders: cervical Degenerative disease and Disc disease           Neurological:  Neurology Normal   Denies CVA.    Denies Headaches. Denies Seizures.                                Endocrine:  Diabetes, well controlled, type 2, using insulin Denies Hypothyroidism.  HA1C =         Dermatological:  H/O Cyst removal from mid-back   Psych:  Psychiatric Normal                   Past Medical History:   Diagnosis Date    Anemia     kid     CKD (chronic kidney disease)     Diabetes mellitus     High cholesterol     Hypertension      Past Surgical History:   Procedure Laterality Date    ARTHROSCOPIC DEBRIDEMENT OF SHOULDER Right 8/20/2020    Procedure: DEBRIDEMENT, SHOULDER, ARTHROSCOPIC;  Surgeon: Kayleen Sherman MD;  Location: [facility] OR;  Service: Orthopedics;  Laterality: Right;    ARTHROSCOPIC REPAIR OF ROTATOR CUFF OF SHOULDER Right 8/20/2020    Procedure: REPAIR, ROTATOR CUFF, ARTHROSCOPIC;  Surgeon: Kayleen Sherman MD;  Location: [facility] OR;  Service: Orthopedics;  Laterality: Right;    ARTHROSCOPY OF SHOULDER WITH REMOVAL OF DISTAL CLAVICLE Right 8/20/2020    Procedure: ARTHROSCOPY, SHOULDER, WITH DISTAL CLAVICLE EXCISION;  Surgeon: Kayleen Sherman MD;  Location: [facility] OR;  Service: Orthopedics;  Laterality: Right;    COLONOSCOPY      x2    CYST REMOVAL      mid back    FIXATION OF TENDON Right 8/20/2020    Procedure: FIXATION, TENDON, Biceps Tenodesis;  Surgeon: Kayleen Sherman MD;  Location: [facility] OR;  Service: Orthopedics;  Laterality: Right;  FIXATION, TENDON, Biceps Tenodesis    ORIF HUMERUS FRACTURE Right 8/20/2020    Procedure: ORIF, FRACTURE, HUMERUS;  Surgeon: Kayleen Sherman MD;  Location: [facility] OR;  Service: Orthopedics;  Laterality: Right;    SHOULDER SURGERY      VASECTOMY         Anesthesia Assessment: Preoperative EQUATION    Planned Procedure: Procedure(s) (LRB):  REPAIR, ROTATOR CUFF, ARTHROSCOPIC (Left)  DEBRIDEMENT, SHOULDER, ARTHROSCOPIC  (Left)  FIXATION, TENDON (Left)  ORIF, FRACTURE, HUMERUS (Left)  REPAIR (Left)  Requested Anesthesia Type:General  Surgeon: Kayleen Sherman MD  Service: Orthopedics  Known or anticipated Date of Surgery:3/12/2024    Surgeon notes: reviewed    Electronic QUestionnaire Assessment completed via nurse interview with patient.        Triage considerations:     The patient has no apparent active cardiac condition (No unstable coronary Syndrome such as severe unstable angina or recent [<1 month] myocardial infarction, decompensated CHF, severe valvular   disease or significant arrhythmia)    Previous anesthesia records:GETA, Easy airway, Easy intubation, and No problems    Last PCP note: within 1 month , within [facility]   Subspecialty notes: n/a    Other important co-morbidities: DM2, GERD, HLD, and HTN       EKG 3/6/2024:  Vent. Rate : 066 BPM     Atrial Rate : 066 BPM      P-R Int : 168 ms          QRS Dur : 154 ms       QT Int : 414 ms       P-R-T Axes : 073 -76 063 degrees      QTc Int : 434 ms   Program found technically poor ECG   Sinus rhythm   Right bundle branch block   Left anterior fascicular block    Bifascicular block   Abnormal ECG   No previous ECGs available   Confirmed by Kathleen Melendrez MD ([id number]) on 3/6/2024 4:59:51 PM        Tests already available:  Results have been reviewed.             Instructions given. (See in Nurse's note)      Optimization:  Anesthesia Preop Clinic Assessment Not Indicated    Medical Opinion Indicated: Yes, PCP       Sub-specialist consult indicated: N/A      Plan:    Consultation:Patient's PCP for a statement of optimization     Patient  has previously scheduled Medical Appointment:    Navigation: No additional tests Scheduled.              Consults scheduled: N/A                        Patient is cleared/ optimized for surgery by PCP.                       Patient is OK to proceed with surgery at [facility].       Ht: 5'10  Wt: 167 lb  BMI: 24.01  Vaccinated

## 2024-03-08 NOTE — PRE-PROCEDURE INSTRUCTIONS
Discussed lab results with staff Anesthesiologist, Dr. Goodrich. Would like to check I-stat K+ level on day of surgery.

## 2024-03-11 ENCOUNTER — TELEPHONE (OUTPATIENT)
Dept: SPORTS MEDICINE | Facility: CLINIC | Age: 74
End: 2024-03-11
Payer: MEDICARE

## 2024-03-12 ENCOUNTER — ANESTHESIA EVENT (OUTPATIENT)
Dept: SURGERY | Facility: HOSPITAL | Age: 74
End: 2024-03-12
Payer: MEDICARE

## 2024-03-12 ENCOUNTER — ANESTHESIA (OUTPATIENT)
Dept: SURGERY | Facility: HOSPITAL | Age: 74
End: 2024-03-12
Payer: MEDICARE

## 2024-03-12 ENCOUNTER — HOSPITAL ENCOUNTER (OUTPATIENT)
Facility: HOSPITAL | Age: 74
Discharge: HOME | End: 2024-03-12
Attending: ORTHOPAEDIC SURGERY | Admitting: ORTHOPAEDIC SURGERY
Payer: MEDICARE

## 2024-03-12 VITALS
SYSTOLIC BLOOD PRESSURE: 169 MMHG | HEIGHT: 70 IN | TEMPERATURE: 99 F | OXYGEN SATURATION: 97 % | WEIGHT: 168 LBS | BODY MASS INDEX: 24.05 KG/M2 | HEART RATE: 56 BPM | DIASTOLIC BLOOD PRESSURE: 79 MMHG | RESPIRATION RATE: 21 BRPM

## 2024-03-12 DIAGNOSIS — M75.102 NON-TRAUMATIC ROTATOR CUFF TEAR, LEFT: ICD-10-CM

## 2024-03-12 LAB
POCT GLUCOSE: 111 MG/DL (ref 70–110)
POCT GLUCOSE: 183 MG/DL (ref 70–110)

## 2024-03-12 PROCEDURE — 63600175 PHARM REV CODE 636 W HCPCS: Performed by: NURSE ANESTHETIST, CERTIFIED REGISTERED

## 2024-03-12 PROCEDURE — C1713 ANCHOR/SCREW BN/BN,TIS/BN: HCPCS | Performed by: ORTHOPAEDIC SURGERY

## 2024-03-12 PROCEDURE — 29828 SHO ARTHRS SRG BICP TENODSIS: CPT | Mod: 52,51,LT, | Performed by: ORTHOPAEDIC SURGERY

## 2024-03-12 PROCEDURE — 25000003 PHARM REV CODE 250: Performed by: PHYSICIAN ASSISTANT

## 2024-03-12 PROCEDURE — 82962 GLUCOSE BLOOD TEST: CPT | Performed by: ORTHOPAEDIC SURGERY

## 2024-03-12 PROCEDURE — D9220A PRA ANESTHESIA: Mod: ANES,,, | Performed by: SURGERY

## 2024-03-12 PROCEDURE — 99900035 HC TECH TIME PER 15 MIN (STAT)

## 2024-03-12 PROCEDURE — 63600175 PHARM REV CODE 636 W HCPCS: Performed by: ORTHOPAEDIC SURGERY

## 2024-03-12 PROCEDURE — D9220A PRA ANESTHESIA: Mod: CRNA,,, | Performed by: NURSE ANESTHETIST, CERTIFIED REGISTERED

## 2024-03-12 PROCEDURE — 71000015 HC POSTOP RECOV 1ST HR: Performed by: ORTHOPAEDIC SURGERY

## 2024-03-12 PROCEDURE — 25000003 PHARM REV CODE 250: Performed by: ANESTHESIOLOGY

## 2024-03-12 PROCEDURE — 71000039 HC RECOVERY, EACH ADD'L HOUR: Performed by: ORTHOPAEDIC SURGERY

## 2024-03-12 PROCEDURE — 25000003 PHARM REV CODE 250: Performed by: NURSE ANESTHETIST, CERTIFIED REGISTERED

## 2024-03-12 PROCEDURE — 27201423 OPTIME MED/SURG SUP & DEVICES STERILE SUPPLY: Performed by: ORTHOPAEDIC SURGERY

## 2024-03-12 PROCEDURE — 36000710: Performed by: ORTHOPAEDIC SURGERY

## 2024-03-12 PROCEDURE — 29827 SHO ARTHRS SRG RT8TR CUF RPR: CPT | Mod: LT,,, | Performed by: ORTHOPAEDIC SURGERY

## 2024-03-12 PROCEDURE — 36000711: Performed by: ORTHOPAEDIC SURGERY

## 2024-03-12 PROCEDURE — 37000008 HC ANESTHESIA 1ST 15 MINUTES: Performed by: ORTHOPAEDIC SURGERY

## 2024-03-12 PROCEDURE — 71000016 HC POSTOP RECOV ADDL HR: Performed by: ORTHOPAEDIC SURGERY

## 2024-03-12 PROCEDURE — 94761 N-INVAS EAR/PLS OXIMETRY MLT: CPT

## 2024-03-12 PROCEDURE — 71000033 HC RECOVERY, INTIAL HOUR: Performed by: ORTHOPAEDIC SURGERY

## 2024-03-12 PROCEDURE — 29826 SHO ARTHRS SRG DECOMPRESSION: CPT | Mod: LT,,, | Performed by: ORTHOPAEDIC SURGERY

## 2024-03-12 PROCEDURE — 63600175 PHARM REV CODE 636 W HCPCS: Mod: JZ,JG | Performed by: ORTHOPAEDIC SURGERY

## 2024-03-12 PROCEDURE — 25000003 PHARM REV CODE 250: Performed by: ORTHOPAEDIC SURGERY

## 2024-03-12 PROCEDURE — 63600175 PHARM REV CODE 636 W HCPCS: Performed by: ANESTHESIOLOGY

## 2024-03-12 PROCEDURE — 37000009 HC ANESTHESIA EA ADD 15 MINS: Performed by: ORTHOPAEDIC SURGERY

## 2024-03-12 DEVICE — PATCH ARTHROFLEX 1.5X20X25MM: Type: IMPLANTABLE DEVICE | Site: SHOULDER | Status: FUNCTIONAL

## 2024-03-12 DEVICE — IMPLANTABLE DEVICE
Type: IMPLANTABLE DEVICE | Site: SHOULDER | Status: FUNCTIONAL
Brand: FIBERSTITCH™ IMPLANT 1.5, STRAIGHT (RC) WITH TWO POLYESTER IMPLANTS AND 2-0 FIBE

## 2024-03-12 DEVICE — ANCHOR SUT FIBERTAK 1.8 KNTLS: Type: IMPLANTABLE DEVICE | Site: SHOULDER | Status: FUNCTIONAL

## 2024-03-12 RX ORDER — ONDANSETRON HYDROCHLORIDE 2 MG/ML
4 INJECTION, SOLUTION INTRAVENOUS EVERY 12 HOURS PRN
Status: DISCONTINUED | OUTPATIENT
Start: 2024-03-12 | End: 2024-03-12 | Stop reason: HOSPADM

## 2024-03-12 RX ORDER — MIDAZOLAM HYDROCHLORIDE 1 MG/ML
INJECTION INTRAMUSCULAR; INTRAVENOUS
Status: DISCONTINUED | OUTPATIENT
Start: 2024-03-12 | End: 2024-03-12

## 2024-03-12 RX ORDER — EPINEPHRINE 1 MG/ML
INJECTION, SOLUTION, CONCENTRATE INTRAVENOUS
Status: DISCONTINUED | OUTPATIENT
Start: 2024-03-12 | End: 2024-03-12 | Stop reason: HOSPADM

## 2024-03-12 RX ORDER — PROMETHAZINE HYDROCHLORIDE 25 MG/1
25 TABLET ORAL EVERY 6 HOURS PRN
Status: DISCONTINUED | OUTPATIENT
Start: 2024-03-12 | End: 2024-03-12 | Stop reason: HOSPADM

## 2024-03-12 RX ORDER — CLINDAMYCIN PHOSPHATE 900 MG/50ML
900 INJECTION, SOLUTION INTRAVENOUS
Status: DISCONTINUED | OUTPATIENT
Start: 2024-03-12 | End: 2024-03-12

## 2024-03-12 RX ORDER — MORPHINE SULFATE 2 MG/ML
2 INJECTION, SOLUTION INTRAMUSCULAR; INTRAVENOUS EVERY 10 MIN PRN
Status: DISCONTINUED | OUTPATIENT
Start: 2024-03-12 | End: 2024-03-12 | Stop reason: HOSPADM

## 2024-03-12 RX ORDER — NEOSTIGMINE METHYLSULFATE 0.5 MG/ML
INJECTION, SOLUTION INTRAVENOUS
Status: DISCONTINUED | OUTPATIENT
Start: 2024-03-12 | End: 2024-03-12

## 2024-03-12 RX ORDER — LISINOPRIL 10 MG/1
20 TABLET ORAL DAILY
Status: DISCONTINUED | OUTPATIENT
Start: 2024-03-12 | End: 2024-03-12 | Stop reason: HOSPADM

## 2024-03-12 RX ORDER — LIDOCAINE HYDROCHLORIDE 20 MG/ML
INJECTION INTRAVENOUS
Status: DISCONTINUED | OUTPATIENT
Start: 2024-03-12 | End: 2024-03-12

## 2024-03-12 RX ORDER — ROCURONIUM BROMIDE 10 MG/ML
INJECTION, SOLUTION INTRAVENOUS
Status: DISCONTINUED | OUTPATIENT
Start: 2024-03-12 | End: 2024-03-12

## 2024-03-12 RX ORDER — OXYCODONE HYDROCHLORIDE 5 MG/1
5 TABLET ORAL
Status: DISCONTINUED | OUTPATIENT
Start: 2024-03-12 | End: 2024-03-12 | Stop reason: HOSPADM

## 2024-03-12 RX ORDER — ROPIVACAINE HYDROCHLORIDE 5 MG/ML
INJECTION, SOLUTION EPIDURAL; INFILTRATION; PERINEURAL
Status: DISCONTINUED | OUTPATIENT
Start: 2024-03-12 | End: 2024-03-12 | Stop reason: HOSPADM

## 2024-03-12 RX ORDER — OXYCODONE HYDROCHLORIDE 5 MG/1
10 TABLET ORAL EVERY 4 HOURS PRN
Status: DISCONTINUED | OUTPATIENT
Start: 2024-03-12 | End: 2024-03-12 | Stop reason: HOSPADM

## 2024-03-12 RX ORDER — DEXAMETHASONE SODIUM PHOSPHATE 4 MG/ML
INJECTION, SOLUTION INTRA-ARTICULAR; INTRALESIONAL; INTRAMUSCULAR; INTRAVENOUS; SOFT TISSUE
Status: DISCONTINUED | OUTPATIENT
Start: 2024-03-12 | End: 2024-03-12

## 2024-03-12 RX ORDER — KETOROLAC TROMETHAMINE 30 MG/ML
INJECTION, SOLUTION INTRAMUSCULAR; INTRAVENOUS
Status: DISCONTINUED | OUTPATIENT
Start: 2024-03-12 | End: 2024-03-12 | Stop reason: HOSPADM

## 2024-03-12 RX ORDER — FENTANYL CITRATE 50 UG/ML
25 INJECTION, SOLUTION INTRAMUSCULAR; INTRAVENOUS EVERY 5 MIN PRN
Status: DISCONTINUED | OUTPATIENT
Start: 2024-03-12 | End: 2024-03-12 | Stop reason: HOSPADM

## 2024-03-12 RX ORDER — KETAMINE HYDROCHLORIDE 100 MG/ML
INJECTION, SOLUTION INTRAMUSCULAR; INTRAVENOUS
Status: DISCONTINUED | OUTPATIENT
Start: 2024-03-12 | End: 2024-03-12 | Stop reason: HOSPADM

## 2024-03-12 RX ORDER — CLINDAMYCIN PHOSPHATE 900 MG/50ML
900 INJECTION, SOLUTION INTRAVENOUS ONCE
Status: COMPLETED | OUTPATIENT
Start: 2024-03-12 | End: 2024-03-12

## 2024-03-12 RX ORDER — PROPOFOL 10 MG/ML
VIAL (ML) INTRAVENOUS
Status: DISCONTINUED | OUTPATIENT
Start: 2024-03-12 | End: 2024-03-12

## 2024-03-12 RX ORDER — TRAMADOL HYDROCHLORIDE 50 MG/1
100 TABLET ORAL EVERY 6 HOURS PRN
Status: DISCONTINUED | OUTPATIENT
Start: 2024-03-12 | End: 2024-03-12 | Stop reason: HOSPADM

## 2024-03-12 RX ORDER — METHOCARBAMOL 500 MG/1
1000 TABLET, FILM COATED ORAL ONCE
Status: COMPLETED | OUTPATIENT
Start: 2024-03-12 | End: 2024-03-12

## 2024-03-12 RX ORDER — EPHEDRINE SULFATE 50 MG/ML
INJECTION, SOLUTION INTRAVENOUS
Status: DISCONTINUED | OUTPATIENT
Start: 2024-03-12 | End: 2024-03-12

## 2024-03-12 RX ORDER — SODIUM CHLORIDE 9 MG/ML
INJECTION, SOLUTION INTRAVENOUS CONTINUOUS
Status: DISCONTINUED | OUTPATIENT
Start: 2024-03-13 | End: 2024-03-12 | Stop reason: HOSPADM

## 2024-03-12 RX ORDER — ONDANSETRON HYDROCHLORIDE 2 MG/ML
INJECTION, SOLUTION INTRAVENOUS
Status: DISCONTINUED | OUTPATIENT
Start: 2024-03-12 | End: 2024-03-12

## 2024-03-12 RX ORDER — FENTANYL CITRATE 50 UG/ML
INJECTION, SOLUTION INTRAMUSCULAR; INTRAVENOUS
Status: DISCONTINUED | OUTPATIENT
Start: 2024-03-12 | End: 2024-03-12

## 2024-03-12 RX ADMIN — LISINOPRIL 20 MG: 10 TABLET ORAL at 02:03

## 2024-03-12 RX ADMIN — SODIUM CHLORIDE: 9 INJECTION, SOLUTION INTRAVENOUS at 07:03

## 2024-03-12 RX ADMIN — MIDAZOLAM HYDROCHLORIDE 2 MG: 2 INJECTION, SOLUTION INTRAMUSCULAR; INTRAVENOUS at 09:03

## 2024-03-12 RX ADMIN — LIDOCAINE HYDROCHLORIDE 60 MG: 20 INJECTION INTRAVENOUS at 09:03

## 2024-03-12 RX ADMIN — SODIUM CHLORIDE: 9 INJECTION, SOLUTION INTRAVENOUS at 10:03

## 2024-03-12 RX ADMIN — FENTANYL CITRATE 25 MCG: 50 INJECTION INTRAMUSCULAR; INTRAVENOUS at 12:03

## 2024-03-12 RX ADMIN — DEXTROSE MONOHYDRATE 2 G: 50 INJECTION, SOLUTION INTRAVENOUS at 09:03

## 2024-03-12 RX ADMIN — CLINDAMYCIN IN 5 PERCENT DEXTROSE 900 MG: 18 INJECTION, SOLUTION INTRAVENOUS at 09:03

## 2024-03-12 RX ADMIN — GLYCOPYRROLATE 0.4 MG: 0.2 INJECTION, SOLUTION INTRAMUSCULAR; INTRAVENOUS at 11:03

## 2024-03-12 RX ADMIN — NEOSTIGMINE METHYLSULFATE 4 MG: 0.5 INJECTION INTRAVENOUS at 11:03

## 2024-03-12 RX ADMIN — PROPOFOL 150 MG: 10 INJECTION, EMULSION INTRAVENOUS at 09:03

## 2024-03-12 RX ADMIN — ONDANSETRON 4 MG: 2 INJECTION INTRAMUSCULAR; INTRAVENOUS at 09:03

## 2024-03-12 RX ADMIN — EPHEDRINE SULFATE 10 MG: 50 INJECTION INTRAVENOUS at 09:03

## 2024-03-12 RX ADMIN — METHOCARBAMOL 1000 MG: 500 TABLET ORAL at 12:03

## 2024-03-12 RX ADMIN — SODIUM CHLORIDE, SODIUM GLUCONATE, SODIUM ACETATE, POTASSIUM CHLORIDE, MAGNESIUM CHLORIDE, SODIUM PHOSPHATE, DIBASIC, AND POTASSIUM PHOSPHATE: .53; .5; .37; .037; .03; .012; .00082 INJECTION, SOLUTION INTRAVENOUS at 10:03

## 2024-03-12 RX ADMIN — DEXAMETHASONE SODIUM PHOSPHATE 8 MG: 4 INJECTION, SOLUTION INTRAMUSCULAR; INTRAVENOUS at 09:03

## 2024-03-12 RX ADMIN — OXYCODONE 5 MG: 5 TABLET ORAL at 12:03

## 2024-03-12 RX ADMIN — ROCURONIUM BROMIDE 50 MG: 10 INJECTION, SOLUTION INTRAVENOUS at 09:03

## 2024-03-12 RX ADMIN — EPHEDRINE SULFATE 10 MG: 50 INJECTION INTRAVENOUS at 10:03

## 2024-03-12 RX ADMIN — FENTANYL CITRATE 50 MCG: 50 INJECTION, SOLUTION INTRAMUSCULAR; INTRAVENOUS at 09:03

## 2024-03-12 NOTE — PROGRESS NOTES
Pts BP elevated to pre-op level of 170s/80s. Pt states he was told not to take his home BP medicine this morning. RA O2 sats 92-97%. HR sustained in the low 50s when awake down to the mid 40s while sleeping. Dr. Wagoner with anesthesia notified and to bedside to assess and speak with pt.

## 2024-03-12 NOTE — ANESTHESIA POSTPROCEDURE EVALUATION
Anesthesia Post Evaluation    Patient: Marcos Barfield    Procedure(s) Performed: Procedure(s) (LRB):  REPAIR, ROTATOR CUFF, ARTHROSCOPIC--with patch (Left)  FIXATION, TENDON--bicep tenodesis (Left)  ARTHROSCOPY, SHOULDER, WITH SUBACROMIAL SPACE DECOMPRESSION (Left)    Final Anesthesia Type: general      Patient location during evaluation: PACU  Patient participation: Yes- Able to Participate  Level of consciousness: awake and alert  Post-procedure vital signs: reviewed and stable  Pain management: adequate  Airway patency: patent  HAILY mitigation strategies: Multimodal analgesia  PONV status at discharge: No PONV  Anesthetic complications: no      Cardiovascular status: blood pressure returned to baseline and hypertensive  Respiratory status: unassisted  Hydration status: euvolemic  Follow-up not needed.              Vitals Value Taken Time   /79 03/12/24 1418   Temp 37.1 °C (98.8 °F) 03/12/24 1137   Pulse 53 03/12/24 1418   Resp 40 03/12/24 1418   SpO2 97 % 03/12/24 1418   Vitals shown include unvalidated device data.      Event Time   Out of Recovery 12:34:00         Pain/Everett Score: Pain Rating Prior to Med Admin: 7 (3/12/2024 12:48 PM)  Pain Rating Post Med Admin: 0 (3/12/2024 11:40 AM)  Everett Score: 10 (3/12/2024  1:10 PM)

## 2024-03-12 NOTE — OP NOTE
DATE OF PROCEDURE: 03/12/2024    SURGEON: Kayleen Sherman M.D.    ASSISTANT: BEN Galvez MD PGY6  ASSISTANT: JAIDEN Grullon PA-C      PREOPERATIVE DIAGNOSES:   left  1. Shoulder rotator cuff tear   2. Shoulder biceps tendonitis  3. Shoulder synovitis  4. Shoulder SLAP  5. Shoulder impingement, bursitis  6. Shoulder adhesions    POSTOPERATIVE DIAGNOSES:   left  1. Shoulder rotator cuff tear   2. Shoulder biceps tendonitis  3. Shoulder synovitis  4. Shoulder SLAP  5. Shoulder impingement, bursitis  6. Shoulder adhesions    OPERATION:   left  1. Shoulder arthroscopic rotator cuff repair (CPT 01679) with CuffMend () (58D40ay 40% partial thickness bursal)  2. Shoulder arthroscopic biceps tenodesis (CPT 68998)  3. Shoulder arthroscopic subacromial decompression, bursectomy   4. Shoulder arthroscopic extensive debridement (anterior, posterior glenohumeral joint, subacromial space) (CPT 10498)  5. Shoulder arthroscopic labral debridement (CPT 91212)  6. Shoulder arthroscopic lysis of adhesions (CPT 90590)    ANESTHESIA:  General with intra-op suprascapular nerve block with local    ESTIMATED BLOOD LOSS:  Minimal.    TOURNIQUET TIME:  None.    DRAINS:  None.    COMPLICATIONS:  None.  The patient was moved to the recovery room in stable condition with compartments soft and cap refill less than a second in all digits.    I was physically present during the critical/key portion(s) of the procedure.    INDICATIONS/MEDICAL NECESSITY: The patient is a 74 y.o. year-old male who has history and physical examination findings consistent with the above. Preoperative studies revealed Tear, Rotator Cuff, traumatic S46.019A.  Patient was noted to have problems along the anterior and superior rotator cuff structures. The patient had clinical evidence of weakness and pain with overhead maneuvers. MRI was obtained revealing evidence of rotator cuff damage, which was consistent with the above stated preoperative diagnoses.   Nonoperative versus  operative options were discussed.  The risks and benefits were discussed with the patient.  The patient acknowledged understanding and wished to proceed with operative intervention.  Informed consent was obtained prior to the procedure.  Reasonable expectations and potential complications were discussed and acknowledged, including but not limited to infection, bleeding, blood clots, (DVT and/or PE), nerve injury, tear, instability, continued pain and stiffness.  They agreed and understood and wished to proceed.    EXAMINATION UNDER ANESTHESIA of the right SHOULDER: Forward elevation 175 degrees, External rotation at 0 60 degrees, External rotation at 90 90 degrees, Internal rotation at 90 60 degrees.  Translation testing: anterior grade 1, posterior grade 1, sulcus sign grade 1 corrects to 0 on external rotation.    EXAMINATION UNDER ANESTHESIA of the left SHOULDER: Forward elevation 175 degrees, External rotation at 0 60 degrees, External rotation at 90 90 degrees, Internal rotation at 90 60 degrees.  Translation testing: anterior grade 1, posterior grade 1, sulcus sign grade 1 corrects to 0 on external rotation.    PROCEDURE IN DETAIL:  After the correct operative site was marked by the operating surgeon. The patient was then taken to the operating room and placed supine on the operating room table, where the patient  underwent general anesthesia by the anesthesia team.  The patient was then rolled into the lateral decubitus position with the operative side up.  A well-padded axillary roll, beanbag and pillows were placed.  All pressure points were carefully padded and checked.  The upper extremities and both lower extremities were placed in comfortable positions and were also well-padded.  The operative upper extremity was then prepped and draped in the usual sterile fashion.    Suprascapular nerve block was performed in the standard fashion with 5cc local anesthetic.    The Spider arm positioner was implemented  with balanced suspension and appropriate landmarks were noted on the skin.  A posterior followed by paul-superior portals were created and systematic examination of the joint revealed the following:      There was no evidence of any significant chondral lesions to the glenoid or humeral head.     Tenosynovitis and SLAP type II was noted to the biceps tendon on ramp sign.    IGHL 13    Partial thickness 40% cuff tearing on bursal side was debrided with shaver and gently with thermal device.    There was some synovitis and tearing to the labrum that was debrided as needed superiorly and anteriorly.  Biceps auto-tenodesis was performed using thermal device.  Part of superior labrum was included to allow the biceps tendon to auto-tenodese.  Attention was then turned to the rotator cuff.  The rotator cuff undersurface was then visualized and debrided as needed using a shaver.     Attention was then turned to the subacromial space where a significant hypertrophic bursa was encountered.  The bursa itself was thickened and hypertrophic.  A lateral portal was created to assist with the bursectomy.  Subacromial decompression was completed using three-portal technique in the standard fashion with a 4.5 mm hellen without difficulty.  The anterior osteophyte was flattened.  Confirmation of adequate resection was confirmed while viewing from the lateral portal.      Shoulder arthroscopic lysis of adhesions (CPT 58442):  With the arthroscope in the subacromial space, an extensive lysis of adhesions needed to be performed with lysis of adhesions in the lateral gutter, posterior gutter, and anterior gutter where there was extensive scarring from the chronic nature of the rotator cuff tear.  After the lysis of adhesions, the mobility was restored to the rotator cuff tissue and shoulder.    The surface of the rotator cuff was then gently debrided and mobilized.  We did this using a shaver while viewing through the posterior portal  and the shaver used through the lateral portal.  We were then able to mobilize the cuff tear which was located at the supraspinatus extending to the infraspinatus.  Rotator cuff was inspected the rotator cuff from the subacromial side.There was a clearly demonstrated rotator cuff tear with size and pattern as reported. The rotator cuff was mobilized on its superficial and deep surfaces to allow maximal placement over the anatomic footprint of the greater tuberosity. The rotator cuff was mobilized on its superficial and deep surfaces to allow maximal placement over the anatomic footprint of the greater tuberosity.     Passport cannula was placed into the widened lateral portal and the Cuffmend device was introduced and deployed to repair and augment and reinforce the compromised torn rotator cuff tissue.  Fiberstitch RC 1.5 x 2 were placed to stabilize the graft to the native tendon under direct Nanoscope visualization posteriorly.  2 1.8 Fibertaks were placed, anteriorly and posteriorly without excess tension securing the graft down laterally    Suprascapular nerve block was performed in the standard fashion with 5cc local anesthetic, and 5cc subacromially for local.  Local was placed about portals and incision(s) after irrigation, as described below, was completed. The shoulder and subacromial space were then irrigated and fluid was extravasated using suction. All portals were reapproximated using inverted 4-0 Monocryl suture in the subcutaneous tissue of the portals. Mastisol and Steri-strips were placed with xeroform, 4x4s, abd pad, and Medi-pore tape.  TENS unit pads were placed which were medically necessary for pain relief.  An iceman was secured in the shoulder zavala.  A sling with an abduction pillow was secured.  The patient was then moved to supine, extubated and taken to the recovery room where the patient arrived in stable condition with the compartments of the arm and forearm soft and cap refill less  than a second in all digits.     POSTOPERATIVE PLAN: We will follow the arthroscopic rotator cuff repair guidelines for a small size rotator cuff tear.  We discussed with the patient's family after surgery.  The patient will remain in a sling for 6 weeks.  PT to start at 1-2 weeks.    Cuff specific program:  Pendulum exercises and Codman's exercises in 5-7 days, protecting rotator cuff repair for 1 week by avoiding active motion program until 1 week.     PASSIVE ROM: ER side 30 degrees, Forward Flex 90 degrees, ABD - 60 degrees   Full AAROM/PROM starting at 5-7 days as tolerated       Quality of tissue: fair     Quality of the repair: good      Size of tear: 10 x 20 mm, 40% partial thickness bursal

## 2024-03-12 NOTE — PLAN OF CARE
Patient is AAO and VSS.  Tolerating PO and states pain is tolerable.  Dressing CDI.  Patient states they are ready for d/c.  IV removed.  Catheter tip intact.  Caregiver at bedside.  Discharge instructions reviewed and copy given to the patient and caregiver.  Questions answered.  Both verbalized understanding.  Medication delivered to bedside.  Patient wheeled to car by RN with NAD noted .

## 2024-03-12 NOTE — ANESTHESIA PROCEDURE NOTES
Intubation    Date/Time: 3/12/2024 9:25 AM    Performed by: Ketty Wade CRNA  Authorized by: Nadeem Alaniz MD    Intubation:     Induction:  Intravenous    Intubated:  Postinduction    Mask Ventilation:  Easy mask    Attempts:  1    Attempted By:  CRNA    Method of Intubation:  Video laryngoscopy    Blade:  Souza 3    Laryngeal View Grade: Grade I - full view of cords      Difficult Airway Encountered?: No      Complications:  None    Airway Device:  Oral endotracheal tube    Airway Device Size:  7.5    Style/Cuff Inflation:  Cuffed    Secured at:  The lips    Placement Verified By:  Capnometry    Complicating Factors:  None    Findings Post-Intubation:  BS equal bilateral and atraumatic/condition of teeth unchanged

## 2024-03-12 NOTE — TRANSFER OF CARE
"Anesthesia Transfer of Care Note    Patient: Marcos Barfield    Procedure(s) Performed: Procedure(s) (LRB):  REPAIR, ROTATOR CUFF, ARTHROSCOPIC (Left)  DEBRIDEMENT, SHOULDER, ARTHROSCOPIC (Left)  FIXATION, TENDON (Left)  ORIF, FRACTURE, HUMERUS (Left)  REPAIR (Left)    Patient location: PACU    Anesthesia Type: general    Transport from OR: Transported from OR on 6-10 L/min O2 by face mask with adequate spontaneous ventilation    Post pain: adequate analgesia    Post assessment: no apparent anesthetic complications and tolerated procedure well    Post vital signs: stable    Level of consciousness: awake, alert and oriented    Nausea/Vomiting: no nausea/vomiting    Complications: none    Transfer of care protocol was followed      Last vitals: Visit Vitals  BP (!) 174/88   Pulse 66   Temp 37.1 °C (98.8 °F) (Temporal)   Resp 16   Ht 5' 10" (1.778 m)   Wt 76.2 kg (168 lb)   SpO2 100%   BMI 24.11 kg/m²     "

## 2024-03-12 NOTE — BRIEF OP NOTE
"[place] - Surgery ([facility])  Brief Operative Note    Surgery Date: 3/12/2024     Surgeon(s) and Role:     * Kayleen Sherman MD - Primary    Assisting Surgeon: None    Pre-op Diagnosis:  Non-traumatic rotator cuff tear, left [M75.102]  SLAP lesion of left shoulder [S43.432A]  Biceps tendonitis on left [M75.22]  Closed nondisplaced fracture of greater tuberosity of left humerus [S42.255A]    Post-op Diagnosis:  Post-Op Diagnosis Codes:     * Non-traumatic rotator cuff tear, left [M75.102]     * SLAP lesion of left shoulder [S43.432A]     * Biceps tendonitis on left [M75.22]     * Closed nondisplaced fracture of greater tuberosity of left humerus [S42.255A]    Procedure(s) (LRB):  REPAIR, ROTATOR CUFF, ARTHROSCOPIC (Left)  DEBRIDEMENT, SHOULDER, ARTHROSCOPIC (Left)  FIXATION, TENDON (Left)  ORIF, FRACTURE, HUMERUS (Left)  REPAIR (Left)    Anesthesia: General    Operative Findings: high grade partial thickness supraspinatus tear, biceps tendinitis     Estimated Blood Loss: * No values recorded between 3/12/2024 10:00 AM and 3/12/2024 11:30 AM *         Specimens:   Specimen (24h ago, onward)      None              Discharge Note    OUTCOME: Patient tolerated treatment/procedure well without complication and is now ready for discharge.    DISPOSITION: Home or Self Care    FINAL DIAGNOSIS:  <principal problem not specified>    FOLLOWUP: In clinic    DISCHARGE INSTRUCTIONS:    Discharge Procedure Orders   SLING FOR HOME USE   Order Comments: Post-op sling     Order Specific Question Answer Comments   Height: 5' 3" (1.6 m)    Weight: 76 kg (167 lb 8.8 oz)    Does patient have medical equipment at home? none    Length of need (1-99 months): 12        "

## 2024-03-12 NOTE — ANESTHESIA PREPROCEDURE EVALUATION
Pre-operative evaluation for Procedure(s) (LRB):  REPAIR, ROTATOR CUFF, ARTHROSCOPIC (Left)  DEBRIDEMENT, SHOULDER, ARTHROSCOPIC (Left)  FIXATION, TENDON (Left)  ORIF, FRACTURE, HUMERUS (Left)  REPAIR (Left)    Marcos Barfield is a 74 y.o. male     Patient Active Problem List   Diagnosis    Right shoulder pain    Rotator cuff syndrome of right shoulder    Traumatic tear of right rotator cuff    Decreased range of motion (ROM) of shoulder    Weakness of shoulder    Left shoulder pain       Review of patient's allergies indicates:   Allergen Reactions    Meloxicam Other (See Comments)     Drove blood pressure james high per pt       No current facility-administered medications on file prior to encounter.     Current Outpatient Medications on File Prior to Encounter   Medication Sig Dispense Refill    ascorbic acid-bioflavonoids 200-300 mg Tab TAKE  BY MOUTH      aspirin (ECOTRIN) 81 MG EC tablet Take 81 mg by mouth once daily.        beta-carotene,A,-vits C,E/mins (OCUVITE ORAL) Take 1 tablet by mouth 2 (two) times a day.      cyanocobalamin (VITAMIN B-12) 1000 MCG tablet TAKE ONE TABLET BY MOUTH EVERY DAY FOR VITAMIN DEFICIENCIES      gabapentin (NEURONTIN) 300 MG capsule Take 300 mg by mouth 3 (three) times daily.      insulin aspart U-100 (NOVOLOG) 100 unit/mL (3 mL) InPn pen INJECT 5 UNITS SUBCUTANEOUSLY WITH SUPPER FOR DIABETES  WITH FIRST BITE OF MAIN MEAL..   DONOTINJECT ASPART AT BEDTIME. DO NOT INJECT ASPART IF YOU ARE SKIPPING  THE ASSOCIATED MEAL. FOR DIABETES  WITH FIRST BITE OF MAIN MEAL..    DONOTINJECT ASPART AT BEDTIME. DO NOT INJECT ASPART IF YOU ARE SKIPPING   THE ASSOCIATED MEAL.      insulin glargine (LANTUS) 100 unit/mL injection Inject 32 Units into the skin every evening.        lisinopril (PRINIVIL,ZESTRIL) 20 MG tablet Take 20 mg by mouth once daily.      lisinopriL (PRINIVIL,ZESTRIL) 40 MG tablet TAKE ONE-HALF TABLET BY MOUTH QD FOR HEART/BLOOD PRESSURE      lisinopriL 10 MG tablet Take 10 mg by  mouth.      metFORMIN (GLUMETZA) 1000 MG (MOD) 24hr tablet Take 1,000 mg by mouth daily with breakfast.      pravastatin (PRAVACHOL) 80 MG tablet Take 80 mg by mouth every evening.      fluticasone (FLONASE) 50 mcg/actuation nasal spray   0    meclizine (ANTIVERT) 25 mg tablet   3    mupirocin (BACTROBAN) 2 % ointment Apply topically 2 (two) times daily. 22 g 0    ondansetron (ZOFRAN) 4 MG tablet Take 1 tablet (4 mg total) by mouth every 8 (eight) hours as needed for Nausea. 12 tablet 0    sildenafiL (VIAGRA) 100 MG tablet TAKE ONE-HALF TABLET BY MOUTH EVERY DAY 30 TO 60 MINUTES BEFORE INTERCOURSE FOR BEST RESULTS TAKE ON EMPTY STOMACH         Past Surgical History:   Procedure Laterality Date    ARTHROSCOPIC DEBRIDEMENT OF SHOULDER Right 8/20/2020    Procedure: DEBRIDEMENT, SHOULDER, ARTHROSCOPIC;  Surgeon: Kayleen Sherman MD;  Location: [facility] OR;  Service: Orthopedics;  Laterality: Right;    ARTHROSCOPIC REPAIR OF ROTATOR CUFF OF SHOULDER Right 8/20/2020    Procedure: REPAIR, ROTATOR CUFF, ARTHROSCOPIC;  Surgeon: Kayleen Sherman MD;  Location: [facility] OR;  Service: Orthopedics;  Laterality: Right;    ARTHROSCOPY OF SHOULDER WITH REMOVAL OF DISTAL CLAVICLE Right 8/20/2020    Procedure: ARTHROSCOPY, SHOULDER, WITH DISTAL CLAVICLE EXCISION;  Surgeon: Kayleen Sherman MD;  Location: [facility] OR;  Service: Orthopedics;  Laterality: Right;    COLONOSCOPY      x2    CYST REMOVAL      mid back    FIXATION OF TENDON Right 8/20/2020    Procedure: FIXATION, TENDON, Biceps Tenodesis;  Surgeon: Kayleen Sherman MD;  Location: [facility] OR;  Service: Orthopedics;  Laterality: Right;  FIXATION, TENDON, Biceps Tenodesis    ORIF HUMERUS FRACTURE Right 8/20/2020    Procedure: ORIF, FRACTURE, HUMERUS;  Surgeon: Kayleen Sherman MD;  Location: [facility] OR;  Service: Orthopedics;  Laterality: Right;    SHOULDER SURGERY      VASECTOMY           CBC:  Lab Results   Component Value Date    WBC 5.93 03/06/2024    RBC 4.32 (L) 03/06/2024    HGB 12.7 (L) 03/06/2024    HCT 38.9  "(L) 03/06/2024     03/06/2024    MCV 90 03/06/2024    MCH 29.4 03/06/2024    MCHC 32.6 03/06/2024       CMP:   Lab Results   Component Value Date     03/06/2024    K 5.4 (H) 03/06/2024     03/06/2024    CO2 29 03/06/2024    BUN 28 (H) 03/06/2024    CREATININE 1.7 (H) 03/06/2024    GLU 76 03/06/2024    CALCIUM 9.5 03/06/2024    ALBUMIN 3.9 03/06/2024    PROT 6.7 03/06/2024    ALKPHOS 76 03/06/2024    ALT 23 03/06/2024    AST 21 03/06/2024    BILITOT 0.4 03/06/2024       INR:  No results found for: "PT", "INR", "PROTIME", "APTT"      Diagnostic Studies:      EKG:   Results for orders placed or performed during the hospital encounter of 03/06/24   EKG 12-lead    Collection Time: 03/06/24 10:18 AM   Result Value Ref Range    QRS Duration 154 ms    [facility] QTC Calculation 434 ms    Narrative    Test Reason : Z01.818,    Vent. Rate : 066 BPM     Atrial Rate : 066 BPM     P-R Int : 168 ms          QRS Dur : 154 ms      QT Int : 414 ms       P-R-T Axes : 073 -76 063 degrees     QTc Int : 434 ms    Program found technically poor ECG  Sinus rhythm  Right bundle branch block  Left anterior fascicular block   Bifascicular block   Abnormal ECG  No previous ECGs available  Confirmed by Kathleen Melendrez MD ([id number]) on 3/6/2024 4:59:51 PM    Referred By: ISMAEL GRECO           Confirmed By:Kathleen Melendrez MD        2D Echo:  No results found for this or any previous visit.    Stress Test:   No results found for this or any previous visit.      Pre-op Vitals [03/12/24 0729]   BP Pulse Resp Temp SpO2   (!) 174/88 65 18 37.1 °C (98.8 °F) 100 %      Height Weight BMI (Calculated)     5' 10" 168 lb 24.1         Pre-op Vitals [03/12/24 0729]   BP Pulse Resp Temp SpO2   (!) 174/88 65 18 37.1 °C (98.8 °F) 100 %      Height Weight BMI (Calculated)     5' 10" 168 lb 24.1            Pre-op Assessment    I have reviewed the Patient Summary Reports.       I have reviewed the Medications.     Review of Systems  Anesthesia Hx:  No problems " with previous Anesthesia   History of prior surgery of interest to airway management or planning:  Previous anesthesia: General 8/20/2020 Right Shoulder Arthroscopy, Repair of Rotator Cuff with general anesthesia.  Procedure performed at an [facility].      Airway issues documented on chart review include mask, easy, GETA, easy direct laryngoscopy , view on direct laryngoscopy Grade I      Denies Personal Hx of Anesthesia complications.                    Social:  Non-Smoker, No Alcohol Use       Hematology/Oncology:    Oncology Normal    -- Anemia:               Hematology Comments: H/O anemia as a kid, H&H = 12.7/ 38.9 on 3/6/2024                    EENT/Dental:  chronic allergic rhinitis           Cardiovascular:  Exercise tolerance: good   Hypertension   Denies MI.     Denies CABG/stent.    Denies Angina.     hyperlipidemia  Denies JALLOH.  ECG has been reviewed.                          Pulmonary:  Pulmonary Normal    Denies Asthma.   Denies Shortness of breath.                  Renal/:  Chronic Renal Disease, CKD   H/O Vasectomy, BUN = 28, Creat = 1.7, eGFR = 41.3, K+ = 5.4 on 3/6/2024,  [facility] 5.[facility]             Hepatic/GI:  Hepatic/GI Normal     Denies GERD. Denies Liver Disease.            Musculoskeletal:  Arthritis   Non-traumatic rotator cuff tear, left,  SLAP lesion of left shoulder,  Biceps tendonitis on left,  Closed nondisplaced fracture of greater tuberosity of left humerus,  H/O right Shoulder Arthroscopy, Rotator Cuff Repair, Debridement, Removal of Distal Clavicle, Fixation of Biceps Tendon, ORIF right Humerus Fracture,  Decreased range of motion (ROM) of shoulder,  Weakness of shoulder,  Left shoulder pain, Cervical spondylosis           Spine Disorders: cervical Degenerative disease and Disc disease           Neurological:  Neurology Normal   Denies CVA.    Denies Headaches. Denies Seizures.                                Endocrine:  Diabetes, well controlled, type 2, using insulin  Denies Hypothyroidism.  HA1C =         Dermatological:  H/O Cyst removal from mid-back   Psych:  Psychiatric Normal                    Physical Exam  General: Well nourished, Cooperative, Alert and Oriented    Airway:  Mallampati: II   Mouth Opening: Normal  TM Distance: Normal  Tongue: Normal  Neck ROM: Normal ROM    Dental:  Intact    Heart:  Rate: Normal  Rhythm: Regular Rhythm        Anesthesia Plan  Type of Anesthesia, risks & benefits discussed:    Anesthesia Type: Gen ETT  Intra-op Monitoring Plan: Standard ASA Monitors  Post Op Pain Control Plan: multimodal analgesia and IV/PO Opioids PRN  Induction:  IV  Airway Plan: Video, Post-Induction  Informed Consent: Informed consent signed with the Patient and all parties understand the risks and agree with anesthesia plan.  All questions answered. Patient consented to blood products? Yes  ASA Score: 2  Day of Surgery Review of History & Physical: H&P Update referred to the surgeon/provider.    Ready For Surgery From Anesthesia Perspective.     .

## 2024-03-22 ENCOUNTER — CLINICAL SUPPORT (OUTPATIENT)
Dept: REHABILITATION | Facility: HOSPITAL | Age: 74
End: 2024-03-22
Payer: MEDICARE

## 2024-03-22 DIAGNOSIS — M75.102 NON-TRAUMATIC ROTATOR CUFF TEAR, LEFT: ICD-10-CM

## 2024-03-22 DIAGNOSIS — M25.512 ACUTE PAIN OF LEFT SHOULDER: Primary | ICD-10-CM

## 2024-03-22 PROCEDURE — 97161 PT EVAL LOW COMPLEX 20 MIN: CPT

## 2024-03-22 PROCEDURE — 97112 NEUROMUSCULAR REEDUCATION: CPT

## 2024-03-22 PROCEDURE — 97110 THERAPEUTIC EXERCISES: CPT

## 2024-03-25 ENCOUNTER — CLINICAL SUPPORT (OUTPATIENT)
Dept: REHABILITATION | Facility: HOSPITAL | Age: 74
End: 2024-03-25
Attending: ORTHOPAEDIC SURGERY
Payer: MEDICARE

## 2024-03-25 ENCOUNTER — HOSPITAL ENCOUNTER (OUTPATIENT)
Dept: RADIOLOGY | Facility: HOSPITAL | Age: 74
Discharge: HOME | End: 2024-03-25
Attending: PHYSICIAN ASSISTANT
Payer: MEDICARE

## 2024-03-25 ENCOUNTER — OFFICE VISIT (OUTPATIENT)
Dept: SPORTS MEDICINE | Facility: CLINIC | Age: 74
End: 2024-03-25
Payer: MEDICARE

## 2024-03-25 VITALS
HEART RATE: 79 BPM | WEIGHT: 160.94 LBS | DIASTOLIC BLOOD PRESSURE: 77 MMHG | SYSTOLIC BLOOD PRESSURE: 141 MMHG | BODY MASS INDEX: 23.09 KG/M2

## 2024-03-25 DIAGNOSIS — Z98.890 S/P SHOULDER SURGERY: Primary | ICD-10-CM

## 2024-03-25 DIAGNOSIS — M25.512 LEFT SHOULDER PAIN, UNSPECIFIED CHRONICITY: ICD-10-CM

## 2024-03-25 DIAGNOSIS — M25.512 ACUTE PAIN OF LEFT SHOULDER: Primary | ICD-10-CM

## 2024-03-25 DIAGNOSIS — Z98.890 S/P ROTATOR CUFF SURGERY: ICD-10-CM

## 2024-03-25 PROCEDURE — 1160F RVW MEDS BY RX/DR IN RCRD: CPT | Mod: CPTII,S$GLB,, | Performed by: PHYSICIAN ASSISTANT

## 2024-03-25 PROCEDURE — 99024 POSTOP FOLLOW-UP VISIT: CPT | Mod: S$GLB,,, | Performed by: PHYSICIAN ASSISTANT

## 2024-03-25 PROCEDURE — 3077F SYST BP >= 140 MM HG: CPT | Mod: CPTII,S$GLB,, | Performed by: PHYSICIAN ASSISTANT

## 2024-03-25 PROCEDURE — 1159F MED LIST DOCD IN RCRD: CPT | Mod: CPTII,S$GLB,, | Performed by: PHYSICIAN ASSISTANT

## 2024-03-25 PROCEDURE — 97112 NEUROMUSCULAR REEDUCATION: CPT

## 2024-03-25 PROCEDURE — 3078F DIAST BP <80 MM HG: CPT | Mod: CPTII,S$GLB,, | Performed by: PHYSICIAN ASSISTANT

## 2024-03-25 PROCEDURE — 99999 PR PBB SHADOW E&M-EST. PATIENT-LVL IV: CPT | Mod: PBBFAC,,, | Performed by: PHYSICIAN ASSISTANT

## 2024-03-25 PROCEDURE — 73030 X-RAY EXAM OF SHOULDER: CPT | Mod: TC,LT

## 2024-03-25 PROCEDURE — 73030 X-RAY EXAM OF SHOULDER: CPT | Mod: 26,LT,, | Performed by: RADIOLOGY

## 2024-03-25 PROCEDURE — 1125F AMNT PAIN NOTED PAIN PRSNT: CPT | Mod: CPTII,S$GLB,, | Performed by: PHYSICIAN ASSISTANT

## 2024-03-25 PROCEDURE — 97110 THERAPEUTIC EXERCISES: CPT

## 2024-03-25 RX ORDER — SIMVASTATIN 80 MG/1
80 TABLET, FILM COATED ORAL NIGHTLY
COMMUNITY

## 2024-03-25 RX ORDER — EZETIMIBE AND SIMVASTATIN 10; 10 MG/1; MG/1
1 TABLET ORAL NIGHTLY
COMMUNITY

## 2024-03-25 NOTE — PLAN OF CARE
[facility]   Physical Therapy Initial Evaluation      Name: Marcos Barfield  [facility] Number: [id number]    Therapy Diagnosis:   Encounter Diagnosis   Name Primary?    Non-traumatic rotator cuff tear, left         Physician: Nhan Grullon III, *    Physician Orders: PT Eval and Treat   Medical Diagnosis from Referral:   M75.102 (ICD-10-CM) - Non-traumatic rotator cuff tear, left     Evaluation Date: 3/22/2024  Authorization Period Expiration: 12/31/2024  Plan of Care Expiration: 9/30/24  Progress Note Due: 4/22/24  Date of Surgery: 3/12/24  Visit # / Visits authorized: 1/ 1   FOTO: 1/ 3    Precautions:   POSTOPERATIVE PLAN: We will follow the arthroscopic rotator cuff repair guidelines for a small size rotator cuff tear.  We discussed with the patient's family after surgery.  The patient will remain in a sling for 6 weeks.  PT to start at 1-2 weeks.     Cuff specific program:  Pendulum exercises and Codman's exercises in 5-7 days, protecting rotator cuff repair for 1 week by avoiding active motion program until 1 week.     PASSIVE ROM: ER side 30 degrees, Forward Flex 90 degrees, ABD - 60 degrees   Full AAROM/PROM starting at 5-7 days as tolerated      Time In: 12:35 pm  Time Out: 1:00 pm  Total Billable Time: 55 minutes    Subjective     Date of onset: DOS 3/12/24    History of current condition - Marcos reports: L shoulder RTC repair 10 days out. Notes that the pain initially started insidiously. Hx of RTC surgery on R side. Since the surgery he notes that he is in pain and has been taking tylenol because he doesn't want to take his pain pills. Denies constitutional signs. There are no red flags present    Falls: none    Imaging: see chart    Prior Therapy: yes for previous shoulder  Social History:  lives with their family  Occupation: retired   Prior Level of Function: limited by pain  Current Level of Function: same    Pain:  Current 2/10, worst 8/10, best 2/10   Location: left  shoulder  Description: Aching and Dull  Aggravating Factors: movement  Easing Factors: pain medication    Patients goals: return to ADLs     Medical History:   Past Medical History:   Diagnosis Date    Anemia     kid     CKD (chronic kidney disease)     Diabetes mellitus     High cholesterol     Hypertension        Surgical History:   Marcos Barfield  has a past surgical history that includes Colonoscopy; Vasectomy; Cyst Removal; Arthroscopic repair of rotator cuff of shoulder (Right, 8/20/2020); Fixation of tendon (Right, 8/20/2020); Arthroscopic debridement of shoulder (Right, 8/20/2020); Arthroscopy of shoulder with removal of distal clavicle (Right, 8/20/2020); ORIF humerus fracture (Right, 8/20/2020); Shoulder surgery; Arthroscopic repair of rotator cuff of shoulder (Left, 3/12/2024); Fixation of tendon (Left, 3/12/2024); and Arthroscopy of shoulder with decompression of subacromial space (Left, 3/12/2024).    Medications:   Marcos has a current medication list which includes the following prescription(s): ascorbic acid-bioflavonoids, aspirin, beta-carotene(a)-vits c,e/mins, cyanocobalamin, docusate sodium, fluticasone propionate, gabapentin, insulin aspart u-100, insulin glargine, lisinopril, lisinopril, lisinopril, meclizine, metformin, mupirocin, ondansetron, oxycodone-acetaminophen, pravastatin, promethazine, sildenafil, and tramadol.    Allergies:   Review of patient's allergies indicates:   Allergen Reactions    Meloxicam Other (See Comments)     Drove blood pressure james high per pt        Objective      Observation: Patient enters clinic with ABD sling donned, wounds clean/dry/intact with no signs of infection or excessive drainage. No abduction pillow donned     Posture: rounded shoulders      Shoulder Active Range of Motion: Not tested 2/2 post-operative status     Shoulder Passive Range of Motion:   Shoulder Right Left   Flexion   WNL 70   ER at 0   WNL 15   ER at 90   Not tested 2/2 post-op Not tested  2/2 post-op   IR at 90   Not tested 2/2 post-op Not tested 2/2 post-op     Strength: not tested 2/2 post-operative status    Special Tests: not tested 2/2 post-operative status    Joint Mobility: NT due to secondary post op status    Palpation: warmth around surgical incisions as expected         Intake Outcome Measure for FOTO shoulder Survey    Therapist reviewed FOTO scores for Marcos Barfield on 3/22/2024.   FOTO report - see Media section or FOTO account episode details.    Intake Score: 56% Disability         Treatment     Total Treatment time (time-based codes) separate from Evaluation: 25 minutes     Marcos received the treatments listed below:      therapeutic exercises to develop strength and ROM for 15 minutes including:  AAROM ER at 0 c dowel x10  Elbow AROM   Elbow LLLD hangs  Table slides  Pt education    neuromuscular re-education activities to improve: Kinesthetic and Proprioception for 10 minutes. The following activities were included:  Pendulums x1min  Scap squeezes 3s x15        Patient Education and Home Exercises     Education provided:   - see above     Written Home Exercises Provided: yes. Exercises were reviewed and Marcos was able to demonstrate them prior to the end of the session.  Marcos demonstrated good  understanding of the education provided. See EMR under Patient Instructions for exercises provided during therapy sessions.    Assessment     Marcos is a 74 y.o. male referred to outpatient Physical Therapy with a medical diagnosis of   M75.102 (ICD-10-CM) - Non-traumatic rotator cuff tear, left   Patient presents with with complaints of continued left shoulder pain  consistent with referring dx limiting ADL's and functional activities 2/2 post-operative status. Upon evaluation patient presents with decreased ROM, joint mobility and flexibility restrictions, decreased strength and motor control contributing to limited functional status at this time. Patient would benefit from appropriate  manual therapy, mobility, flexibility, strengthening and NM re-education in order to address the before-mentioned deficits and return to PLOF with ADLs.       Patient prognosis is Good.   Patient will benefit from skilled outpatient Physical Therapy to address the deficits stated above and in the chart below, provide patient /family education, and to maximize patientt's level of independence.     Plan of care discussed with patient: Yes  Patient's spiritual, cultural and educational needs considered and patient is agreeable to the plan of care and goals as stated below:     Anticipated Barriers for therapy: advanced age    Medical Necessity is demonstrated by the following  History  Co-morbidities and personal factors that may impact the plan of care [x] LOW: no personal factors / co-morbidities  [] MODERATE: 1-2 personal factors / co-morbidities  [] HIGH: 3+ personal factors / co-morbidities    Moderate / High Support Documentation:   Co-morbidities affecting plan of care: see above    Personal Factors:   no deficits     Examination  Body Structures and Functions, activity limitations and participation restrictions that may impact the plan of care [x] LOW: addressing 1-2 elements  [] MODERATE: 3+ elements  [] HIGH: 4+ elements (please support below)    Moderate / High Support Documentation: see above      Clinical Presentation [] LOW: stable  [] MODERATE: Evolving  [] HIGH: Unstable     Decision Making/ Complexity Score: low       Post OP shoulder Goals  Short Term Goals (4 Weeks):   1. Pt will be independent with HEP to supplement PT in improving functional use of R UE.  2. Pt will increase pain free left shoulder elevation PROM to >/= 160 deg to improve functional mobility of UE  3. Pt will increase L shoulder ER PROM in 90 deg abduction to >/=20 deg to improve functional mobility of UE  4. Pt will increase L elbow AAROM to WNL deg in 4 weeks to improve functional mobility of UE.    Long Term Goals (24 Weeks):    1. Pt will improve FOTO score to </=30% disability limited to decrease perceived limitation with L shoulder carrying, moving, and handling objects.  2. Pt will increase L shoulder PROM to WNL in all planes to improve functional use of R RUE.  3. Pt will increase L shoulder AROM to WFL in all planes to improve functional use of R RUE.  4. Pt will improve L shoulder MMTs in areas of limitation to promote equal use of B UEs in performing functional tasks.  5. Pt will lift 10 lb objects without pain to promote functional QOL.    Plan     Plan of care Certification: 3/22/2024 to 9/30/24.    Outpatient Physical Therapy 1-2 times weekly for 24 weeks to include the following interventions: Gait Training, Manual Therapy, Neuromuscular Re-ed, Therapeutic Activities, and Therapeutic Exercise.     Wayne Sandoval PT, DPT.           Physician's Signature: _________________________________________ Date: ________________

## 2024-03-25 NOTE — PROGRESS NOTES
[facility]   Physical Therapy Treatment Note      Name: Marcos Barfield  Clinic Number: [id number]    Therapy Diagnosis:        Encounter Diagnosis   Name Primary?    Non-traumatic rotator cuff tear, left       Physician: Nhan Grullon III, *    Physician Orders: PT Eval and Treat  Medical Diagnosis from Referral:   M75.102 (ICD-10-CM) - Non-traumatic rotator cuff tear, left      Evaluation Date: 3/22/2024  Authorization Period Expiration: 12/31/2024  Plan of Care Expiration: 9/30/24  Progress Note Due: 4/22/24  Date of Surgery: 3/12/24  Visit # / Visits authorized: 1/ 1   FOTO: 1/ 3     Precautions:   POSTOPERATIVE PLAN: We will follow the arthroscopic rotator cuff repair guidelines for a small size rotator cuff tear.  We discussed with the patient's family after surgery.  The patient will remain in a sling for 6 weeks.  PT to start at 1-2 weeks.     Cuff specific program:  Pendulum exercises and Codman's exercises in 5-7 days, protecting rotator cuff repair for 1 week by avoiding active motion program until 1 week.     PASSIVE ROM: ER side 30 degrees, Forward Flex 90 degrees, ABD - 60 degrees   Full AAROM/PROM starting at 5-7 days as tolerated       Time In: 11:00 am  Time Out: 11:55 am  Total Billable Time: 30 minutes one on one   Visit Date: 3/25/2024    Subjective     Patient reports: Feeling good today, states exercises went well over weekend. Only has slight pain in front of shoulder.  He was compliant with home exercise program.  Response to previous treatment: Did well after evaluation  Functional change: ongoing    Pain: 3/10  Location: left shoulder      Objective      Observation: wearing ABD sling in proper position     Posture: rounded shoulders      Shoulder Passive Range of Motion:   Shoulder Right Left   Flexion    WNL 95   ER at 0    WNL 37         Joint Mobility: NT due to secondary post op status       Treatment     Marcos received the treatments listed below:       therapeutic exercises to develop strength, endurance, and ROM for 23 minutes including:  AAROM ER at 0 x20  Chest press AAROM 2x10  Elbow extension LLLD stretch x6 min  Scap squeezes 3s x20    manual therapy techniques: Joint mobilizations and Soft tissue Mobilization were applied to the: L shoulder for 10 minutes, including:  PROM check ER at 0/Flexion  GH centering mobe 30s x3    neuromuscular re-education activities to improve: Balance, Coordination, and Proprioception for 22 minutes. The following activities were included:  Pendulums x30  Table Slides x20   Table walk back x20      Patient Education and Home Exercises       Education provided:   - POC  - HEP modifications    Written Home Exercises Provided: Patient instructed to cont prior HEP. Exercises were reviewed and Marcos was able to demonstrate them prior to the end of the session.  Marcos demonstrated good  understanding of the education provided. See Electronic Medical Record under Patient Instructions for exercises provided during therapy sessions    Assessment     Marcos is doing well and ROM appears to improve after manual techniques and AAROM exercises. Improved ER at 0 to 37 degrees and flexion to 95 degrees. Pt tolerated therapy well and able to add exercises without issues to emphasize progressing AAROM. Pt demos continued understanding of HEP.     Marcos Is progressing well towards his goals.   Patient prognosis is Good.     Patient will continue to benefit from skilled outpatient physical therapy to address the deficits listed in the problem list box on initial evaluation, provide pt/family education and to maximize pt's level of independence in the home and community environment.     Patient's spiritual, cultural and educational needs considered and pt agreeable to plan of care and goals.     Anticipated barriers to physical therapy: advanced age    Goals:   Post OP shoulder Goals  Short Term Goals (4 Weeks):   1. Pt will be independent with  HEP to supplement PT in improving functional use of R UE.  2. Pt will increase pain free left shoulder elevation PROM to >/= 160 deg to improve functional mobility of UE  3. Pt will increase L shoulder ER PROM in 90 deg abduction to >/=20 deg to improve functional mobility of UE  4. Pt will increase L elbow AAROM to WNL deg in 4 weeks to improve functional mobility of UE.     Long Term Goals (24 Weeks):   1. Pt will improve FOTO score to </=30% disability limited to decrease perceived limitation with L shoulder carrying, moving, and handling objects.  2. Pt will increase L shoulder PROM to WNL in all planes to improve functional use of R RUE.  3. Pt will increase L shoulder AROM to WFL in all planes to improve functional use of R RUE.  4. Pt will improve L shoulder MMTs in areas of limitation to promote equal use of B UEs in performing functional tasks.  5. Pt will lift 10 lb objects without pain to promote functional QOL.    Plan     Plan of care Certification: 3/22/2024 to 9/30/24.     Outpatient Physical Therapy 1-2 times weekly for 24 weeks to include the following interventions: Gait Training, Manual Therapy, Neuromuscular Re-ed, Therapeutic Activities, and Therapeutic Exercise.     Lucy Fraser, SPT   I certify that I was present in the room directing the student in service delivery and guiding them using my skilled judgment.  As the co-signing therapist I have reviewed the students documentation and am responsible for the treatment, assessment, and plan   Wayne Jaime PT, DPT

## 2024-03-26 NOTE — PROGRESS NOTES
Chief Complaint: left shoulder pain    HISTORY OF PRESENT ILLNESS:   Pt is here today for post-operative followup of shoulder arthroscopy.  he is doing well.  We have reviewed patient's findings and discussed plan of care and future treatment options.      Tolerating pain well.   Wearing sling which is in place.  Started PT.   No issues.   Pain at 3/10.    DATE OF PROCEDURE: 03/12/2024  SURGEON: Kayleen Sherman M.D.  OPERATION:   left  1. Shoulder arthroscopic rotator cuff repair (CPT 86649) with CuffMend () (80Y29on 40% partial thickness bursal)  2. Shoulder arthroscopic biceps tenodesis (CPT 90334)  3. Shoulder arthroscopic subacromial decompression, bursectomy   4. Shoulder arthroscopic extensive debridement (anterior, posterior glenohumeral joint, subacromial space) (CPT 90913)  5. Shoulder arthroscopic labral debridement (CPT 37597)  6. Shoulder arthroscopic lysis of adhesions (CPT 17710)                                                                                    PHYSICAL EXAMINATION:     Incision sites healed well  No evidence of any erythema, infection or induration  elbow Range of motion full   Minimal effusion  2+ Radial pulses  No swelling                                                                               ASSESSMENT:                                                                                                                                               1. Status post above, doing well.                                                                                                                               PLAN:                                                                                                                                                     1. PT; wean narcotics  2. Emphasized scapular function.  3. I have discussed return to activity in detail.  4. Patient will see us back in 4 weeks.                                      5. Discussed case with PT.    All questions  were answered, surgical technique was reviewed and interpreted, and patient should contact us with any questions or concerns in the interim.

## 2024-03-27 ENCOUNTER — CLINICAL SUPPORT (OUTPATIENT)
Dept: REHABILITATION | Facility: HOSPITAL | Age: 74
End: 2024-03-27
Attending: PHYSICIAN ASSISTANT
Payer: MEDICARE

## 2024-03-27 DIAGNOSIS — M25.512 ACUTE PAIN OF LEFT SHOULDER: Primary | ICD-10-CM

## 2024-03-27 PROCEDURE — 97112 NEUROMUSCULAR REEDUCATION: CPT

## 2024-03-27 NOTE — PROGRESS NOTES
[facility]   Physical Therapy Treatment Note      Name: Marcos Barfield  Clinic Number: [id number]    Therapy Diagnosis:        Encounter Diagnosis   Name Primary?    Non-traumatic rotator cuff tear, left       Physician: Nhan Grullon III, *    Physician Orders: PT Eval and Treat  Medical Diagnosis from Referral:   M75.102 (ICD-10-CM) - Non-traumatic rotator cuff tear, left      Evaluation Date: 3/22/2024  Authorization Period Expiration: 12/31/2024  Plan of Care Expiration: 9/30/24  Progress Note Due: 4/22/24  Date of Surgery: 3/12/24  Visit # / Visits authorized: 1/ 1   FOTO: 1/ 3     Precautions:   POSTOPERATIVE PLAN: We will follow the arthroscopic rotator cuff repair guidelines for a small size rotator cuff tear.  We discussed with the patient's family after surgery.  The patient will remain in a sling for 6 weeks.  PT to start at 1-2 weeks.     Cuff specific program:  Pendulum exercises and Codman's exercises in 5-7 days, protecting rotator cuff repair for 1 week by avoiding active motion program until 1 week.     PASSIVE ROM: ER side 30 degrees, Forward Flex 90 degrees, ABD - 60 degrees   Full AAROM/PROM starting at 5-7 days as tolerated       Time In: 11:00 am  Time Out: 11:55 am  Total Billable Time: 15 minutes one on one   Visit Date: 3/27/2024    Subjective     Patient reports: Feeling good today, states exercises went well over weekend. Only has slight pain in front of shoulder.  He was compliant with home exercise program.  Response to previous treatment: Did well after evaluation  Functional change: ongoing    Pain: 2/10  Location: left shoulder      Objective    DOS: 3/12/24      Observation: wearing ABD sling in proper position     Posture: rounded shoulders      Shoulder Passive Range of Motion:   Shoulder Right Left   Flexion       ER at 0    WNL 47         Joint Mobility: NT due to secondary post op status       Treatment     Marcos received the treatments listed  below:      therapeutic exercises to develop strength, endurance, and ROM for 23 minutes including:  AAROM ER at 0 x20  Chest press AAROM 2x10  Chest press with slight shoulder flexion 2x10  Scap squeezes 3s x20  Pulleys scaption 3min     manual therapy techniques: Joint mobilizations and Soft tissue Mobilization were applied to the: L shoulder for 10 minutes, including:  PROM check ER at 0/Flexion  Elbow ext stretch 15s x3      neuromuscular re-education activities to improve: Balance, Coordination, and Proprioception for 22 minutes. The following activities were included:  Pendulums x30  Table Slides x20   Table walk back x20      Patient Education and Home Exercises       Education provided:   - POC  - HEP modifications    Written Home Exercises Provided: Patient instructed to cont prior HEP. Exercises were reviewed and Marcos was able to demonstrate them prior to the end of the session.  Marcos demonstrated good  understanding of the education provided. See Electronic Medical Record under Patient Instructions for exercises provided during therapy sessions    Assessment     Marcos is doing well and ROM appears to improve after manual techniques and AAROM exercises. Improved ER at 0 to 37 degrees and flexion to 95 degrees. Pt tolerated therapy well and able to add exercises without issues to emphasize progressing AAROM. Pt demos continued understanding of HEP.     Marcos Is progressing well towards his goals.   Patient prognosis is Good.     Patient will continue to benefit from skilled outpatient physical therapy to address the deficits listed in the problem list box on initial evaluation, provide pt/family education and to maximize pt's level of independence in the home and community environment.     Patient's spiritual, cultural and educational needs considered and pt agreeable to plan of care and goals.     Anticipated barriers to physical therapy: advanced age    Goals:   Post OP shoulder Goals  Short Term Goals  (4 Weeks):   1. Pt will be independent with HEP to supplement PT in improving functional use of R UE.  2. Pt will increase pain free left shoulder elevation PROM to >/= 160 deg to improve functional mobility of UE  3. Pt will increase L shoulder ER PROM in 90 deg abduction to >/=20 deg to improve functional mobility of UE  4. Pt will increase L elbow AAROM to WNL deg in 4 weeks to improve functional mobility of UE.     Long Term Goals (24 Weeks):   1. Pt will improve FOTO score to </=30% disability limited to decrease perceived limitation with L shoulder carrying, moving, and handling objects.  2. Pt will increase L shoulder PROM to WNL in all planes to improve functional use of R RUE.  3. Pt will increase L shoulder AROM to WFL in all planes to improve functional use of R RUE.  4. Pt will improve L shoulder MMTs in areas of limitation to promote equal use of B UEs in performing functional tasks.  5. Pt will lift 10 lb objects without pain to promote functional QOL.    Plan     Plan of care Certification: 3/22/2024 to 9/30/24.     Outpatient Physical Therapy 1-2 times weekly for 24 weeks to include the following interventions: Gait Training, Manual Therapy, Neuromuscular Re-ed, Therapeutic Activities, and Therapeutic Exercise.     Lucy Fraser, SPT   I certify that I was present in the room directing the student in service delivery and guiding them using my skilled judgment.  As the co-signing therapist I have reviewed the students documentation and am responsible for the treatment, assessment, and plan   Wayne Jaime PT, DPT

## 2024-04-03 ENCOUNTER — CLINICAL SUPPORT (OUTPATIENT)
Dept: REHABILITATION | Facility: HOSPITAL | Age: 74
End: 2024-04-03
Payer: MEDICARE

## 2024-04-03 DIAGNOSIS — M25.512 ACUTE PAIN OF LEFT SHOULDER: Primary | ICD-10-CM

## 2024-04-03 PROCEDURE — 97112 NEUROMUSCULAR REEDUCATION: CPT

## 2024-04-03 NOTE — PROGRESS NOTES
[facility]   Physical Therapy Treatment Note      Name: Marcos Barfield  Clinic Number: [id number]    Therapy Diagnosis:        Encounter Diagnosis   Name Primary?    Non-traumatic rotator cuff tear, left       Physician: Nhan Grullon III, *    Physician Orders: PT Eval and Treat  Medical Diagnosis from Referral:   M75.102 (ICD-10-CM) - Non-traumatic rotator cuff tear, left      Evaluation Date: 3/22/2024  Authorization Period Expiration: 12/31/2024  Plan of Care Expiration: 9/30/24  Progress Note Due: 4/22/24  Date of Surgery: 3/12/24  Visit # / Visits authorized: 3/20  FOTO: 1/ 3     Precautions:   POSTOPERATIVE PLAN: We will follow the arthroscopic rotator cuff repair guidelines for a small size rotator cuff tear.  We discussed with the patient's family after surgery.  The patient will remain in a sling for 6 weeks.  PT to start at 1-2 weeks.     Cuff specific program:  Pendulum exercises and Codman's exercises in 5-7 days, protecting rotator cuff repair for 1 week by avoiding active motion program until 1 week.     PASSIVE ROM: ER side 30 degrees, Forward Flex 90 degrees, ABD - 60 degrees   Full AAROM/PROM starting at 5-7 days as tolerated       Time In: 10:00 am  Time Out: 10:50 am  Total Billable Time: 30 minutes one on one   Visit Date: 4/3/2024    Subjective     Patient reports: Feeling good today, states exercises went well over weekend. Only has slight pain in front of shoulder.  He was compliant with home exercise program.  Response to previous treatment: Did well after evaluation  Functional change: ongoing    Pain: 2/10  Location: left shoulder      Objective    DOS: 3/12/24    Observation: wearing ABD sling in proper position     Posture: rounded shoulders      Shoulder Passive Range of Motion:   Shoulder Right Left   Flexion       ER at 0    WNL 50         Joint Mobility: NT due to secondary post op status       Treatment     Marcos received the treatments listed  below:      therapeutic exercises to develop strength, endurance, and ROM for 23 minutes including:  AAROM ER at 0 x30  Chest press AAROM 2x10  Chest press with slight shoulder flexion 2x10  Scap squeezes 3s x20  Pulleys scaption 4 min     manual therapy techniques: Joint mobilizations and Soft tissue Mobilization were applied to the: L shoulder for 04 minutes, including:  PROM check ER at 0/Flexion  Elbow ext stretch 15s x3      neuromuscular re-education activities to improve: Balance, Coordination, and Proprioception for 23 minutes. The following activities were included:  Supine shoulder flexion AROM 0-90 - 2x10   Physio ball roll out flexion - 2x10   Table walk backs x20  Shoulder Iso at wall       Patient Education and Home Exercises       Education provided:   - POC  - HEP modifications    Written Home Exercises Provided: Patient instructed to cont prior HEP. Exercises were reviewed and Marcos was able to demonstrate them prior to the end of the session.  Marcos demonstrated good  understanding of the education provided. See Electronic Medical Record under Patient Instructions for exercises provided during therapy sessions    Assessment     ROM improving nicely. Able to slowly progress today with no pain or probs. Updated HEP to reflect.     Marcos Is progressing well towards his goals.   Patient prognosis is Good.     Patient will continue to benefit from skilled outpatient physical therapy to address the deficits listed in the problem list box on initial evaluation, provide pt/family education and to maximize pt's level of independence in the home and community environment.     Patient's spiritual, cultural and educational needs considered and pt agreeable to plan of care and goals.     Anticipated barriers to physical therapy: advanced age    Goals:   Post OP shoulder Goals  Short Term Goals (4 Weeks):   1. Pt will be independent with HEP to supplement PT in improving functional use of R UE.  2. Pt will  increase pain free left shoulder elevation PROM to >/= 160 deg to improve functional mobility of UE  3. Pt will increase L shoulder ER PROM in 90 deg abduction to >/=20 deg to improve functional mobility of UE  4. Pt will increase L elbow AAROM to WNL deg in 4 weeks to improve functional mobility of UE.     Long Term Goals (24 Weeks):   1. Pt will improve FOTO score to </=30% disability limited to decrease perceived limitation with L shoulder carrying, moving, and handling objects.  2. Pt will increase L shoulder PROM to WNL in all planes to improve functional use of R RUE.  3. Pt will increase L shoulder AROM to WFL in all planes to improve functional use of R RUE.  4. Pt will improve L shoulder MMTs in areas of limitation to promote equal use of B UEs in performing functional tasks.  5. Pt will lift 10 lb objects without pain to promote functional QOL.    Plan     Plan of care Certification: 3/22/2024 to 9/30/24.     Outpatient Physical Therapy 1-2 times weekly for 24 weeks to include the following interventions: Gait Training, Manual Therapy, Neuromuscular Re-ed, Therapeutic Activities, and Therapeutic Exercise.     Wayne Sandoval PT   I certify that I was present in the room directing the student in service delivery and guiding them using my skilled judgment.  As the co-signing therapist I have reviewed the students documentation and am responsible for the treatment, assessment, and plan   Wayne Sandoval PT, DPT

## 2024-04-05 ENCOUNTER — CLINICAL SUPPORT (OUTPATIENT)
Dept: REHABILITATION | Facility: HOSPITAL | Age: 74
End: 2024-04-05
Payer: MEDICARE

## 2024-04-05 DIAGNOSIS — M25.512 ACUTE PAIN OF LEFT SHOULDER: Primary | ICD-10-CM

## 2024-04-05 PROCEDURE — 97110 THERAPEUTIC EXERCISES: CPT

## 2024-04-05 PROCEDURE — 97140 MANUAL THERAPY 1/> REGIONS: CPT

## 2024-04-05 PROCEDURE — 97112 NEUROMUSCULAR REEDUCATION: CPT

## 2024-04-05 NOTE — PROGRESS NOTES
[facility]   Physical Therapy Treatment Note      Name: Marcos Barfield  Clinic Number: [id number]    Therapy Diagnosis:        Encounter Diagnosis   Name Primary?    Non-traumatic rotator cuff tear, left       Physician: Nhan Grullon III, *    Physician Orders: PT Eval and Treat  Medical Diagnosis from Referral:   M75.102 (ICD-10-CM) - Non-traumatic rotator cuff tear, left      Evaluation Date: 3/22/2024  Authorization Period Expiration: 12/31/2024  Plan of Care Expiration: 9/30/24  Progress Note Due: 4/22/24  Date of Surgery: 3/12/24  Visit # / Visits authorized: 3/20  FOTO: 1/ 3     Precautions:   POSTOPERATIVE PLAN: We will follow the arthroscopic rotator cuff repair guidelines for a small size rotator cuff tear.  We discussed with the patient's family after surgery.  The patient will remain in a sling for 6 weeks.  PT to start at 1-2 weeks.     Cuff specific program:  Pendulum exercises and Codman's exercises in 5-7 days, protecting rotator cuff repair for 1 week by avoiding active motion program until 1 week.     PASSIVE ROM: ER side 30 degrees, Forward Flex 90 degrees, ABD - 60 degrees   Full AAROM/PROM starting at 5-7 days as tolerated       Time In: 10:00 am  Time Out: 11:00 am  Total Billable Time: 30 minutes one on one   Visit Date: 4/5/2024    Subjective     Patient reports: Shoulder feels good today, HEP is going well, only has some pain in the front and side of the shoulder.  He was compliant with home exercise program.  Response to previous treatment: Did well after evaluation  Functional change: ongoing    Pain: 2/10  Location: left shoulder      Objective    DOS: 3/12/24  POD: 3 weeks, 3 days    Observation: wearing ABD sling in proper position     Posture: rounded shoulders      Shoulder Passive Range of Motion:   Shoulder Right Left   Flexion       ER at 0    WNL 52         Joint Mobility: NT due to secondary post op status       Treatment     Marcos received the  treatments listed below:      therapeutic exercises to develop strength, endurance, and ROM for 25 minutes including:  AAROM ER at 0 x30  Chest press AAROM 2x10  Scap squeezes 3s x20  Pulleys scaption 4 min   AAROM scaption 2x10    NP:  Chest press with slight shoulder flexion 2x10    manual therapy techniques: Joint mobilizations and Soft tissue Mobilization were applied to the: L shoulder for 10 minutes, including:  PROM check ER at 0/Flexion  Elbow ext stretch 15s x3    neuromuscular re-education activities to improve: Balance, Coordination, and Proprioception for 25 minutes. The following activities were included:  Supine shoulder flexion AROM 0-90 - 2x10   Table walk backs x20  Shoulder Iso at wall x10    NP:  Physio ball roll out flexion - 2x10       Patient Education and Home Exercises       Education provided:   - POC  - HEP modifications    Written Home Exercises Provided: Patient instructed to cont prior HEP. Exercises were reviewed and Marcos was able to demonstrate them prior to the end of the session.  Marcos demonstrated good  understanding of the education provided. See Electronic Medical Record under Patient Instructions for exercises provided during therapy sessions    Assessment     Marcos is doing well and demos good carryover of ROM improvements. Able to continue with ROM and strengthening as tolerated. Pt demos good understanding of HEP, educated on continuing protocol as instructed in regard to lifting precautions. Will continue to progress pt as tolerated.     Marcos Is progressing well towards his goals.   Patient prognosis is Good.     Patient will continue to benefit from skilled outpatient physical therapy to address the deficits listed in the problem list box on initial evaluation, provide pt/family education and to maximize pt's level of independence in the home and community environment.     Patient's spiritual, cultural and educational needs considered and pt agreeable to plan of care and  goals.     Anticipated barriers to physical therapy: advanced age    Goals:   Post OP shoulder Goals  Short Term Goals (4 Weeks):   1. Pt will be independent with HEP to supplement PT in improving functional use of R UE.  2. Pt will increase pain free left shoulder elevation PROM to >/= 160 deg to improve functional mobility of UE  3. Pt will increase L shoulder ER PROM in 90 deg abduction to >/=20 deg to improve functional mobility of UE  4. Pt will increase L elbow AAROM to WNL deg in 4 weeks to improve functional mobility of UE.     Long Term Goals (24 Weeks):   1. Pt will improve FOTO score to </=30% disability limited to decrease perceived limitation with L shoulder carrying, moving, and handling objects.  2. Pt will increase L shoulder PROM to WNL in all planes to improve functional use of R RUE.  3. Pt will increase L shoulder AROM to WFL in all planes to improve functional use of R RUE.  4. Pt will improve L shoulder MMTs in areas of limitation to promote equal use of B UEs in performing functional tasks.  5. Pt will lift 10 lb objects without pain to promote functional QOL.    Plan     Plan of care Certification: 3/22/2024 to 9/30/24.     Outpatient Physical Therapy 1-2 times weekly for 24 weeks to include the following interventions: Gait Training, Manual Therapy, Neuromuscular Re-ed, Therapeutic Activities, and Therapeutic Exercise.     Lucy Fraser, SPT   I certify that I was present in the room directing the student in service delivery and guiding them using my skilled judgment.  As the co-signing therapist I have reviewed the students documentation and am responsible for the treatment, assessment, and plan   Wayne Sandoval PT, DPT

## 2024-04-10 ENCOUNTER — CLINICAL SUPPORT (OUTPATIENT)
Dept: REHABILITATION | Facility: HOSPITAL | Age: 74
End: 2024-04-10
Payer: MEDICARE

## 2024-04-10 DIAGNOSIS — M25.512 ACUTE PAIN OF LEFT SHOULDER: Primary | ICD-10-CM

## 2024-04-10 PROCEDURE — 97112 NEUROMUSCULAR REEDUCATION: CPT

## 2024-04-10 PROCEDURE — 97110 THERAPEUTIC EXERCISES: CPT

## 2024-04-10 NOTE — PROGRESS NOTES
[facility]   Physical Therapy Treatment Note      Name: Marcos Barfield  [facility] Number: [id number]    Therapy Diagnosis:        Encounter Diagnosis   Name Primary?    Non-traumatic rotator cuff tear, left       Physician: Nhan Grullon III, *    Physician Orders: PT Eval and Treat  Medical Diagnosis from Referral:   M75.102 (ICD-10-CM) - Non-traumatic rotator cuff tear, left      Evaluation Date: 3/22/2024  Authorization Period Expiration: 12/31/2024  Plan of Care Expiration: 9/30/24  Progress Note Due: 4/22/24  Date of Surgery: 3/12/24  Visit # / Visits authorized: 3/20  FOTO: 1/ 3     Precautions:   POSTOPERATIVE PLAN: We will follow the arthroscopic rotator cuff repair guidelines for a small size rotator cuff tear.  We discussed with the patient's family after surgery.  The patient will remain in a sling for 6 weeks.  PT to start at 1-2 weeks.     Cuff specific program:  Pendulum exercises and Codman's exercises in 5-7 days, protecting rotator cuff repair for 1 week by avoiding active motion program until 1 week.     PASSIVE ROM: ER side 30 degrees, Forward Flex 90 degrees, ABD - 60 degrees   Full AAROM/PROM starting at 5-7 days as tolerated       Time In: 10:00 am  Time Out: 10:55 am  Total Billable Time: 30 minutes one on one   Visit Date: 4/10/2024    Subjective     Patient reports: States shoulder is doing well, just feels slight pain at front of shoulder and only feels pain when he starts to move his shoulder. HEP is going well.  He was compliant with home exercise program.  Response to previous treatment: Did well after evaluation  Functional change: ongoing    Pain: 2/10  Location: left shoulder      Objective    DOS: 3/12/24  POD: 4 weeks, 1 day    Observation: wearing ABD sling in proper position     Posture: rounded shoulders      Shoulder Passive Range of Motion:   Shoulder Right Left   Flexion       ER at 0    WNL 52   AAROM flexion 150     Joint Mobility: NT due to  secondary post op status       Treatment     Marcos received the treatments listed below:      therapeutic exercises to develop strength, endurance, and ROM for 20 minutes including:  AAROM ER at 0 x30  Chest press AAROM 2x10  Pulleys scaption 5 min   AAROM scaption c mirror FB 2x10  Landmine Press 2x10    NP:  Chest press with slight shoulder flexion 2x10  Scap squeezes 3s x20    manual therapy techniques: Joint mobilizations and Soft tissue Mobilization were applied to the: L shoulder for 20 minutes, including:  PROM check ER at 0/Flexion  Shoulder assessment  PROM shoulder flex/scaption 5sx15  Elbow ext stretch 15s x3  Gentle GH AP     neuromuscular re-education activities to improve: Balance, Coordination, and Proprioception for 15 minutes. The following activities were included:  Supine shoulder flexion AAROM- 3x10  Sidelying serratus punch c dowel and slight scaption raise 2x10    NP:  Physio ball roll out flexion - 2x10   Table walk backs x20  Shoulder Iso at wall x10      Patient Education and Home Exercises       Education provided:   - POC  - HEP modifications    Written Home Exercises Provided: Patient instructed to cont prior HEP. Exercises were reviewed and Marcos was able to demonstrate them prior to the end of the session.  Marcos demonstrated good  understanding of the education provided. See Electronic Medical Record under Patient Instructions for exercises provided during therapy sessions    Assessment     Pt is doing well and continuing to demo good ROM carryover from previous sessions. Shoulder flexion at start of treatment was roughly 90 degrees, by end of session ROM improved to 130 with towel between shoulder blades. Pt did well with AAROM shoulder flexion and scaption in front of mirror. Added exercises today without issues, focused on serratus strengthening. Will continue to progress pt as tolerated and per protocol.     Marcos Is progressing well towards his goals.   Patient prognosis is Good.      Patient will continue to benefit from skilled outpatient physical therapy to address the deficits listed in the problem list box on initial evaluation, provide pt/family education and to maximize pt's level of independence in the home and community environment.     Patient's spiritual, cultural and educational needs considered and pt agreeable to plan of care and goals.     Anticipated barriers to physical therapy: advanced age    Goals:   Post OP shoulder Goals  Short Term Goals (4 Weeks):   1. Pt will be independent with HEP to supplement PT in improving functional use of R UE.  2. Pt will increase pain free left shoulder elevation PROM to >/= 160 deg to improve functional mobility of UE  3. Pt will increase L shoulder ER PROM in 90 deg abduction to >/=20 deg to improve functional mobility of UE  4. Pt will increase L elbow AAROM to WNL deg in 4 weeks to improve functional mobility of UE.     Long Term Goals (24 Weeks):   1. Pt will improve FOTO score to </=30% disability limited to decrease perceived limitation with L shoulder carrying, moving, and handling objects.  2. Pt will increase L shoulder PROM to WNL in all planes to improve functional use of R RUE.  3. Pt will increase L shoulder AROM to WFL in all planes to improve functional use of R RUE.  4. Pt will improve L shoulder MMTs in areas of limitation to promote equal use of B UEs in performing functional tasks.  5. Pt will lift 10 lb objects without pain to promote functional QOL.    Plan     Plan of care Certification: 3/22/2024 to 9/30/24.     Outpatient Physical Therapy 1-2 times weekly for 24 weeks to include the following interventions: Gait Training, Manual Therapy, Neuromuscular Re-ed, Therapeutic Activities, and Therapeutic Exercise.     Lucy Fraser, SPT   I certify that I was present in the room directing the student in service delivery and guiding them using my skilled judgment.  As the co-signing therapist I have reviewed the students  documentation and am responsible for the treatment, assessment, and plan   Wayne Sandoval PT, DPT

## 2024-04-12 ENCOUNTER — CLINICAL SUPPORT (OUTPATIENT)
Dept: REHABILITATION | Facility: HOSPITAL | Age: 74
End: 2024-04-12
Payer: MEDICARE

## 2024-04-12 DIAGNOSIS — M25.512 ACUTE PAIN OF LEFT SHOULDER: Primary | ICD-10-CM

## 2024-04-12 PROCEDURE — 97112 NEUROMUSCULAR REEDUCATION: CPT

## 2024-04-12 PROCEDURE — 97110 THERAPEUTIC EXERCISES: CPT

## 2024-04-12 NOTE — PROGRESS NOTES
[facility]   Physical Therapy Treatment Note      Name: Marcos Barfield  Clinic Number: [id number]    Therapy Diagnosis:        Encounter Diagnosis   Name Primary?    Non-traumatic rotator cuff tear, left       Physician: Nhan Grullon III, *    Physician Orders: PT Eval and Treat  Medical Diagnosis from Referral:   M75.102 (ICD-10-CM) - Non-traumatic rotator cuff tear, left      Evaluation Date: 3/22/2024  Authorization Period Expiration: 12/31/2024  Plan of Care Expiration: 9/30/24  Progress Note Due: 4/22/24  Date of Surgery: 3/12/24  Visit # / Visits authorized: 3/20  FOTO: 1/ 3     Precautions:   POSTOPERATIVE PLAN: We will follow the arthroscopic rotator cuff repair guidelines for a small size rotator cuff tear.  We discussed with the patient's family after surgery.  The patient will remain in a sling for 6 weeks.  PT to start at 1-2 weeks.     Cuff specific program:  Pendulum exercises and Codman's exercises in 5-7 days, protecting rotator cuff repair for 1 week by avoiding active motion program until 1 week.     PASSIVE ROM: ER side 30 degrees, Forward Flex 90 degrees, ABD - 60 degrees   Full AAROM/PROM starting at 5-7 days as tolerated       Time In: 10:00 am  Time Out: 10:55 am  Total Billable Time: 38 minutes one on one   Visit Date: 4/12/2024    Subjective     Patient reports: he was a little sore   He was compliant with home exercise program.  Response to previous treatment: Did well after evaluation  Functional change: ongoing    Pain: 2/10  Location: left shoulder      Objective    DOS: 3/12/24  POD: 4 weeks, 1 day    Observation: wearing ABD sling in proper position     Posture: rounded shoulders      Shoulder Passive Range of Motion:   Shoulder Right Left   Flexion       ER at 0    WNL 55   AAROM flexion 150     Joint Mobility: NT due to secondary post op status       Treatment     Marcos received the treatments listed below:      therapeutic exercises to develop  strength, endurance, and ROM for 25 minutes including:  Chest press AAROM 3x10  Pulleys scaption 5 min   AAROM scaption c mirror FB 2x10  Landmine Press 3x10    NP:  Chest press with slight shoulder flexion 2x10  Scap squeezes 3s x20    manual therapy techniques: Joint mobilizations and Soft tissue Mobilization were applied to the: L shoulder for 15 minutes, including:  PROM check ER at 0/Flexion  Shoulder assessment  PROM shoulder flex/scaption 5sx15  Elbow ext stretch 15s x3  Gentle GH AP     neuromuscular re-education activities to improve: Balance, Coordination, and Proprioception for 15 minutes. The following activities were included:  Supine shoulder flexion AROM- 3x10  Standing AROM 0-75 - 2x5  Wall slides 2x10     NP:  Physio ball roll out flexion - 2x10   Table walk backs x20  Shoulder Iso at wall x10      Patient Education and Home Exercises       Education provided:   - POC  - HEP modifications    Written Home Exercises Provided: Patient instructed to cont prior HEP. Exercises were reviewed and Marcos was able to demonstrate them prior to the end of the session.  Marcos demonstrated good  understanding of the education provided. See Electronic Medical Record under Patient Instructions for exercises provided during therapy sessions    Assessment     Pt is doing well and continuing to demo good ROM carryover from previous sessions. Progressed Passive ROM to 140 degrees flexion, but still guarded. TRAVIS is progressing well.    Marcos Is progressing well towards his goals.   Patient prognosis is Good.     Patient will continue to benefit from skilled outpatient physical therapy to address the deficits listed in the problem list box on initial evaluation, provide pt/family education and to maximize pt's level of independence in the home and community environment.     Patient's spiritual, cultural and educational needs considered and pt agreeable to plan of care and goals.     Anticipated barriers to physical  therapy: advanced age    Goals:   Post OP shoulder Goals  Short Term Goals (4 Weeks):   1. Pt will be independent with HEP to supplement PT in improving functional use of R UE.  2. Pt will increase pain free left shoulder elevation PROM to >/= 160 deg to improve functional mobility of UE  3. Pt will increase L shoulder ER PROM in 90 deg abduction to >/=20 deg to improve functional mobility of UE  4. Pt will increase L elbow AAROM to WNL deg in 4 weeks to improve functional mobility of UE.     Long Term Goals (24 Weeks):   1. Pt will improve FOTO score to </=30% disability limited to decrease perceived limitation with L shoulder carrying, moving, and handling objects.  2. Pt will increase L shoulder PROM to WNL in all planes to improve functional use of R RUE.  3. Pt will increase L shoulder AROM to WFL in all planes to improve functional use of R RUE.  4. Pt will improve L shoulder MMTs in areas of limitation to promote equal use of B UEs in performing functional tasks.  5. Pt will lift 10 lb objects without pain to promote functional QOL.    Plan     Plan of care Certification: 3/22/2024 to 9/30/24.     Outpatient Physical Therapy 1-2 times weekly for 24 weeks to include the following interventions: Gait Training, Manual Therapy, Neuromuscular Re-ed, Therapeutic Activities, and Therapeutic Exercise.     Wayne Sandoval, PT

## 2024-04-17 ENCOUNTER — CLINICAL SUPPORT (OUTPATIENT)
Dept: REHABILITATION | Facility: HOSPITAL | Age: 74
End: 2024-04-17
Payer: MEDICARE

## 2024-04-17 DIAGNOSIS — M25.512 ACUTE PAIN OF LEFT SHOULDER: Primary | ICD-10-CM

## 2024-04-17 PROCEDURE — 97112 NEUROMUSCULAR REEDUCATION: CPT

## 2024-04-17 PROCEDURE — 97140 MANUAL THERAPY 1/> REGIONS: CPT

## 2024-04-17 PROCEDURE — 97110 THERAPEUTIC EXERCISES: CPT

## 2024-04-17 NOTE — PROGRESS NOTES
[facility]   Physical Therapy Treatment Note      Name: Marcos Barfield  [facility] Number: [id number]    Therapy Diagnosis:        Encounter Diagnosis   Name Primary?    Non-traumatic rotator cuff tear, left       Physician: Nhan Grullon III, *    Physician Orders: PT Eval and Treat  Medical Diagnosis from Referral:   M75.102 (ICD-10-CM) - Non-traumatic rotator cuff tear, left      Evaluation Date: 3/22/2024  Authorization Period Expiration: 12/31/2024  Plan of Care Expiration: 9/30/24  Progress Note Due: 4/22/24  Date of Surgery: 3/12/24  Visit # / Visits authorized: 3/20  FOTO: 1/ 3     Precautions:   POSTOPERATIVE PLAN: We will follow the arthroscopic rotator cuff repair guidelines for a small size rotator cuff tear.  We discussed with the patient's family after surgery.  The patient will remain in a sling for 6 weeks.  PT to start at 1-2 weeks.     Cuff specific program:  Pendulum exercises and Codman's exercises in 5-7 days, protecting rotator cuff repair for 1 week by avoiding active motion program until 1 week.     PASSIVE ROM: ER side 30 degrees, Forward Flex 90 degrees, ABD - 60 degrees   Full AAROM/PROM starting at 5-7 days as tolerated       Time In: 10:00 am  Time Out: 11:00 am  Total Billable Time: 60 minutes one on one   Visit Date: 4/17/2024    Subjective     Patient reports: States shoulder is feeling a little aggravated, feels it mostly when doing exercises 2/10. HEP is going well and doing them 2x a day  He was compliant with home exercise program.  Response to previous treatment: Did well after evaluation  Functional change: ongoing    Pain: 1/10  Location: left shoulder      Objective    DOS: 3/12/24  POD: 4 weeks, 1 day    Observation: wearing ABD sling in proper position     Posture: rounded shoulders      Shoulder Passive Range of Motion:   Shoulder Right Left   Flexion       ER at 0    WNL 55   AAROM flexion 150     Joint Mobility: NT due to secondary post op  status       Treatment     Marcos received the treatments listed below:      therapeutic exercises to develop strength, endurance, and ROM for 18 minutes including:  Chest press/shoulder flexion AAROM 3x10  Pulleys scaption 5 min   Scap squeezes 3s x20    NP:  AAROM scaption c mirror FB 2x10    manual therapy techniques: Joint mobilizations and Soft tissue Mobilization were applied to the: L shoulder for 18 minutes, including:  PROM check ER at 0/Flexion  Shoulder assessment  Elbow ext stretch 30s x3  Gentle GH AP     NP:  PROM shoulder flex/scaption 5sx15    neuromuscular re-education activities to improve: Balance, Coordination, and Proprioception for 23 minutes. The following activities were included:  Supine shoulder flexion AROM- 3x10  Standing AROM 0-75 - 2x5  Wall slides 3x10   Landmine Press 3x8  ER 0 isotonic OTB 4x5    NP:  Physio ball roll out flexion - 2x10   Table walk backs x20  Shoulder Iso at wall x10      Patient Education and Home Exercises       Education provided:   - POC  - HEP modifications    Written Home Exercises Provided: Patient instructed to cont prior HEP. Exercises were reviewed and Marcos was able to demonstrate them prior to the end of the session.  Marcos demonstrated good  understanding of the education provided. See Electronic Medical Record under Patient Instructions for exercises provided during therapy sessions    Assessment     Marcos is doing well, continues to present with low reports of pain in the shoulder. Pt had some discomfort in the shoulder primarily when doing HEP 2/10. Education on working within pain free range and ensuring a muscle burn. Elbow extension is still lacking a bit on the R. AAROM measured at 150 for shoulder flexion and 50 degrees ER. Will continue to progress pt as tolerated.    Marcos Is progressing well towards his goals.   Patient prognosis is Good.     Patient will continue to benefit from skilled outpatient physical therapy to address the deficits  listed in the problem list box on initial evaluation, provide pt/family education and to maximize pt's level of independence in the home and community environment.     Patient's spiritual, cultural and educational needs considered and pt agreeable to plan of care and goals.     Anticipated barriers to physical therapy: advanced age    Goals:   Post OP shoulder Goals  Short Term Goals (4 Weeks):   1. Pt will be independent with HEP to supplement PT in improving functional use of R UE.  2. Pt will increase pain free left shoulder elevation PROM to >/= 160 deg to improve functional mobility of UE  3. Pt will increase L shoulder ER PROM in 90 deg abduction to >/=20 deg to improve functional mobility of UE  4. Pt will increase L elbow AAROM to WNL deg in 4 weeks to improve functional mobility of UE.     Long Term Goals (24 Weeks):   1. Pt will improve FOTO score to </=30% disability limited to decrease perceived limitation with L shoulder carrying, moving, and handling objects.  2. Pt will increase L shoulder PROM to WNL in all planes to improve functional use of R RUE.  3. Pt will increase L shoulder AROM to WFL in all planes to improve functional use of R RUE.  4. Pt will improve L shoulder MMTs in areas of limitation to promote equal use of B UEs in performing functional tasks.  5. Pt will lift 10 lb objects without pain to promote functional QOL.    Plan     Plan of care Certification: 3/22/2024 to 9/30/24.     Outpatient Physical Therapy 1-2 times weekly for 24 weeks to include the following interventions: Gait Training, Manual Therapy, Neuromuscular Re-ed, Therapeutic Activities, and Therapeutic Exercise.     Wayne Sandoval, PT, DPT  I certify that I was present in the room directing the student in service delivery and guiding them using my skilled judgment.  As the co-signing therapist I have reviewed the students documentation and am responsible for the treatment, assessment, and plan    Lucy Fraser, SPT

## 2024-04-19 ENCOUNTER — CLINICAL SUPPORT (OUTPATIENT)
Dept: REHABILITATION | Facility: HOSPITAL | Age: 74
End: 2024-04-19
Payer: MEDICARE

## 2024-04-19 DIAGNOSIS — M25.512 ACUTE PAIN OF LEFT SHOULDER: Primary | ICD-10-CM

## 2024-04-19 PROCEDURE — 97112 NEUROMUSCULAR REEDUCATION: CPT

## 2024-04-19 NOTE — PROGRESS NOTES
[facility]   Physical Therapy Treatment Note      Name: Marcos Barfield  Clinic Number: [id number]    Therapy Diagnosis:        Encounter Diagnosis   Name Primary?    Non-traumatic rotator cuff tear, left       Physician: Nhan Grullon III, *    Physician Orders: PT Eval and Treat  Medical Diagnosis from Referral:   M75.102 (ICD-10-CM) - Non-traumatic rotator cuff tear, left      Evaluation Date: 3/22/2024  Authorization Period Expiration: 12/31/2024  Plan of Care Expiration: 9/30/24  Progress Note Due: 4/22/24  Date of Surgery: 3/12/24  Visit # / Visits authorized: 3/20  FOTO: 1/ 3     Precautions:   POSTOPERATIVE PLAN: We will follow the arthroscopic rotator cuff repair guidelines for a small size rotator cuff tear.  We discussed with the patient's family after surgery.  The patient will remain in a sling for 6 weeks.  PT to start at 1-2 weeks.     Cuff specific program:  Pendulum exercises and Codman's exercises in 5-7 days, protecting rotator cuff repair for 1 week by avoiding active motion program until 1 week.     PASSIVE ROM: ER side 30 degrees, Forward Flex 90 degrees, ABD - 60 degrees   Full AAROM/PROM starting at 5-7 days as tolerated       Time In: 9:50 am  Time Out: 10:50 am  Total Billable Time: 30 minutes one on one   Visit Date: 4/19/2024    Subjective     Patient reports:less sore today overall.   He was compliant with home exercise program.  Response to previous treatment: Did well after evaluation  Functional change: ongoing    Pain: 1/10  Location: left shoulder      Objective    DOS: 3/12/24  POD: 4 weeks, 1 day    Observation: wearing ABD sling in proper position     Posture: rounded shoulders      Shoulder Passive Range of Motion:   Shoulder Right Left   Flexion       ER at 0    WNL 55   AAROM flexion 150     Joint Mobility: NT due to secondary post op status       Treatment     Marcos received the treatments listed below:      therapeutic exercises to develop  strength, endurance, and ROM for 18 minutes including:  Chest press/shoulder flexion AAROM 3x10  Pulleys scaption 5 min   Scap squeezes 3s x30    NP:  AAROM scaption c mirror FB 2x10    manual therapy techniques: Joint mobilizations and Soft tissue Mobilization were applied to the: L shoulder for 18 minutes, including:  PROM check ER at 0/Flexion  Shoulder assessment  Elbow ext stretch 30s x3  Gentle GH AP     NP:  PROM shoulder flex/scaption 5sx15    neuromuscular re-education activities to improve: Balance, Coordination, and Proprioception for 24 minutes. The following activities were included:  Supine shoulder flexion AROM- 3x10  Standing AROM 0-75 - 2x5  Wall slides multi direction  3x10       NP:  Physio ball roll out flexion - 2x10   Table walk backs x20  Shoulder Iso at wall x10      Patient Education and Home Exercises       Education provided:   - POC  - HEP modifications    Written Home Exercises Provided: Patient instructed to cont prior HEP. Exercises were reviewed and Marcos was able to demonstrate them prior to the end of the session.  Marcos demonstrated good  understanding of the education provided. See Electronic Medical Record under Patient Instructions for exercises provided during therapy sessions    Assessment     Marcos is doing well. Less soreness today at the beginning of the session. ER ROM is excellent at this time. Passive flexion is also improving but still guarded. Sees Dr. Sherman next week.     Marcos Is progressing well towards his goals.   Patient prognosis is Good.     Patient will continue to benefit from skilled outpatient physical therapy to address the deficits listed in the problem list box on initial evaluation, provide pt/family education and to maximize pt's level of independence in the home and community environment.     Patient's spiritual, cultural and educational needs considered and pt agreeable to plan of care and goals.     Anticipated barriers to physical therapy: advanced  age    Goals:   Post OP shoulder Goals  Short Term Goals (4 Weeks):   1. Pt will be independent with HEP to supplement PT in improving functional use of R UE.  2. Pt will increase pain free left shoulder elevation PROM to >/= 160 deg to improve functional mobility of UE  3. Pt will increase L shoulder ER PROM in 90 deg abduction to >/=20 deg to improve functional mobility of UE  4. Pt will increase L elbow AAROM to WNL deg in 4 weeks to improve functional mobility of UE.     Long Term Goals (24 Weeks):   1. Pt will improve FOTO score to </=30% disability limited to decrease perceived limitation with L shoulder carrying, moving, and handling objects.  2. Pt will increase L shoulder PROM to WNL in all planes to improve functional use of R RUE.  3. Pt will increase L shoulder AROM to WFL in all planes to improve functional use of R RUE.  4. Pt will improve L shoulder MMTs in areas of limitation to promote equal use of B UEs in performing functional tasks.  5. Pt will lift 10 lb objects without pain to promote functional QOL.    Plan     Plan of care Certification: 3/22/2024 to 9/30/24.     Outpatient Physical Therapy 1-2 times weekly for 24 weeks to include the following interventions: Gait Training, Manual Therapy, Neuromuscular Re-ed, Therapeutic Activities, and Therapeutic Exercise.     Wayne Sandoval, PT

## 2024-04-24 ENCOUNTER — CLINICAL SUPPORT (OUTPATIENT)
Dept: REHABILITATION | Facility: HOSPITAL | Age: 74
End: 2024-04-24
Payer: MEDICARE

## 2024-04-24 ENCOUNTER — OFFICE VISIT (OUTPATIENT)
Dept: SPORTS MEDICINE | Facility: CLINIC | Age: 74
End: 2024-04-24
Payer: MEDICARE

## 2024-04-24 VITALS
HEART RATE: 65 BPM | BODY MASS INDEX: 23.01 KG/M2 | DIASTOLIC BLOOD PRESSURE: 77 MMHG | SYSTOLIC BLOOD PRESSURE: 153 MMHG | HEIGHT: 70 IN | WEIGHT: 160.69 LBS

## 2024-04-24 DIAGNOSIS — Z98.890 S/P ROTATOR CUFF SURGERY: Primary | ICD-10-CM

## 2024-04-24 DIAGNOSIS — M25.512 LEFT SHOULDER PAIN, UNSPECIFIED CHRONICITY: ICD-10-CM

## 2024-04-24 DIAGNOSIS — M25.512 ACUTE PAIN OF LEFT SHOULDER: Primary | ICD-10-CM

## 2024-04-24 PROCEDURE — 3077F SYST BP >= 140 MM HG: CPT | Mod: CPTII,S$GLB,, | Performed by: ORTHOPAEDIC SURGERY

## 2024-04-24 PROCEDURE — 97140 MANUAL THERAPY 1/> REGIONS: CPT

## 2024-04-24 PROCEDURE — 3078F DIAST BP <80 MM HG: CPT | Mod: CPTII,S$GLB,, | Performed by: ORTHOPAEDIC SURGERY

## 2024-04-24 PROCEDURE — 1159F MED LIST DOCD IN RCRD: CPT | Mod: CPTII,S$GLB,, | Performed by: ORTHOPAEDIC SURGERY

## 2024-04-24 PROCEDURE — 99024 POSTOP FOLLOW-UP VISIT: CPT | Mod: S$GLB,,, | Performed by: ORTHOPAEDIC SURGERY

## 2024-04-24 PROCEDURE — 97112 NEUROMUSCULAR REEDUCATION: CPT

## 2024-04-24 PROCEDURE — 3288F FALL RISK ASSESSMENT DOCD: CPT | Mod: CPTII,S$GLB,, | Performed by: ORTHOPAEDIC SURGERY

## 2024-04-24 PROCEDURE — 99999 PR PBB SHADOW E&M-EST. PATIENT-LVL III: CPT | Mod: PBBFAC,,, | Performed by: ORTHOPAEDIC SURGERY

## 2024-04-24 PROCEDURE — 97530 THERAPEUTIC ACTIVITIES: CPT

## 2024-04-24 PROCEDURE — 1101F PT FALLS ASSESS-DOCD LE1/YR: CPT | Mod: CPTII,S$GLB,, | Performed by: ORTHOPAEDIC SURGERY

## 2024-04-24 PROCEDURE — 1125F AMNT PAIN NOTED PAIN PRSNT: CPT | Mod: CPTII,S$GLB,, | Performed by: ORTHOPAEDIC SURGERY

## 2024-04-24 NOTE — PROGRESS NOTES
[facility]   Physical Therapy Treatment Note      Name: Marcos Barfield  Clinic Number: [id number]    Therapy Diagnosis:        Encounter Diagnosis   Name Primary?    Non-traumatic rotator cuff tear, left       Physician: Nhan Grullon III, *    Physician Orders: PT Eval and Treat  Medical Diagnosis from Referral:   M75.102 (ICD-10-CM) - Non-traumatic rotator cuff tear, left      Evaluation Date: 3/22/2024  Authorization Period Expiration: 12/31/2024  Plan of Care Expiration: 9/30/24  Progress Note Due: 4/22/24  Date of Surgery: 3/12/24  Visit # / Visits authorized: 3/20  FOTO: 1/ 3     Precautions:   POSTOPERATIVE PLAN: We will follow the arthroscopic rotator cuff repair guidelines for a small size rotator cuff tear.  We discussed with the patient's family after surgery.  The patient will remain in a sling for 6 weeks.  PT to start at 1-2 weeks.     Cuff specific program:  Pendulum exercises and Codman's exercises in 5-7 days, protecting rotator cuff repair for 1 week by avoiding active motion program until 1 week.     PASSIVE ROM: ER side 30 degrees, Forward Flex 90 degrees, ABD - 60 degrees   Full AAROM/PROM starting at 5-7 days as tolerated       Time In: 10:00 am  Time Out: 11:00 am  Total Billable Time: 60 minutes one on one   Visit Date: 4/24/2024    Subjective     Patient reports: see MD today. Was D/C from sling and told to still not lift anything over a pound   He was compliant with home exercise program.  Response to previous treatment: Did well after evaluation  Functional change: ongoing    Pain: 1/10  Location: left shoulder      Objective    DOS: 3/12/24  POD: 4 weeks, 1 day    Observation: wearing ABD sling in proper position     Posture: rounded shoulders      Shoulder Passive Range of Motion:   Shoulder Right Left   Flexion       ER at 0    WNL 55   AAROM flexion 150     Joint Mobility: NT due to secondary post op status       Treatment     Marcos received the  treatments listed below:      therapeutic exercises to develop strength, endurance, and ROM for 15 minutes including:  Shoulder assessment  Chest press/shoulder flexion AAROM 3x10  Pulleys scaption 5 min   Scap squeezes 3s x30    NP:  AAROM scaption c mirror FB 2x10    manual therapy techniques: Joint mobilizations and Soft tissue Mobilization were applied to the: L shoulder for 10 minutes, including:  PROM check ER at 0/Flexion  Shoulder assessment  Elbow ext stretch 30s x3  Gentle GH AP     NP:  PROM shoulder flex/scaption 5sx15    neuromuscular re-education activities to improve: Balance, Coordination, and Proprioception for 25 minutes. The following activities were included:  S/L scap protraction with MR 3x10  Standing AROM 0-90 - 4x5  Wall slides multi direction  3x10     therapeutic activities to improve functional performance for 10 minutes, including:  UBE 3/3   Landmine press 0# 3x10        Patient Education and Home Exercises       Education provided:   - POC  - HEP modifications    Written Home Exercises Provided: Patient instructed to cont prior HEP. Exercises were reviewed and Marcos was able to demonstrate them prior to the end of the session.  Marcos demonstrated good  understanding of the education provided. See Electronic Medical Record under Patient Instructions for exercises provided during therapy sessions    Assessment     Marcos is doing well. Able to actively raise with min compensations to 90 degrees flexion at end of session. Still very guarded with motion at this time.     Marcos Is progressing well towards his goals.   Patient prognosis is Good.     Patient will continue to benefit from skilled outpatient physical therapy to address the deficits listed in the problem list box on initial evaluation, provide pt/family education and to maximize pt's level of independence in the home and community environment.     Patient's spiritual, cultural and educational needs considered and pt agreeable to  plan of care and goals.     Anticipated barriers to physical therapy: advanced age    Goals:   Post OP shoulder Goals  Short Term Goals (4 Weeks):   1. Pt will be independent with HEP to supplement PT in improving functional use of R UE.  2. Pt will increase pain free left shoulder elevation PROM to >/= 160 deg to improve functional mobility of UE  3. Pt will increase L shoulder ER PROM in 90 deg abduction to >/=20 deg to improve functional mobility of UE  4. Pt will increase L elbow AAROM to WNL deg in 4 weeks to improve functional mobility of UE.     Long Term Goals (24 Weeks):   1. Pt will improve FOTO score to </=30% disability limited to decrease perceived limitation with L shoulder carrying, moving, and handling objects.  2. Pt will increase L shoulder PROM to WNL in all planes to improve functional use of R RUE.  3. Pt will increase L shoulder AROM to WFL in all planes to improve functional use of R RUE.  4. Pt will improve L shoulder MMTs in areas of limitation to promote equal use of B UEs in performing functional tasks.  5. Pt will lift 10 lb objects without pain to promote functional QOL.    Plan     Plan of care Certification: 3/22/2024 to 9/30/24.     Outpatient Physical Therapy 1-2 times weekly for 24 weeks to include the following interventions: Gait Training, Manual Therapy, Neuromuscular Re-ed, Therapeutic Activities, and Therapeutic Exercise.     Wayne Sandoval, PT

## 2024-04-24 NOTE — PROGRESS NOTES
Chief Complaint: left shoulder pain    HISTORY OF PRESENT ILLNESS:   Pt is here today for post-operative followup of shoulder arthroscopy.  he is doing well.  We have reviewed patient's findings and discussed plan of care and future treatment options.      Patient has been attending physical therapy at the [facility] location, working with Wayne DOWNEY. He notes performing his HEP twice daily     No issues reported     Pain today 1.5/10    DATE OF PROCEDURE: 03/12/2024  SURGEON: Kayleen Sherman M.D.  OPERATION:   left  1. Shoulder arthroscopic rotator cuff repair (CPT 09106) with CuffMend () ([id number] 40% partial thickness bursal)  2. Shoulder arthroscopic biceps tenodesis (CPT 70263)  3. Shoulder arthroscopic subacromial decompression, bursectomy   4. Shoulder arthroscopic extensive debridement (anterior, posterior glenohumeral joint, subacromial space) (CPT 35778)  5. Shoulder arthroscopic labral debridement (CPT 23602)  6. Shoulder arthroscopic lysis of adhesions (CPT 68264)    Partial thickness 40% cuff tearing on bursal side was debrided with shaver and gently with thermal device.                                                                                  PHYSICAL EXAMINATION:     Incision sites healed well  No evidence of any erythema, infection or induration  elbow Range of motion full   No effusion  2+ Radial pulses  No swelling            ROM:      Forward Elevation: 70  ER: 40  IR: buttock    Strength  Scaption at 0 and 30: 5/5  ER: 5/5                                                                         ASSESSMENT:                                                                                                                                               1. Status post above, doing well.                                                                                                                               PLAN:                                                                                                                                                      1. Continue PT; case discussed with therapist   2. Emphasized scapular function.  3. I have discussed return to activity in detail.  4. Patient will see us back in 6 weeks.                                      5. All questions were answered, surgical technique was reviewed and interpreted, and patient should contact us with any questions or concerns in the interim.

## 2024-04-26 ENCOUNTER — CLINICAL SUPPORT (OUTPATIENT)
Dept: REHABILITATION | Facility: HOSPITAL | Age: 74
End: 2024-04-26
Payer: MEDICARE

## 2024-04-26 DIAGNOSIS — M25.512 ACUTE PAIN OF LEFT SHOULDER: Primary | ICD-10-CM

## 2024-04-26 PROCEDURE — 97530 THERAPEUTIC ACTIVITIES: CPT

## 2024-04-26 PROCEDURE — 97112 NEUROMUSCULAR REEDUCATION: CPT

## 2024-04-26 NOTE — PROGRESS NOTES
[facility]   Physical Therapy Treatment Note      Name: Marcos Barfield  Clinic Number: [id number]    Therapy Diagnosis:        Encounter Diagnosis   Name Primary?    Non-traumatic rotator cuff tear, left       Physician: Nhan Grullon III, *    Physician Orders: PT Eval and Treat  Medical Diagnosis from Referral:   M75.102 (ICD-10-CM) - Non-traumatic rotator cuff tear, left      Evaluation Date: 3/22/2024  Authorization Period Expiration: 12/31/2024  Plan of Care Expiration: 9/30/24  Progress Note Due: Performed 4/26/24  Date of Surgery: 3/12/24  Visit # / Visits authorized: 10/20  FOTO: 1/ 3     Precautions:   POSTOPERATIVE PLAN: We will follow the arthroscopic rotator cuff repair guidelines for a small size rotator cuff tear.  We discussed with the patient's family after surgery.  The patient will remain in a sling for 6 weeks.  PT to start at 1-2 weeks.     Cuff specific program:  Pendulum exercises and Codman's exercises in 5-7 days, protecting rotator cuff repair for 1 week by avoiding active motion program until 1 week.     PASSIVE ROM: ER side 30 degrees, Forward Flex 90 degrees, ABD - 60 degrees   Full AAROM/PROM starting at 5-7 days as tolerated       Time In: 10:00 am  Time Out: 11:00 am  Total Billable Time: 60 minutes one on one   Visit Date: 4/26/2024    Subjective     Patient reports: doing well since being out of the sling. Min soreness  He was compliant with home exercise program.  Response to previous treatment: Did well after evaluation  Functional change: ongoing    Pain: 1/10  Location: left shoulder      Objective    DOS: 3/12/24  POD: 4 weeks, 1 day    Observation: wearing ABD sling in proper position     Posture: rounded shoulders      Shoulder Passive Range of Motion:   Shoulder Right Left   Flexion       ER at 0    WNL 55   AAROM flexion 150     Joint Mobility: NT due to secondary post op status       Treatment     Marcos received the treatments listed  below:      therapeutic exercises to develop strength, endurance, and ROM for 15 minutes including:  Shoulder assessment  Chest press/shoulder flexion AAROM 3x10      NP:  AAROM scaption c mirror FB 2x10    manual therapy techniques: Joint mobilizations and Soft tissue Mobilization were applied to the: L shoulder for 10 minutes, including:  PROM check ER at 0/Flexion  Shoulder assessment  Elbow ext stretch 30s x3  Gentle GH AP     NP:  PROM shoulder flex/scaption 5sx15    neuromuscular re-education activities to improve: Balance, Coordination, and Proprioception for 25 minutes. The following activities were included:  S/L scap protraction with MR 3x10  Standing AROM 0-90 - 4x5  Wall slides multi direction  3x10   Prone rows 0# 2x10   Prone extension 0# 2x10     therapeutic activities to improve functional performance for 10 minutes, including:  UBE 3/3   Landmine press 0# 3x10        Patient Education and Home Exercises       Education provided:   - POC  - HEP modifications    Written Home Exercises Provided: Patient instructed to cont prior HEP. Exercises were reviewed and Marcos was able to demonstrate them prior to the end of the session.  Marcos demonstrated good  understanding of the education provided. See Electronic Medical Record under Patient Instructions for exercises provided during therapy sessions    Assessment     Marcos is doing well. Able to actively raise with min compensations to 90 degrees flexion at end of session. Still very guarded with motion at this time.     Marcos Is progressing well towards his goals.   Patient prognosis is Good.     Patient will continue to benefit from skilled outpatient physical therapy to address the deficits listed in the problem list box on initial evaluation, provide pt/family education and to maximize pt's level of independence in the home and community environment.     Patient's spiritual, cultural and educational needs considered and pt agreeable to plan of care and  goals.     Anticipated barriers to physical therapy: advanced age    Goals:   Post OP shoulder Goals  Short Term Goals (4 Weeks):   1. Pt will be independent with HEP to supplement PT in improving functional use of R UE.  2. Pt will increase pain free left shoulder elevation PROM to >/= 160 deg to improve functional mobility of UE  3. Pt will increase L shoulder ER PROM in 90 deg abduction to >/=20 deg to improve functional mobility of UE  4. Pt will increase L elbow AAROM to WNL deg in 4 weeks to improve functional mobility of UE.     Long Term Goals (24 Weeks):   1. Pt will improve FOTO score to </=30% disability limited to decrease perceived limitation with L shoulder carrying, moving, and handling objects.  2. Pt will increase L shoulder PROM to WNL in all planes to improve functional use of R RUE.  3. Pt will increase L shoulder AROM to WFL in all planes to improve functional use of R RUE.  4. Pt will improve L shoulder MMTs in areas of limitation to promote equal use of B UEs in performing functional tasks.  5. Pt will lift 10 lb objects without pain to promote functional QOL.    Plan     Plan of care Certification: 3/22/2024 to 9/30/24.     Outpatient Physical Therapy 1-2 times weekly for 24 weeks to include the following interventions: Gait Training, Manual Therapy, Neuromuscular Re-ed, Therapeutic Activities, and Therapeutic Exercise.     Wayne Sandoval, PT

## 2024-05-01 ENCOUNTER — CLINICAL SUPPORT (OUTPATIENT)
Dept: REHABILITATION | Facility: HOSPITAL | Age: 74
End: 2024-05-01
Payer: MEDICARE

## 2024-05-01 DIAGNOSIS — M25.512 ACUTE PAIN OF LEFT SHOULDER: Primary | ICD-10-CM

## 2024-05-01 PROCEDURE — 97112 NEUROMUSCULAR REEDUCATION: CPT

## 2024-05-01 PROCEDURE — 97530 THERAPEUTIC ACTIVITIES: CPT

## 2024-05-01 NOTE — PROGRESS NOTES
[facility]   Physical Therapy Treatment Note      Name: Marcos Barfield  Clinic Number: [id number]    Therapy Diagnosis:        Encounter Diagnosis   Name Primary?    Non-traumatic rotator cuff tear, left       Physician: Nhan Grullon III, *    Physician Orders: PT Eval and Treat  Medical Diagnosis from Referral:   M75.102 (ICD-10-CM) - Non-traumatic rotator cuff tear, left      Evaluation Date: 3/22/2024  Authorization Period Expiration: 12/31/2024  Plan of Care Expiration: 9/30/24  Progress Note Due: Performed 4/26/24  Date of Surgery: 3/12/24  Visit # / Visits authorized: 11/20  FOTO: 1/ 3     Precautions:   POSTOPERATIVE PLAN: We will follow the arthroscopic rotator cuff repair guidelines for a small size rotator cuff tear.  We discussed with the patient's family after surgery.  The patient will remain in a sling for 6 weeks.  PT to start at 1-2 weeks.     Cuff specific program:  Pendulum exercises and Codman's exercises in 5-7 days, protecting rotator cuff repair for 1 week by avoiding active motion program until 1 week.     PASSIVE ROM: ER side 30 degrees, Forward Flex 90 degrees, ABD - 60 degrees   Full AAROM/PROM starting at 5-7 days as tolerated       Time In: 10:00 am  Time Out: 11:00 am  Total Billable Time: 30 minutes one on one   Visit Date: 5/1/2024    Subjective     Patient reports: upper trap is sore, shoulder is doing well   He was compliant with home exercise program.  Response to previous treatment: Did well after evaluation  Functional change: ongoing    Pain: 1/10  Location: left shoulder      Objective    DOS: 3/12/24  POW: 7 weeks    Observation: wearing ABD sling in proper position     Posture: rounded shoulders      Shoulder Passive Range of Motion:   Shoulder Right Left   Flexion       ER at 0    WNL 55   AAROM flexion 150     Joint Mobility: NT due to secondary post op status       Treatment     Marcos received the treatments listed below:      therapeutic  exercises to develop strength, endurance, and ROM for 00 minutes including:    NP:  AAROM scaption c mirror FB 2x10    manual therapy techniques: Joint mobilizations and Soft tissue Mobilization were applied to the: L shoulder for 10 minutes, including:  PROM check ER at 0/Flexion  GH AP, inferior Grades 1-2   Scap mobs       NP:  PROM shoulder flex/scaption 5sx15    neuromuscular re-education activities to improve: Balance, Coordination, and Proprioception for 35 minutes. The following activities were included:  S/L scap protraction with dowel 3x10  Supine serratus punch 3x10   Standing AROM 45-90 - 3x5  Prone rows 0# 2x10   Prone extension 0# 2x10     therapeutic activities to improve functional performance for 10 minutes, including:  UBE 3/3   Landmine press 0# 3x10        Patient Education and Home Exercises       Education provided:   - POC  - HEP modifications    Written Home Exercises Provided: Patient instructed to cont prior HEP. Exercises were reviewed and Marcos was able to demonstrate them prior to the end of the session.  Marcos demonstrated good  understanding of the education provided. See Electronic Medical Record under Patient Instructions for exercises provided during therapy sessions    Assessment     Marcos is doing well. Progressing nicely with AAROM exercises with improved ROM. VC/TC required for active shoulder raise. Still needs to work on scap mobility.     Marcos Is progressing well towards his goals.   Patient prognosis is Good.     Patient will continue to benefit from skilled outpatient physical therapy to address the deficits listed in the problem list box on initial evaluation, provide pt/family education and to maximize pt's level of independence in the home and community environment.     Patient's spiritual, cultural and educational needs considered and pt agreeable to plan of care and goals.     Anticipated barriers to physical therapy: advanced age    Goals:   Post OP shoulder  Goals  Short Term Goals (4 Weeks):   1. Pt will be independent with HEP to supplement PT in improving functional use of R UE.  2. Pt will increase pain free left shoulder elevation PROM to >/= 160 deg to improve functional mobility of UE  3. Pt will increase L shoulder ER PROM in 90 deg abduction to >/=20 deg to improve functional mobility of UE  4. Pt will increase L elbow AAROM to WNL deg in 4 weeks to improve functional mobility of UE.     Long Term Goals (24 Weeks):   1. Pt will improve FOTO score to </=30% disability limited to decrease perceived limitation with L shoulder carrying, moving, and handling objects.  2. Pt will increase L shoulder PROM to WNL in all planes to improve functional use of R RUE.  3. Pt will increase L shoulder AROM to WFL in all planes to improve functional use of R RUE.  4. Pt will improve L shoulder MMTs in areas of limitation to promote equal use of B UEs in performing functional tasks.  5. Pt will lift 10 lb objects without pain to promote functional QOL.    Plan     Plan of care Certification: 3/22/2024 to 9/30/24.     Outpatient Physical Therapy 1-2 times weekly for 24 weeks to include the following interventions: Gait Training, Manual Therapy, Neuromuscular Re-ed, Therapeutic Activities, and Therapeutic Exercise.     Wayne Sandoval, PT

## 2024-05-03 ENCOUNTER — CLINICAL SUPPORT (OUTPATIENT)
Dept: REHABILITATION | Facility: HOSPITAL | Age: 74
End: 2024-05-03
Payer: MEDICARE

## 2024-05-03 DIAGNOSIS — M25.512 ACUTE PAIN OF LEFT SHOULDER: Primary | ICD-10-CM

## 2024-05-03 PROCEDURE — 97112 NEUROMUSCULAR REEDUCATION: CPT

## 2024-05-06 NOTE — PROGRESS NOTES
[facility]   Physical Therapy Treatment Note      Name: Marcos Barfield  Clinic Number: [id number]    Therapy Diagnosis:        Encounter Diagnosis   Name Primary?    Non-traumatic rotator cuff tear, left       Physician: Nhan Grullon III, *    Physician Orders: PT Eval and Treat  Medical Diagnosis from Referral:   M75.102 (ICD-10-CM) - Non-traumatic rotator cuff tear, left      Evaluation Date: 3/22/2024  Authorization Period Expiration: 12/31/2024  Plan of Care Expiration: 9/30/24  Progress Note Due: Performed 4/26/24  Date of Surgery: 3/12/24  Visit # / Visits authorized: 11/20  FOTO: 1/ 3     Precautions:   POSTOPERATIVE PLAN: We will follow the arthroscopic rotator cuff repair guidelines for a small size rotator cuff tear.  We discussed with the patient's family after surgery.  The patient will remain in a sling for 6 weeks.  PT to start at 1-2 weeks.     Cuff specific program:  Pendulum exercises and Codman's exercises in 5-7 days, protecting rotator cuff repair for 1 week by avoiding active motion program until 1 week.     PASSIVE ROM: ER side 30 degrees, Forward Flex 90 degrees, ABD - 60 degrees   Full AAROM/PROM starting at 5-7 days as tolerated       Time In: 10:00 am  Time Out: 10:50 am  Total Billable Time: 15 minutes one on one   Visit Date: 5/3/2024    Subjective     Patient reports: upper trap is sore, shoulder is doing well   He was compliant with home exercise program.  Response to previous treatment: Did well after evaluation  Functional change: ongoing    Pain: 1/10  Location: left shoulder      Objective    DOS: 3/12/24  POW: 7 weeks    Observation: wearing ABD sling in proper position     Posture: rounded shoulders      Shoulder Passive Range of Motion:   Shoulder Right Left   Flexion       ER at 0    WNL 55   AAROM flexion 150     Joint Mobility: NT due to secondary post op status       Treatment     Marcos received the treatments listed below:      therapeutic  exercises to develop strength, endurance, and ROM for 00 minutes including:    NP:  AAROM scaption c mirror FB 2x10    manual therapy techniques: Joint mobilizations and Soft tissue Mobilization were applied to the: L shoulder for 10 minutes, including:  PROM check ER at 0/Flexion  GH AP, inferior Grades 1-2   Scap mobs       NP:  PROM shoulder flex/scaption 5sx15    neuromuscular re-education activities to improve: Balance, Coordination, and Proprioception for 35 minutes. The following activities were included:  S/L scap protraction with dowel 3x10  Supine serratus punch 3x10   Standing AROM 45-90 - 3x5  Prone rows 0# 2x10   Prone extension 0# 2x10     therapeutic activities to improve functional performance for 10 minutes, including:  UBE 3/3   Landmine press 0# 3x10        Patient Education and Home Exercises       Education provided:   - POC  - HEP modifications    Written Home Exercises Provided: Patient instructed to cont prior HEP. Exercises were reviewed and Marcos was able to demonstrate them prior to the end of the session.  Marcos demonstrated good  understanding of the education provided. See Electronic Medical Record under Patient Instructions for exercises provided during therapy sessions    Assessment     Marcos is doing well. Progressing nicely with AAROM exercises with improved ROM. VC/TC required for active shoulder raise. Still needs to work on scap mobility.     Marcos Is progressing well towards his goals.   Patient prognosis is Good.     Patient will continue to benefit from skilled outpatient physical therapy to address the deficits listed in the problem list box on initial evaluation, provide pt/family education and to maximize pt's level of independence in the home and community environment.     Patient's spiritual, cultural and educational needs considered and pt agreeable to plan of care and goals.     Anticipated barriers to physical therapy: advanced age    Goals:   Post OP shoulder  Goals  Short Term Goals (4 Weeks):   1. Pt will be independent with HEP to supplement PT in improving functional use of R UE.  2. Pt will increase pain free left shoulder elevation PROM to >/= 160 deg to improve functional mobility of UE  3. Pt will increase L shoulder ER PROM in 90 deg abduction to >/=20 deg to improve functional mobility of UE  4. Pt will increase L elbow AAROM to WNL deg in 4 weeks to improve functional mobility of UE.     Long Term Goals (24 Weeks):   1. Pt will improve FOTO score to </=30% disability limited to decrease perceived limitation with L shoulder carrying, moving, and handling objects.  2. Pt will increase L shoulder PROM to WNL in all planes to improve functional use of R RUE.  3. Pt will increase L shoulder AROM to WFL in all planes to improve functional use of R RUE.  4. Pt will improve L shoulder MMTs in areas of limitation to promote equal use of B UEs in performing functional tasks.  5. Pt will lift 10 lb objects without pain to promote functional QOL.    Plan     Plan of care Certification: 3/22/2024 to 9/30/24.     Outpatient Physical Therapy 1-2 times weekly for 24 weeks to include the following interventions: Gait Training, Manual Therapy, Neuromuscular Re-ed, Therapeutic Activities, and Therapeutic Exercise.     Wayne Sandoval, PT

## 2024-05-08 ENCOUNTER — CLINICAL SUPPORT (OUTPATIENT)
Dept: REHABILITATION | Facility: HOSPITAL | Age: 74
End: 2024-05-08
Payer: MEDICARE

## 2024-05-08 DIAGNOSIS — M25.512 ACUTE PAIN OF LEFT SHOULDER: Primary | ICD-10-CM

## 2024-05-08 PROCEDURE — 97112 NEUROMUSCULAR REEDUCATION: CPT

## 2024-05-08 NOTE — PROGRESS NOTES
[facility]   Physical Therapy Treatment Note      Name: Marcos Barfield  [facility] Number: [id number]    Therapy Diagnosis:        Encounter Diagnosis   Name Primary?    Non-traumatic rotator cuff tear, left       Physician: Nhan Grullon III, *    Physician Orders: PT Eval and Treat  Medical Diagnosis from Referral:   M75.102 (ICD-10-CM) - Non-traumatic rotator cuff tear, left      Evaluation Date: 3/22/2024  Authorization Period Expiration: 12/31/2024  Plan of Care Expiration: 9/30/24  Progress Note Due: Performed 4/26/24  Date of Surgery: 3/12/24  Visit # / Visits authorized: 11/20  FOTO: 1/ 3     Precautions:   POSTOPERATIVE PLAN: We will follow the arthroscopic rotator cuff repair guidelines for a small size rotator cuff tear.  We discussed with the patient's family after surgery.  The patient will remain in a sling for 6 weeks.  PT to start at 1-2 weeks.     Cuff specific program:  Pendulum exercises and Codman's exercises in 5-7 days, protecting rotator cuff repair for 1 week by avoiding active motion program until 1 week.     PASSIVE ROM: ER side 30 degrees, Forward Flex 90 degrees, ABD - 60 degrees   Full AAROM/PROM starting at 5-7 days as tolerated       Time In: 10:00 am  Time Out: 11:00 am  Total Billable Time: 30 minutes one on one   Visit Date: 5/8/2024    Subjective     Patient reports: He felt a sharp pain when he rolled over last night and now it is sore. Doing his HEP 2x/day and has upped his intensity on the pulleys    He was compliant with home exercise program.  Response to previous treatment: Did well after evaluation  Functional change: ongoing    Pain: 2/10  Location: left shoulder      Objective    DOS: 3/12/24  POW: 8 weeks    Observation: wearing ABD sling in proper position     Posture: rounded shoulders      Shoulder Passive Range of Motion:   Shoulder Right Left   Flexion       ER at 0    WNL 55   AAROM flexion 150     Joint Mobility: NT due to secondary  post op status       Treatment     Marcos received the treatments listed below:      therapeutic exercises to develop strength, endurance, and ROM for 00 minutes including:    NP:  AAROM scaption c mirror FB 2x10    manual therapy techniques: Joint mobilizations and Soft tissue Mobilization were applied to the: L shoulder for 10 minutes, including:  PROM check ER at 0/Flexion  GH AP, inferior Grades 1-2   Scap mobs       NP:  PROM shoulder flex/scaption 5sx15    neuromuscular re-education activities to improve: Balance, Coordination, and Proprioception for 35 minutes. The following activities were included:  S/L scap protraction with dowel 3x10  Supine serratus punch 3x10   Standing AROM 45-90 - 3x5  Prone rows 0# 2x10   Prone extension 0# 2x10     therapeutic activities to improve functional performance for 10 minutes, including:  Pulleys 4'   Landmine press 0# 3x10        Patient Education and Home Exercises       Education provided:   - POC  - HEP modifications    Written Home Exercises Provided: Patient instructed to cont prior HEP. Exercises were reviewed and Marcos was able to demonstrate them prior to the end of the session.  Marcos demonstrated good  understanding of the education provided. See Electronic Medical Record under Patient Instructions for exercises provided during therapy sessions    Assessment     Educated to reduce frequency on HEP as it seems like he may be pushing it a little too hard at home. Today we reduced overall intensity of program and just worked on pain free ROM. Will monitor symptoms accordingly     Marcos Is progressing well towards his goals.   Patient prognosis is Good.     Patient will continue to benefit from skilled outpatient physical therapy to address the deficits listed in the problem list box on initial evaluation, provide pt/family education and to maximize pt's level of independence in the home and community environment.     Patient's spiritual, cultural and educational  needs considered and pt agreeable to plan of care and goals.     Anticipated barriers to physical therapy: advanced age    Goals:   Post OP shoulder Goals  Short Term Goals (4 Weeks):   1. Pt will be independent with HEP to supplement PT in improving functional use of R UE.  2. Pt will increase pain free left shoulder elevation PROM to >/= 160 deg to improve functional mobility of UE  3. Pt will increase L shoulder ER PROM in 90 deg abduction to >/=20 deg to improve functional mobility of UE  4. Pt will increase L elbow AAROM to WNL deg in 4 weeks to improve functional mobility of UE.     Long Term Goals (24 Weeks):   1. Pt will improve FOTO score to </=30% disability limited to decrease perceived limitation with L shoulder carrying, moving, and handling objects.  2. Pt will increase L shoulder PROM to WNL in all planes to improve functional use of R RUE.  3. Pt will increase L shoulder AROM to WFL in all planes to improve functional use of R RUE.  4. Pt will improve L shoulder MMTs in areas of limitation to promote equal use of B UEs in performing functional tasks.  5. Pt will lift 10 lb objects without pain to promote functional QOL.    Plan     Plan of care Certification: 3/22/2024 to 9/30/24.     Outpatient Physical Therapy 1-2 times weekly for 24 weeks to include the following interventions: Gait Training, Manual Therapy, Neuromuscular Re-ed, Therapeutic Activities, and Therapeutic Exercise.     Wayne Sandoval, PT

## 2024-05-10 ENCOUNTER — CLINICAL SUPPORT (OUTPATIENT)
Dept: REHABILITATION | Facility: HOSPITAL | Age: 74
End: 2024-05-10
Payer: MEDICARE

## 2024-05-10 DIAGNOSIS — M25.512 ACUTE PAIN OF LEFT SHOULDER: Primary | ICD-10-CM

## 2024-05-10 PROCEDURE — 97530 THERAPEUTIC ACTIVITIES: CPT

## 2024-05-10 PROCEDURE — 97112 NEUROMUSCULAR REEDUCATION: CPT

## 2024-05-10 NOTE — PROGRESS NOTES
[facility]   Physical Therapy Treatment Note      Name: Marcos Barfield  [facility] Number: [id number]    Therapy Diagnosis:        Encounter Diagnosis   Name Primary?    Non-traumatic rotator cuff tear, left       Physician: Nhan Grullon III, *    Physician Orders: PT Eval and Treat  Medical Diagnosis from Referral:   M75.102 (ICD-10-CM) - Non-traumatic rotator cuff tear, left      Evaluation Date: 3/22/2024  Authorization Period Expiration: 12/31/2024  Plan of Care Expiration: 9/30/24  Progress Note Due: Performed 4/26/24  Date of Surgery: 3/12/24  Visit # / Visits authorized: 14/20  FOTO: 1/ 3     Precautions:   POSTOPERATIVE PLAN: We will follow the arthroscopic rotator cuff repair guidelines for a small size rotator cuff tear.  We discussed with the patient's family after surgery.  The patient will remain in a sling for 6 weeks.  PT to start at 1-2 weeks.     Cuff specific program:  Pendulum exercises and Codman's exercises in 5-7 days, protecting rotator cuff repair for 1 week by avoiding active motion program until 1 week.     PASSIVE ROM: ER side 30 degrees, Forward Flex 90 degrees, ABD - 60 degrees   Full AAROM/PROM starting at 5-7 days as tolerated       Time In: 9:40 am  Time Out: 10:40 am  Total Billable Time: 25 minutes one on one   Visit Date: 5/10/2024    Subjective     Patient reports: He felt a sharp pain when he rolled over last night and now it is sore. Doing his HEP 2x/day and has upped his intensity on the pulleys    He was compliant with home exercise program.  Response to previous treatment: Did well after evaluation  Functional change: ongoing    Pain: 2/10  Location: left shoulder      Objective    DOS: 3/12/24  POW: 8 weeks    Observation: wearing ABD sling in proper position     Posture: rounded shoulders      Shoulder Passive Range of Motion:   Shoulder Right Left   Flexion       ER at 0    WNL 55   AAROM flexion 150     Joint Mobility: NT due to secondary  post op status       Treatment     Marcos received the treatments listed below:      therapeutic exercises to develop strength, endurance, and ROM for 00 minutes including:    NP:  AAROM scaption c mirror FB 2x10    manual therapy techniques: Joint mobilizations and Soft tissue Mobilization were applied to the: L shoulder for 10 minutes, including:  PROM check ER at 0/Flexion  GH AP, inferior Grades 1-2   Scap mobs       NP:  PROM shoulder flex/scaption 5sx15    neuromuscular re-education activities to improve: Balance, Coordination, and Proprioception for 35 minutes. The following activities were included:  S/L scap protraction with dowel 3x10  S/L ER 4x5   Standing AAROM 100 degrees - 3x10   Prone rows 0# 2x10   Prone extension 0# 2x10     therapeutic activities to improve functional performance for 15 minutes, including:  UBE 4/4  Inclined Active flexion - 3x10         Patient Education and Home Exercises       Education provided:   - POC  - HEP modifications    Written Home Exercises Provided: Patient instructed to cont prior HEP. Exercises were reviewed and Marcos was able to demonstrate them prior to the end of the session.  Marcos demonstrated good  understanding of the education provided. See Electronic Medical Record under Patient Instructions for exercises provided during therapy sessions    Assessment     Educated to reduce frequency on HEP as it seems like he may be pushing it a little too hard at home. Today we reduced overall intensity of program and just worked on pain free ROM. Will monitor symptoms accordingly     Marcos Is progressing well towards his goals.   Patient prognosis is Good.     Patient will continue to benefit from skilled outpatient physical therapy to address the deficits listed in the problem list box on initial evaluation, provide pt/family education and to maximize pt's level of independence in the home and community environment.     Patient's spiritual, cultural and educational needs  considered and pt agreeable to plan of care and goals.     Anticipated barriers to physical therapy: advanced age    Goals:   Post OP shoulder Goals  Short Term Goals (4 Weeks):   1. Pt will be independent with HEP to supplement PT in improving functional use of R UE.  2. Pt will increase pain free left shoulder elevation PROM to >/= 160 deg to improve functional mobility of UE  3. Pt will increase L shoulder ER PROM in 90 deg abduction to >/=20 deg to improve functional mobility of UE  4. Pt will increase L elbow AAROM to WNL deg in 4 weeks to improve functional mobility of UE.     Long Term Goals (24 Weeks):   1. Pt will improve FOTO score to </=30% disability limited to decrease perceived limitation with L shoulder carrying, moving, and handling objects.  2. Pt will increase L shoulder PROM to WNL in all planes to improve functional use of R RUE.  3. Pt will increase L shoulder AROM to WFL in all planes to improve functional use of R RUE.  4. Pt will improve L shoulder MMTs in areas of limitation to promote equal use of B UEs in performing functional tasks.  5. Pt will lift 10 lb objects without pain to promote functional QOL.    Plan     Plan of care Certification: 3/22/2024 to 9/30/24.     Outpatient Physical Therapy 1-2 times weekly for 24 weeks to include the following interventions: Gait Training, Manual Therapy, Neuromuscular Re-ed, Therapeutic Activities, and Therapeutic Exercise.     Wayne Sandoval, PT

## 2024-05-15 ENCOUNTER — CLINICAL SUPPORT (OUTPATIENT)
Dept: REHABILITATION | Facility: HOSPITAL | Age: 74
End: 2024-05-15
Payer: MEDICARE

## 2024-05-15 DIAGNOSIS — M25.512 ACUTE PAIN OF LEFT SHOULDER: Primary | ICD-10-CM

## 2024-05-15 PROCEDURE — 97112 NEUROMUSCULAR REEDUCATION: CPT

## 2024-05-15 PROCEDURE — 97530 THERAPEUTIC ACTIVITIES: CPT

## 2024-05-15 NOTE — PROGRESS NOTES
[facility]   Physical Therapy Treatment Note      Name: Marcos Barfield  [facility] Number: [id number]    Therapy Diagnosis:        Encounter Diagnosis   Name Primary?    Non-traumatic rotator cuff tear, left       Physician: Nhan Grullon III, *    Physician Orders: PT Eval and Treat  Medical Diagnosis from Referral:   M75.102 (ICD-10-CM) - Non-traumatic rotator cuff tear, left      Evaluation Date: 3/22/2024  Authorization Period Expiration: 12/31/2024  Plan of Care Expiration: 9/30/24  Progress Note Due: Performed 4/26/24  Date of Surgery: 3/12/24  Visit # / Visits authorized: 15/20  FOTO: 1/ 3     Precautions:   POSTOPERATIVE PLAN: We will follow the arthroscopic rotator cuff repair guidelines for a small size rotator cuff tear.  We discussed with the patient's family after surgery.  The patient will remain in a sling for 6 weeks.  PT to start at 1-2 weeks.     Cuff specific program:  Pendulum exercises and Codman's exercises in 5-7 days, protecting rotator cuff repair for 1 week by avoiding active motion program until 1 week.     PASSIVE ROM: ER side 30 degrees, Forward Flex 90 degrees, ABD - 60 degrees   Full AAROM/PROM starting at 5-7 days as tolerated       Time In: 9:40 am  Time Out: 10:40 am  Total Billable Time: 25 minutes one on one   Visit Date: 5/15/2024    Subjective     Patient reports: He is still with the usual amount of soreness, but notes that his raising of his arm is much better.  He was compliant with home exercise program.  Response to previous treatment: Did well after evaluation  Functional change: ongoing    Pain: 2/10  Location: left shoulder      Objective    DOS: 3/12/24  POW: 9 weeks    Observation: wearing ABD sling in proper position     Posture: rounded shoulders      Shoulder Passive Range of Motion:   Shoulder Right Left   Flexion       ER at 0    WNL 55   AAROM flexion 150     Joint Mobility: NT due to secondary post op status       Treatment      Marcos received the treatments listed below:      therapeutic exercises to develop strength, endurance, and ROM for 00 minutes including:    NP:  AAROM scaption c mirror FB 2x10    manual therapy techniques: Joint mobilizations and Soft tissue Mobilization were applied to the: L shoulder for 14 minutes, including:  PROM check ER at 90/Flexion  GH AP, inferior Grades 1-2   Scap mobs       NP:  PROM shoulder flex/scaption 5sx15    neuromuscular re-education activities to improve: Balance, Coordination, and Proprioception for 23 minutes. The following activities were included:  S/L ER 3x8  ER/IR isotonic OTB - 3x10   Prone rows 2# 2x10   Inclined serratus punch 3x6  Rhythmic stabs 90 flexion and 90/0 ER 3x30s     therapeutic activities to improve functional performance for 23 minutes, including:  UBE 4/4  Inclined Active flexion - 4x5  Landmine press 3x12         Patient Education and Home Exercises       Education provided:   - POC  - HEP modifications    Written Home Exercises Provided: Patient instructed to cont prior HEP. Exercises were reviewed and Marcos was able to demonstrate them prior to the end of the session.  Marcos demonstrated good  understanding of the education provided. See Electronic Medical Record under Patient Instructions for exercises provided during therapy sessions    Assessment     Patient completed session as noted above. Continues to demo stiffness and weakness of the RTC as expected at this stage but overhead flexion actively is improving. Progressed to isotonics today which was van well. Will continue to progress as tolerated.      Marcos Is progressing well towards his goals.   Patient prognosis is Good.     Patient will continue to benefit from skilled outpatient physical therapy to address the deficits listed in the problem list box on initial evaluation, provide pt/family education and to maximize pt's level of independence in the home and community environment.     Patient's  spiritual, cultural and educational needs considered and pt agreeable to plan of care and goals.     Anticipated barriers to physical therapy: advanced age    Goals:   Post OP shoulder Goals  Short Term Goals (4 Weeks):   1. Pt will be independent with HEP to supplement PT in improving functional use of R UE.  2. Pt will increase pain free left shoulder elevation PROM to >/= 160 deg to improve functional mobility of UE  3. Pt will increase L shoulder ER PROM in 90 deg abduction to >/=20 deg to improve functional mobility of UE  4. Pt will increase L elbow AAROM to WNL deg in 4 weeks to improve functional mobility of UE.     Long Term Goals (24 Weeks):   1. Pt will improve FOTO score to </=30% disability limited to decrease perceived limitation with L shoulder carrying, moving, and handling objects.  2. Pt will increase L shoulder PROM to WNL in all planes to improve functional use of R RUE.  3. Pt will increase L shoulder AROM to WFL in all planes to improve functional use of R RUE.  4. Pt will improve L shoulder MMTs in areas of limitation to promote equal use of B UEs in performing functional tasks.  5. Pt will lift 10 lb objects without pain to promote functional QOL.    Plan     Plan of care Certification: 3/22/2024 to 9/30/24.     Outpatient Physical Therapy 1-2 times weekly for 24 weeks to include the following interventions: Gait Training, Manual Therapy, Neuromuscular Re-ed, Therapeutic Activities, and Therapeutic Exercise.     Wayne Sandoval, PT

## 2024-05-22 ENCOUNTER — CLINICAL SUPPORT (OUTPATIENT)
Dept: REHABILITATION | Facility: HOSPITAL | Age: 74
End: 2024-05-22
Payer: MEDICARE

## 2024-05-22 DIAGNOSIS — M25.512 ACUTE PAIN OF LEFT SHOULDER: Primary | ICD-10-CM

## 2024-05-22 PROCEDURE — 97112 NEUROMUSCULAR REEDUCATION: CPT

## 2024-05-22 PROCEDURE — 97530 THERAPEUTIC ACTIVITIES: CPT

## 2024-05-22 NOTE — PROGRESS NOTES
[facility]   Physical Therapy Treatment Note      Name: Marcos Barfield  [facility] Number: [id number]    Therapy Diagnosis:        Encounter Diagnosis   Name Primary?    Non-traumatic rotator cuff tear, left       Physician: Nhan Grullon III, *    Physician Orders: PT Eval and Treat  Medical Diagnosis from Referral:   M75.102 (ICD-10-CM) - Non-traumatic rotator cuff tear, left      Evaluation Date: 3/22/2024  Authorization Period Expiration: 12/31/2024  Plan of Care Expiration: 9/30/24  Progress Note Due: Performed 4/26/24  Date of Surgery: 3/12/24  Visit # / Visits authorized: 16/20  FOTO: 1/ 3     Precautions:   POSTOPERATIVE PLAN: We will follow the arthroscopic rotator cuff repair guidelines for a small size rotator cuff tear.  We discussed with the patient's family after surgery.  The patient will remain in a sling for 6 weeks.  PT to start at 1-2 weeks.     Cuff specific program:  Pendulum exercises and Codman's exercises in 5-7 days, protecting rotator cuff repair for 1 week by avoiding active motion program until 1 week.     PASSIVE ROM: ER side 30 degrees, Forward Flex 90 degrees, ABD - 60 degrees   Full AAROM/PROM starting at 5-7 days as tolerated       Time In: 9:40 am  Time Out: 10:40 am  Total Billable Time: 25 minutes one on one   Visit Date: 5/22/2024    Subjective     Patient reports: He is still with the usual amount of soreness, but notes that his raising of his arm is much better.  He was compliant with home exercise program.  Response to previous treatment: Did well after evaluation  Functional change: ongoing    Pain: 2/10  Location: left shoulder      Objective    DOS: 3/12/24  POW: 10 weeks    Observation: wearing ABD sling in proper position     Posture: rounded shoulders      Shoulder Passive Range of Motion:   Shoulder Right Left   Flexion       ER at 0    WNL 55   AROM flexion pre session: 95  AROM flexion post session: 105      Joint Mobility: NT        Treatment     Marcos received the treatments listed below:      therapeutic exercises to develop strength, endurance, and ROM for 00 minutes including:    manual therapy techniques: Joint mobilizations and Soft tissue Mobilization were applied to the: L shoulder for 00 minutes, including:    NP:  PROM check ER at 90/Flexion  GH AP, inferior Grades 1-2   Scap mobs   PROM shoulder flex/scaption 5sx15    neuromuscular re-education activities to improve: Balance, Coordination, and Proprioception for 27 minutes. The following activities were included:  S/L Abd 3x8  S/L flexion 3x8  ER/IR isotonic OTB - 3x10   Prone rows 2# 3x10   Prone ext 0# 2x10   Inclined serratus punch 3x10    therapeutic activities to improve functional performance for 23 minutes, including:  UBE 4/4  Inclined Active flexion - 4x5  Landmine press 3x12   Wall slides 3x10        Patient Education and Home Exercises       Education provided:   - POC  - HEP modifications    Written Home Exercises Provided: Patient instructed to cont prior HEP. Exercises were reviewed and Marcos was able to demonstrate them prior to the end of the session.  Marcos demonstrated good  understanding of the education provided. See Electronic Medical Record under Patient Instructions for exercises provided during therapy sessions    Assessment     Patient completed session as noted above. Active flexion is still stiff upon arrival, but AAROM is approaching full. After working on RTC and scap work in session, his ROM improve by 10 degrees. Overall doing well.       Marcos Is progressing well towards his goals.   Patient prognosis is Good.     Patient will continue to benefit from skilled outpatient physical therapy to address the deficits listed in the problem list box on initial evaluation, provide pt/family education and to maximize pt's level of independence in the home and community environment.     Patient's spiritual, cultural and educational needs considered and pt  agreeable to plan of care and goals.     Anticipated barriers to physical therapy: advanced age    Goals:   Post OP shoulder Goals  Short Term Goals (4 Weeks):   1. Pt will be independent with HEP to supplement PT in improving functional use of R UE.  2. Pt will increase pain free left shoulder elevation PROM to >/= 160 deg to improve functional mobility of UE  3. Pt will increase L shoulder ER PROM in 90 deg abduction to >/=20 deg to improve functional mobility of UE  4. Pt will increase L elbow AAROM to WNL deg in 4 weeks to improve functional mobility of UE.     Long Term Goals (24 Weeks):   1. Pt will improve FOTO score to </=30% disability limited to decrease perceived limitation with L shoulder carrying, moving, and handling objects.  2. Pt will increase L shoulder PROM to WNL in all planes to improve functional use of R RUE.  3. Pt will increase L shoulder AROM to WFL in all planes to improve functional use of R RUE.  4. Pt will improve L shoulder MMTs in areas of limitation to promote equal use of B UEs in performing functional tasks.  5. Pt will lift 10 lb objects without pain to promote functional QOL.    Plan     Plan of care Certification: 3/22/2024 to 9/30/24.     Outpatient Physical Therapy 1-2 times weekly for 24 weeks to include the following interventions: Gait Training, Manual Therapy, Neuromuscular Re-ed, Therapeutic Activities, and Therapeutic Exercise.     Wayne Sandoval, PT

## 2024-05-24 ENCOUNTER — CLINICAL SUPPORT (OUTPATIENT)
Dept: REHABILITATION | Facility: HOSPITAL | Age: 74
End: 2024-05-24
Payer: MEDICARE

## 2024-05-24 DIAGNOSIS — M25.512 ACUTE PAIN OF LEFT SHOULDER: Primary | ICD-10-CM

## 2024-05-24 PROCEDURE — 97530 THERAPEUTIC ACTIVITIES: CPT

## 2024-05-24 NOTE — PROGRESS NOTES
[facility]   Physical Therapy Treatment Note      Name: Marcos Barfield  [facility] Number: [id number]    Therapy Diagnosis:        Encounter Diagnosis   Name Primary?    Non-traumatic rotator cuff tear, left       Physician: Nhan Grullon III, *    Physician Orders: PT Eval and Treat  Medical Diagnosis from Referral:   M75.102 (ICD-10-CM) - Non-traumatic rotator cuff tear, left      Evaluation Date: 3/22/2024  Authorization Period Expiration: 12/31/2024  Plan of Care Expiration: 9/30/24  Progress Note Due: Performed 4/26/24  Date of Surgery: 3/12/24  Visit # / Visits authorized: 16/20  FOTO: 1/ 3     Precautions:   POSTOPERATIVE PLAN: We will follow the arthroscopic rotator cuff repair guidelines for a small size rotator cuff tear.  We discussed with the patient's family after surgery.  The patient will remain in a sling for 6 weeks.  PT to start at 1-2 weeks.     Cuff specific program:  Pendulum exercises and Codman's exercises in 5-7 days, protecting rotator cuff repair for 1 week by avoiding active motion program until 1 week.     PASSIVE ROM: ER side 30 degrees, Forward Flex 90 degrees, ABD - 60 degrees   Full AAROM/PROM starting at 5-7 days as tolerated       Time In: 10:00 am  Time Out: 11:00 am  Total Billable Time: 30 minutes one on one   Visit Date: 5/24/2024    Subjective     Patient reports: He is still with the usual amount of soreness, but notes that his raising of his arm is much better.  He was compliant with home exercise program.  Response to previous treatment: Did well after evaluation  Functional change: ongoing    Pain: 2/10  Location: left shoulder      Objective    DOS: 3/12/24  POW: 10 weeks    Observation: wearing ABD sling in proper position     Posture: rounded shoulders      Shoulder Passive Range of Motion:   Shoulder Right Left   Flexion       ER at 0    WNL 55   AROM flexion pre session: 95  AROM flexion post session: 105      Joint Mobility: NT        Treatment     Marcos received the treatments listed below:      therapeutic exercises to develop strength, endurance, and ROM for 00 minutes including:    manual therapy techniques: Joint mobilizations and Soft tissue Mobilization were applied to the: L shoulder for 00 minutes, including:    NP:  PROM check ER at 90/Flexion  GH AP, inferior Grades 1-2   Scap mobs   PROM shoulder flex/scaption 5sx15    neuromuscular re-education activities to improve: Balance, Coordination, and Proprioception for 27 minutes. The following activities were included:  S/L Abd 3x8  S/L flexion 3x8  ER/IR isotonic OTB - 3x10   Prone rows 2# 3x10   Prone ext 0# 2x10   Inclined serratus punch 3x10    therapeutic activities to improve functional performance for 23 minutes, including:  UBE 4/4  Inclined Active flexion - 4x5  Landmine press 3x12   Wall slides 3x10        Patient Education and Home Exercises       Education provided:   - POC  - HEP modifications    Written Home Exercises Provided: Patient instructed to cont prior HEP. Exercises were reviewed and Marcos was able to demonstrate them prior to the end of the session.  Marcos demonstrated good  understanding of the education provided. See Electronic Medical Record under Patient Instructions for exercises provided during therapy sessions    Assessment     Patient completed session as noted above. Active flexion is still stiff upon arrival, but AAROM is approaching full. After working on RTC and scap work in session, his ROM improve by 10 degrees. Overall doing well.       Marcos Is progressing well towards his goals.   Patient prognosis is Good.     Patient will continue to benefit from skilled outpatient physical therapy to address the deficits listed in the problem list box on initial evaluation, provide pt/family education and to maximize pt's level of independence in the home and community environment.     Patient's spiritual, cultural and educational needs considered and pt  agreeable to plan of care and goals.     Anticipated barriers to physical therapy: advanced age    Goals:   Post OP shoulder Goals  Short Term Goals (4 Weeks):   1. Pt will be independent with HEP to supplement PT in improving functional use of R UE.  2. Pt will increase pain free left shoulder elevation PROM to >/= 160 deg to improve functional mobility of UE  3. Pt will increase L shoulder ER PROM in 90 deg abduction to >/=20 deg to improve functional mobility of UE  4. Pt will increase L elbow AAROM to WNL deg in 4 weeks to improve functional mobility of UE.     Long Term Goals (24 Weeks):   1. Pt will improve FOTO score to </=30% disability limited to decrease perceived limitation with L shoulder carrying, moving, and handling objects.  2. Pt will increase L shoulder PROM to WNL in all planes to improve functional use of R RUE.  3. Pt will increase L shoulder AROM to WFL in all planes to improve functional use of R RUE.  4. Pt will improve L shoulder MMTs in areas of limitation to promote equal use of B UEs in performing functional tasks.  5. Pt will lift 10 lb objects without pain to promote functional QOL.    Plan     Plan of care Certification: 3/22/2024 to 9/30/24.     Outpatient Physical Therapy 1-2 times weekly for 24 weeks to include the following interventions: Gait Training, Manual Therapy, Neuromuscular Re-ed, Therapeutic Activities, and Therapeutic Exercise.     Wayne Sandoval, PT

## 2024-06-03 ENCOUNTER — CLINICAL SUPPORT (OUTPATIENT)
Dept: REHABILITATION | Facility: HOSPITAL | Age: 74
End: 2024-06-03
Payer: MEDICARE

## 2024-06-03 DIAGNOSIS — M25.512 ACUTE PAIN OF LEFT SHOULDER: Primary | ICD-10-CM

## 2024-06-03 PROCEDURE — 97140 MANUAL THERAPY 1/> REGIONS: CPT

## 2024-06-03 PROCEDURE — 97112 NEUROMUSCULAR REEDUCATION: CPT

## 2024-06-03 PROCEDURE — 97530 THERAPEUTIC ACTIVITIES: CPT

## 2024-06-03 NOTE — PROGRESS NOTES
[facility]   Physical Therapy Treatment Note      Name: Marcos Barfield  [facility] Number: [id number]    Therapy Diagnosis:        Encounter Diagnosis   Name Primary?    Non-traumatic rotator cuff tear, left       Physician: Nhan Grullon III, *    Physician Orders: PT Eval and Treat  Medical Diagnosis from Referral:   M75.102 (ICD-10-CM) - Non-traumatic rotator cuff tear, left      Evaluation Date: 3/22/2024  Authorization Period Expiration: 12/31/2024  Plan of Care Expiration: 9/30/24  Progress Note Due: Performed 4/26/24  Date of Surgery: 3/12/24  Visit # / Visits authorized: 18/30  FOTO: 1/ 3     Precautions:   POSTOPERATIVE PLAN: We will follow the arthroscopic rotator cuff repair guidelines for a small size rotator cuff tear.  We discussed with the patient's family after surgery.  The patient will remain in a sling for 6 weeks.  PT to start at 1-2 weeks.     Cuff specific program:  Pendulum exercises and Codman's exercises in 5-7 days, protecting rotator cuff repair for 1 week by avoiding active motion program until 1 week.     PASSIVE ROM: ER side 30 degrees, Forward Flex 90 degrees, ABD - 60 degrees   Full AAROM/PROM starting at 5-7 days as tolerated       Time In: 10:00 am  Time Out: 11:00 am  Total Billable Time: 60 minutes one on one   Visit Date: 6/3/2024    Subjective     Patient reports: He is still with the usual amount of soreness, but notes that his raising of his arm is much better.  He was compliant with home exercise program.  Response to previous treatment: Did well after evaluation  Functional change: ongoing    Pain: 2/10  Location: left shoulder      Objective    DOS: 3/12/24  POW: 10 weeks, 6 days     Observation: wearing ABD sling in proper position     Posture: rounded shoulders      Shoulder Passive Range of Motion:   Shoulder Right Left   Flexion       ER at 0    WNL 55   AROM flexion pre session: 95  AROM flexion post session: 105      Joint Mobility:  NT       Treatment     Marcos received the treatments listed below:      therapeutic exercises to develop strength, endurance, and ROM for 00 minutes including:      manual therapy techniques: Joint mobilizations and Soft tissue Mobilization were applied to the: L shoulder for 15 minutes, including:    PROM check ER at 90/Flexion  GH AP, inferior Grades 1-2   Scap mobs   PROM shoulder flex/scaption 5sx15    neuromuscular re-education activities to improve: Balance, Coordination, and Proprioception for 15 minutes. The following activities were included:  ER/IR isotonic OTB - 3x10   Prone rows 3# 3x10     therapeutic activities to improve functional performance for 30 minutes, including:  UBE 4/4  Standing flexion 1# - 3x8  Wall slides +2# 4x6        Patient Education and Home Exercises       Education provided:   - POC  - HEP modifications    Written Home Exercises Provided: Patient instructed to cont prior HEP. Exercises were reviewed and Marcos was able to demonstrate them prior to the end of the session.  Marcos demonstrated good  understanding of the education provided. See Electronic Medical Record under Patient Instructions for exercises provided during therapy sessions    Assessment     Patient completed session as noted above. Able to progress light resisted exercises today with excellent van. Active flexion is improving, but still weak past 90       Marcos Is progressing well towards his goals.   Patient prognosis is Good.     Patient will continue to benefit from skilled outpatient physical therapy to address the deficits listed in the problem list box on initial evaluation, provide pt/family education and to maximize pt's level of independence in the home and community environment.     Patient's spiritual, cultural and educational needs considered and pt agreeable to plan of care and goals.     Anticipated barriers to physical therapy: advanced age    Goals:   Post OP shoulder Goals  Short Term Goals (4  Weeks):   1. Pt will be independent with HEP to supplement PT in improving functional use of R UE.  2. Pt will increase pain free left shoulder elevation PROM to >/= 160 deg to improve functional mobility of UE  3. Pt will increase L shoulder ER PROM in 90 deg abduction to >/=20 deg to improve functional mobility of UE  4. Pt will increase L elbow AAROM to WNL deg in 4 weeks to improve functional mobility of UE.     Long Term Goals (24 Weeks):   1. Pt will improve FOTO score to </=30% disability limited to decrease perceived limitation with L shoulder carrying, moving, and handling objects.  2. Pt will increase L shoulder PROM to WNL in all planes to improve functional use of R RUE.  3. Pt will increase L shoulder AROM to WFL in all planes to improve functional use of R RUE.  4. Pt will improve L shoulder MMTs in areas of limitation to promote equal use of B UEs in performing functional tasks.  5. Pt will lift 10 lb objects without pain to promote functional QOL.    Plan     Plan of care Certification: 3/22/2024 to 9/30/24.     Outpatient Physical Therapy 1-2 times weekly for 24 weeks to include the following interventions: Gait Training, Manual Therapy, Neuromuscular Re-ed, Therapeutic Activities, and Therapeutic Exercise.     Wayne Sandoval, PT

## 2024-06-05 ENCOUNTER — CLINICAL SUPPORT (OUTPATIENT)
Dept: REHABILITATION | Facility: HOSPITAL | Age: 74
End: 2024-06-05
Payer: MEDICARE

## 2024-06-05 DIAGNOSIS — M25.512 ACUTE PAIN OF LEFT SHOULDER: Primary | ICD-10-CM

## 2024-06-05 PROCEDURE — 97530 THERAPEUTIC ACTIVITIES: CPT

## 2024-06-05 PROCEDURE — 97112 NEUROMUSCULAR REEDUCATION: CPT

## 2024-06-05 PROCEDURE — 97140 MANUAL THERAPY 1/> REGIONS: CPT

## 2024-06-05 NOTE — PROGRESS NOTES
[facility]   Physical Therapy Treatment Note      Name: Marcos Barfield  [facility] Number: [id number]    Therapy Diagnosis:        Encounter Diagnosis   Name Primary?    Non-traumatic rotator cuff tear, left       Physician: Nhan Grullon III, *    Physician Orders: PT Eval and Treat  Medical Diagnosis from Referral:   M75.102 (ICD-10-CM) - Non-traumatic rotator cuff tear, left      Evaluation Date: 3/22/2024  Authorization Period Expiration: 12/31/2024  Plan of Care Expiration: 9/30/24  Progress Note Due: Performed 4/26/24  Date of Surgery: 3/12/24  Visit # / Visits authorized: 18/30  FOTO: 1/ 3     Precautions:   POSTOPERATIVE PLAN: We will follow the arthroscopic rotator cuff repair guidelines for a small size rotator cuff tear.  We discussed with the patient's family after surgery.  The patient will remain in a sling for 6 weeks.  PT to start at 1-2 weeks.     Cuff specific program:  Pendulum exercises and Codman's exercises in 5-7 days, protecting rotator cuff repair for 1 week by avoiding active motion program until 1 week.     PASSIVE ROM: ER side 30 degrees, Forward Flex 90 degrees, ABD - 60 degrees   Full AAROM/PROM starting at 5-7 days as tolerated       Time In: 10:00 am  Time Out: 11:00 am  Total Billable Time: 60 minutes one on one   Visit Date: 6/5/2024    Subjective     Patient reports: He is still with the usual amount of soreness, but notes that his raising of his arm is much better.  He was compliant with home exercise program.  Response to previous treatment: Did well after evaluation  Functional change: ongoing    Pain: 2/10  Location: left shoulder      Objective    DOS: 3/12/24  POW: 10 weeks, 6 days     Observation: wearing ABD sling in proper position     Posture: rounded shoulders      Shoulder Passive Range of Motion:   Shoulder Right Left   Flexion       ER at 90    WNL 65     AROM flexion post session: 105      Joint Mobility: NT       Treatment     Jules  received the treatments listed below:      therapeutic exercises to develop strength, endurance, and ROM for 05 minutes including:  Gentle post cuff self stretch 3x20s    manual therapy techniques: Joint mobilizations and Soft tissue Mobilization were applied to the: L shoulder for 10 minutes, including:    PROM check ER at 90/Flexion  GH AP, inferior Grades 1-2   Posterior cuff gentle stretch 3x20    neuromuscular re-education activities to improve: Balance, Coordination, and Proprioception for 30 minutes. The following activities were included:  Serratus punch supine 1#   Serratus wall slide - 3x10   Prone middle trap lev 2 - 2x10    therapeutic activities to improve functional performance for 10 minutes, including:  UBE 4/4  Inclined flexion 0-120 1# - 3x5        Patient Education and Home Exercises       Education provided:   - POC  - HEP modifications    Written Home Exercises Provided: Patient instructed to cont prior HEP. Exercises were reviewed and Marcos was able to demonstrate them prior to the end of the session.  Marcos demonstrated good  understanding of the education provided. See Electronic Medical Record under Patient Instructions for exercises provided during therapy sessions    Assessment     Patient completed session as noted above. Noted posterior cuff tightness which is the likely reason he is unable to reach full ROM. Addressed this with manual therapy today which improved his range by 10 degrees pre and post. AROM is improving overall and we were able to add light weight to inclined shoulder flexion today. He is slowly progressing to be able to van this against gravity. Overall doing well.       Marcos Is progressing well towards his goals.   Patient prognosis is Good.     Patient will continue to benefit from skilled outpatient physical therapy to address the deficits listed in the problem list box on initial evaluation, provide pt/family education and to maximize pt's level of independence in  the home and community environment.     Patient's spiritual, cultural and educational needs considered and pt agreeable to plan of care and goals.     Anticipated barriers to physical therapy: advanced age    Goals:   Post OP shoulder Goals  Short Term Goals (4 Weeks):   1. Pt will be independent with HEP to supplement PT in improving functional use of R UE.  2. Pt will increase pain free left shoulder elevation PROM to >/= 160 deg to improve functional mobility of UE  3. Pt will increase L shoulder ER PROM in 90 deg abduction to >/=20 deg to improve functional mobility of UE  4. Pt will increase L elbow AAROM to WNL deg in 4 weeks to improve functional mobility of UE.     Long Term Goals (24 Weeks):   1. Pt will improve FOTO score to </=30% disability limited to decrease perceived limitation with L shoulder carrying, moving, and handling objects.  2. Pt will increase L shoulder PROM to WNL in all planes to improve functional use of R RUE.  3. Pt will increase L shoulder AROM to WFL in all planes to improve functional use of R RUE.  4. Pt will improve L shoulder MMTs in areas of limitation to promote equal use of B UEs in performing functional tasks.  5. Pt will lift 10 lb objects without pain to promote functional QOL.    Plan     Plan of care Certification: 3/22/2024 to 9/30/24.     Outpatient Physical Therapy 1-2 times weekly for 24 weeks to include the following interventions: Gait Training, Manual Therapy, Neuromuscular Re-ed, Therapeutic Activities, and Therapeutic Exercise.     Wayne Sandoval, PT

## 2024-06-10 ENCOUNTER — CLINICAL SUPPORT (OUTPATIENT)
Dept: REHABILITATION | Facility: HOSPITAL | Age: 74
End: 2024-06-10
Payer: MEDICARE

## 2024-06-10 DIAGNOSIS — M25.512 ACUTE PAIN OF LEFT SHOULDER: Primary | ICD-10-CM

## 2024-06-10 PROCEDURE — 97110 THERAPEUTIC EXERCISES: CPT

## 2024-06-10 PROCEDURE — 97140 MANUAL THERAPY 1/> REGIONS: CPT

## 2024-06-10 PROCEDURE — 97112 NEUROMUSCULAR REEDUCATION: CPT

## 2024-06-10 NOTE — PROGRESS NOTES
[facility]   Physical Therapy Treatment Note      Name: Marcos Barfield  [facility] Number: [id number]    Therapy Diagnosis:        Encounter Diagnosis   Name Primary?    Non-traumatic rotator cuff tear, left       Physician: Nhan Grullon III, *    Physician Orders: PT Eval and Treat  Medical Diagnosis from Referral:   M75.102 (ICD-10-CM) - Non-traumatic rotator cuff tear, left      Evaluation Date: 3/22/2024  Authorization Period Expiration: 12/31/2024  Plan of Care Expiration: 9/30/24  Progress Note Due: Performed 4/26/24  Date of Surgery: 3/12/24  Visit # / Visits authorized: 18/30  FOTO: 1/ 3     Precautions:   POSTOPERATIVE PLAN: We will follow the arthroscopic rotator cuff repair guidelines for a small size rotator cuff tear.  We discussed with the patient's family after surgery.  The patient will remain in a sling for 6 weeks.  PT to start at 1-2 weeks.     Cuff specific program:  Pendulum exercises and Codman's exercises in 5-7 days, protecting rotator cuff repair for 1 week by avoiding active motion program until 1 week.     PASSIVE ROM: ER side 30 degrees, Forward Flex 90 degrees, ABD - 60 degrees   Full AAROM/PROM starting at 5-7 days as tolerated       Time In: 10:00 am  Time Out: 11:00 am  Total Billable Time: 60 minutes one on one   Visit Date: 6/10/2024    Subjective     Patient reports: He is still with the usual amount of soreness, but notes that his raising of his arm is much better.  He was compliant with home exercise program.  Response to previous treatment: Did well after evaluation  Functional change: ongoing    Pain: 2/10  Location: left shoulder      Objective    DOS: 3/12/24  POW: 10 weeks, 6 days     Observation: wearing ABD sling in proper position     Posture: rounded shoulders      Shoulder Passive Range of Motion:   Shoulder Right Left   Flexion       ER at 90    WNL 65     AROM flexion post session: 105      Joint Mobility: NT       Treatment     Jules  received the treatments listed below:      therapeutic exercises to develop strength, endurance, and ROM for 00 minutes including:    manual therapy techniques: Joint mobilizations and Soft tissue Mobilization were applied to the: L shoulder for 15 minutes, including:    PROM check ER at 90/Flexion  GH AP, inferior Grades 1-2   Posterior cuff gentle stretch 3x20    neuromuscular re-education activities to improve: Balance, Coordination, and Proprioception for 15 minutes. The following activities were included:  S/L serratus punch with dowel - 2x10  Serratus wall slide - 3x10   Prone middle trap lev 2 - 2x10    therapeutic activities to improve functional performance for 30 minutes, including:  UBE 4/4  Supine flexion 2x10   Landmine press 1# - 3x10   Standing active scaption 3x8        Patient Education and Home Exercises       Education provided:   - POC  - HEP modifications    Written Home Exercises Provided: Patient instructed to cont prior HEP. Exercises were reviewed and Marcos was able to demonstrate them prior to the end of the session.  Marcos demonstrated good  understanding of the education provided. See Electronic Medical Record under Patient Instructions for exercises provided during therapy sessions    Assessment     Patient completed session as noted above. Pt arrived with 100 degrees of active motion today, but after the session he was measured at 120. Overall doing well.       Marcos Is progressing well towards his goals.   Patient prognosis is Good.     Patient will continue to benefit from skilled outpatient physical therapy to address the deficits listed in the problem list box on initial evaluation, provide pt/family education and to maximize pt's level of independence in the home and community environment.     Patient's spiritual, cultural and educational needs considered and pt agreeable to plan of care and goals.     Anticipated barriers to physical therapy: advanced age    Goals:   Post OP  shoulder Goals  Short Term Goals (4 Weeks):   1. Pt will be independent with HEP to supplement PT in improving functional use of R UE.  2. Pt will increase pain free left shoulder elevation PROM to >/= 160 deg to improve functional mobility of UE  3. Pt will increase L shoulder ER PROM in 90 deg abduction to >/=20 deg to improve functional mobility of UE  4. Pt will increase L elbow AAROM to WNL deg in 4 weeks to improve functional mobility of UE.     Long Term Goals (24 Weeks):   1. Pt will improve FOTO score to </=30% disability limited to decrease perceived limitation with L shoulder carrying, moving, and handling objects.  2. Pt will increase L shoulder PROM to WNL in all planes to improve functional use of R RUE.  3. Pt will increase L shoulder AROM to WFL in all planes to improve functional use of R RUE.  4. Pt will improve L shoulder MMTs in areas of limitation to promote equal use of B UEs in performing functional tasks.  5. Pt will lift 10 lb objects without pain to promote functional QOL.    Plan     Plan of care Certification: 3/22/2024 to 9/30/24.     Outpatient Physical Therapy 1-2 times weekly for 24 weeks to include the following interventions: Gait Training, Manual Therapy, Neuromuscular Re-ed, Therapeutic Activities, and Therapeutic Exercise.     Wayne Sandoval, PT

## 2024-06-12 ENCOUNTER — CLINICAL SUPPORT (OUTPATIENT)
Dept: REHABILITATION | Facility: HOSPITAL | Age: 74
End: 2024-06-12
Payer: MEDICARE

## 2024-06-12 DIAGNOSIS — M25.512 ACUTE PAIN OF LEFT SHOULDER: Primary | ICD-10-CM

## 2024-06-12 PROCEDURE — 97530 THERAPEUTIC ACTIVITIES: CPT

## 2024-06-12 NOTE — PROGRESS NOTES
[facility]   Physical Therapy Treatment Note      Name: Marcos Barfield  [facility] Number: [id number]    Therapy Diagnosis:        Encounter Diagnosis   Name Primary?    Non-traumatic rotator cuff tear, left       Physician: Nhan Grullon III, *    Physician Orders: PT Eval and Treat  Medical Diagnosis from Referral:   M75.102 (ICD-10-CM) - Non-traumatic rotator cuff tear, left      Evaluation Date: 3/22/2024  Authorization Period Expiration: 12/31/2024  Plan of Care Expiration: 9/30/24  Progress Note Due: Performed 4/26/24  Date of Surgery: 3/12/24  Visit # / Visits authorized: 21/30  FOTO: 1/ 3     Precautions:   POSTOPERATIVE PLAN: We will follow the arthroscopic rotator cuff repair guidelines for a small size rotator cuff tear.  We discussed with the patient's family after surgery.  The patient will remain in a sling for 6 weeks.  PT to start at 1-2 weeks.     Cuff specific program:  Pendulum exercises and Codman's exercises in 5-7 days, protecting rotator cuff repair for 1 week by avoiding active motion program until 1 week.     PASSIVE ROM: ER side 30 degrees, Forward Flex 90 degrees, ABD - 60 degrees   Full AAROM/PROM starting at 5-7 days as tolerated       Time In: 10:45 am  Time Out: 11:45 am  Total Billable Time: 60 minutes one on one   Visit Date: 6/12/2024    Subjective     Patient reports: He is still with the usual amount of soreness, but notes that his raising of his arm is much better.  He was compliant with home exercise program.  Response to previous treatment: Did well after evaluation  Functional change: ongoing    Pain: 2/10  Location: left shoulder      Objective    DOS: 3/12/24  POW: 10 weeks, 6 days     Observation: wearing ABD sling in proper position     Posture: rounded shoulders      Shoulder Passive Range of Motion:   Shoulder Right Left   Flexion       ER at 90    WNL 65     AROM flexion post session: 105      Joint Mobility: NT       Treatment     Jules  received the treatments listed below:      therapeutic exercises to develop strength, endurance, and ROM for 00 minutes including:    manual therapy techniques: Joint mobilizations and Soft tissue Mobilization were applied to the: L shoulder for 15 minutes, including:    PROM check ER at 90/Flexion  GH AP, inferior Grades 1-2   Posterior cuff gentle stretch 3x20    neuromuscular re-education activities to improve: Balance, Coordination, and Proprioception for 15 minutes. The following activities were included:  S/L serratus punch with dowel - 2x10  Serratus wall slide - 3x10   Prone middle trap lev 2 - 2x10    therapeutic activities to improve functional performance for 30 minutes, including:  UBE 4/4  Supine flexion 2x10   Landmine press 1# - 3x10   Standing active scaption 2# 3x8        Patient Education and Home Exercises       Education provided:   - POC  - HEP modifications    Written Home Exercises Provided: Patient instructed to cont prior HEP. Exercises were reviewed and Marcos was able to demonstrate them prior to the end of the session.  Marcos demonstrated good  understanding of the education provided. See Electronic Medical Record under Patient Instructions for exercises provided during therapy sessions    Assessment     Patient completed session as noted above. Able to progress weight with standing scaption today with fatigue but no pain. Overall doing well.       Marcos Is progressing well towards his goals.   Patient prognosis is Good.     Patient will continue to benefit from skilled outpatient physical therapy to address the deficits listed in the problem list box on initial evaluation, provide pt/family education and to maximize pt's level of independence in the home and community environment.     Patient's spiritual, cultural and educational needs considered and pt agreeable to plan of care and goals.     Anticipated barriers to physical therapy: advanced age    Goals:   Post OP shoulder Goals  Short  Term Goals (4 Weeks):   1. Pt will be independent with HEP to supplement PT in improving functional use of R UE.  2. Pt will increase pain free left shoulder elevation PROM to >/= 160 deg to improve functional mobility of UE  3. Pt will increase L shoulder ER PROM in 90 deg abduction to >/=20 deg to improve functional mobility of UE  4. Pt will increase L elbow AAROM to WNL deg in 4 weeks to improve functional mobility of UE.     Long Term Goals (24 Weeks):   1. Pt will improve FOTO score to </=30% disability limited to decrease perceived limitation with L shoulder carrying, moving, and handling objects.  2. Pt will increase L shoulder PROM to WNL in all planes to improve functional use of R RUE.  3. Pt will increase L shoulder AROM to WFL in all planes to improve functional use of R RUE.  4. Pt will improve L shoulder MMTs in areas of limitation to promote equal use of B UEs in performing functional tasks.  5. Pt will lift 10 lb objects without pain to promote functional QOL.    Plan     Plan of care Certification: 3/22/2024 to 9/30/24.     Outpatient Physical Therapy 1-2 times weekly for 24 weeks to include the following interventions: Gait Training, Manual Therapy, Neuromuscular Re-ed, Therapeutic Activities, and Therapeutic Exercise.     Wayne Sandoval, PT

## 2024-06-17 ENCOUNTER — CLINICAL SUPPORT (OUTPATIENT)
Dept: REHABILITATION | Facility: HOSPITAL | Age: 74
End: 2024-06-17
Payer: MEDICARE

## 2024-06-17 DIAGNOSIS — M25.512 ACUTE PAIN OF LEFT SHOULDER: Primary | ICD-10-CM

## 2024-06-17 PROCEDURE — 97530 THERAPEUTIC ACTIVITIES: CPT

## 2024-06-17 PROCEDURE — 97140 MANUAL THERAPY 1/> REGIONS: CPT

## 2024-06-17 PROCEDURE — 97112 NEUROMUSCULAR REEDUCATION: CPT

## 2024-06-17 NOTE — PROGRESS NOTES
[facility] OUTPATIENT THERAPY AND WELLNESS   Physical Therapy Treatment Note      Name: Marcos Barfield  Clinic Number: [id number]    Therapy Diagnosis:        Encounter Diagnosis   Name Primary?    Non-traumatic rotator cuff tear, left       Physician: Nhan Grullon III, *    Physician Orders: PT Eval and Treat  Medical Diagnosis from Referral:   M75.102 (ICD-10-CM) - Non-traumatic rotator cuff tear, left      Evaluation Date: 3/22/2024  Authorization Period Expiration: 12/31/2024  Plan of Care Expiration: 9/30/24  Progress Note Due: Performed 4/26/24  Date of Surgery: 3/12/24  Visit # / Visits authorized: 22/30  FOTO: 1/ 3     Precautions:   POSTOPERATIVE PLAN: We will follow the arthroscopic rotator cuff repair guidelines for a small size rotator cuff tear.  We discussed with the patient's family after surgery.  The patient will remain in a sling for 6 weeks.  PT to start at 1-2 weeks.     Cuff specific program:  Pendulum exercises and Codman's exercises in 5-7 days, protecting rotator cuff repair for 1 week by avoiding active motion program until 1 week.     PASSIVE ROM: ER side 30 degrees, Forward Flex 90 degrees, ABD - 60 degrees   Full AAROM/PROM starting at 5-7 days as tolerated       Time In: 10:55 am  Time Out: 11:55 am  Total Billable Time: 60 minutes one on one   Visit Date: 6/17/2024    Subjective     Patient reports: usual soreness but doing well. Andrew YI on Wednesday   He was compliant with home exercise program.  Response to previous treatment: Did well after evaluation  Functional change: ongoing    Pain: 2/10  Location: left shoulder      Objective    DOS: 3/12/24  POW: 13 weeks    Observation: wearing ABD sling in proper position     Posture: rounded shoulders      Shoulder Passive Range of Motion:   Shoulder Right Left   Flexion       ER at 90    WNL 65     AROM flexion post session: 130      Joint Mobility: NT       Treatment     Marcos received the treatments listed below:      therapeutic  exercises to develop strength, endurance, and ROM for 00 minutes including:    manual therapy techniques: Joint mobilizations and Soft tissue Mobilization were applied to the: L shoulder for 15 minutes, including:    PROM check ER at 90/Flexion  GH AP, inferior Grades 1-2   Posterior cuff gentle stretch 3x20  Shoulder blade mobs     neuromuscular re-education activities to improve: Balance, Coordination, and Proprioception for 30 minutes. The following activities were included:  S/L serratus punch with dowel - 2x10  Serratus wall slide - 3x10   Standing ER/IR GTB - 3x10   S/L ER 3# 3x8     therapeutic activities to improve functional performance for 15 minutes, including:  UBE 4/4  Wall slides +2# - 3x8          Patient Education and Home Exercises       Education provided:   - POC  - HEP modifications    Written Home Exercises Provided: Patient instructed to cont prior HEP. Exercises were reviewed and Marcos was able to demonstrate them prior to the end of the session.  Marcos demonstrated good  understanding of the education provided. See Electronic Medical Record under Patient Instructions for exercises provided during therapy sessions    Assessment     Patient completed session as noted above. Shoulder active flexion measured 130 at the end of the session. Still limited by post cuff tightness and weakness, but this is improving with each visit. Overall doing well. Will round with Dr. Sherman concerning this case.       Marcos Is progressing well towards his goals.   Patient prognosis is Good.     Patient will continue to benefit from skilled outpatient physical therapy to address the deficits listed in the problem list box on initial evaluation, provide pt/family education and to maximize pt's level of independence in the home and community environment.     Patient's spiritual, cultural and educational needs considered and pt agreeable to plan of care and goals.     Anticipated barriers to physical therapy: advanced  age    Goals:   Post OP shoulder Goals  Short Term Goals (4 Weeks):   1. Pt will be independent with HEP to supplement PT in improving functional use of R UE.  2. Pt will increase pain free left shoulder elevation PROM to >/= 160 deg to improve functional mobility of UE  3. Pt will increase L shoulder ER PROM in 90 deg abduction to >/=20 deg to improve functional mobility of UE  4. Pt will increase L elbow AAROM to WNL deg in 4 weeks to improve functional mobility of UE.     Long Term Goals (24 Weeks):   1. Pt will improve FOTO score to </=30% disability limited to decrease perceived limitation with L shoulder carrying, moving, and handling objects.  2. Pt will increase L shoulder PROM to WNL in all planes to improve functional use of R RUE.  3. Pt will increase L shoulder AROM to WFL in all planes to improve functional use of R RUE.  4. Pt will improve L shoulder MMTs in areas of limitation to promote equal use of B UEs in performing functional tasks.  5. Pt will lift 10 lb objects without pain to promote functional QOL.    Plan     Plan of care Certification: 3/22/2024 to 9/30/24.     Outpatient Physical Therapy 1-2 times weekly for 24 weeks to include the following interventions: Gait Training, Manual Therapy, Neuromuscular Re-ed, Therapeutic Activities, and Therapeutic Exercise.     Wayne Sandoval, PT

## 2024-06-19 ENCOUNTER — CLINICAL SUPPORT (OUTPATIENT)
Dept: REHABILITATION | Facility: HOSPITAL | Age: 74
End: 2024-06-19
Payer: MEDICARE

## 2024-06-19 DIAGNOSIS — M25.512 ACUTE PAIN OF LEFT SHOULDER: Primary | ICD-10-CM

## 2024-06-19 PROCEDURE — 97112 NEUROMUSCULAR REEDUCATION: CPT

## 2024-06-19 PROCEDURE — 97110 THERAPEUTIC EXERCISES: CPT

## 2024-06-19 NOTE — PROGRESS NOTES
[facility]   Physical Therapy Treatment Note      Name: Marcos Barfield  Clinic Number: [id number]    Therapy Diagnosis:        Encounter Diagnosis   Name Primary?    Non-traumatic rotator cuff tear, left       Physician: Nhan Grullon III, *    Physician Orders: PT Eval and Treat  Medical Diagnosis from Referral:   M75.102 (ICD-10-CM) - Non-traumatic rotator cuff tear, left      Evaluation Date: 3/22/2024  Authorization Period Expiration: 12/31/2024  Plan of Care Expiration: 9/30/24  Progress Note Due: Performed 4/26/24  Date of Surgery: 3/12/24  Visit # / Visits authorized: 23/30  FOTO: 1/ 3     Precautions:   POSTOPERATIVE PLAN: We will follow the arthroscopic rotator cuff repair guidelines for a small size rotator cuff tear.  We discussed with the patient's family after surgery.  The patient will remain in a sling for 6 weeks.  PT to start at 1-2 weeks.     Cuff specific program:  Pendulum exercises and Codman's exercises in 5-7 days, protecting rotator cuff repair for 1 week by avoiding active motion program until 1 week.     PASSIVE ROM: ER side 30 degrees, Forward Flex 90 degrees, ABD - 60 degrees   Full AAROM/PROM starting at 5-7 days as tolerated       Time In: 10:00 am  Time Out: 11:00 am  Total Billable Time: 60 minutes one on one   Visit Date: 6/19/2024    Subjective     Patient reports: his MD visit was moved to Friday   He was compliant with home exercise program.  Response to previous treatment: Did well after evaluation  Functional change: ongoing    Pain: 2/10  Location: left shoulder      Objective    DOS: 3/12/24  POW: 13 weeks    Observation: wearing ABD sling in proper position     Posture: rounded shoulders      Shoulder Passive Range of Motion:   Shoulder Right Left   Flexion       ER at 90    WNL 65     AROM flexion post session: 130      Joint Mobility: NT       Treatment     Marcos received the treatments listed below:      therapeutic exercises to develop  strength, endurance, and ROM for 00 minutes including:    manual therapy techniques: Joint mobilizations and Soft tissue Mobilization were applied to the: L shoulder for 12 minutes, including:    PROM check ER at 90/Flexion  GH AP, inferior Grades 1-2   Posterior cuff gentle stretch 3x20  Shoulder blade mobs     neuromuscular re-education activities to improve: Balance, Coordination, and Proprioception for 38 minutes. The following activities were included:  Seated ER 90/90 with arm supported 4x6 3#  Serratus wall slide - 3x10   Standing ER/IR GTB - 3x10   Spider man on wall YTB - 2x5  Serratus bear hug - 2x10 RTB  Prone middle trap lev II - 2x10     therapeutic activities to improve functional performance for 10 minutes, including:  UBE 4/4  Landmine press 4# 3x12          Patient Education and Home Exercises       Education provided:   - POC  - HEP modifications    Written Home Exercises Provided: Patient instructed to cont prior HEP. Exercises were reviewed and Marcos was able to demonstrate them prior to the end of the session.  Marcos demonstrated good  understanding of the education provided. See Electronic Medical Record under Patient Instructions for exercises provided during therapy sessions    Assessment     Patient completed session as noted above. Shoulder active flexion measured 130 at the end of the session. Still limited by post cuff tightness and weakness, but this is improving with each visit. Overall doing well. Rounded with Dr. Sherman concerning this case.       Marcos Is progressing well towards his goals.   Patient prognosis is Good.     Patient will continue to benefit from skilled outpatient physical therapy to address the deficits listed in the problem list box on initial evaluation, provide pt/family education and to maximize pt's level of independence in the home and community environment.     Patient's spiritual, cultural and educational needs considered and pt agreeable to plan of care and  goals.     Anticipated barriers to physical therapy: advanced age    Goals:   Post OP shoulder Goals  Short Term Goals (4 Weeks):   1. Pt will be independent with HEP to supplement PT in improving functional use of R UE.  2. Pt will increase pain free left shoulder elevation PROM to >/= 160 deg to improve functional mobility of UE  3. Pt will increase L shoulder ER PROM in 90 deg abduction to >/=20 deg to improve functional mobility of UE  4. Pt will increase L elbow AAROM to WNL deg in 4 weeks to improve functional mobility of UE.     Long Term Goals (24 Weeks):   1. Pt will improve FOTO score to </=30% disability limited to decrease perceived limitation with L shoulder carrying, moving, and handling objects.  2. Pt will increase L shoulder PROM to WNL in all planes to improve functional use of R RUE.  3. Pt will increase L shoulder AROM to WFL in all planes to improve functional use of R RUE.  4. Pt will improve L shoulder MMTs in areas of limitation to promote equal use of B UEs in performing functional tasks.  5. Pt will lift 10 lb objects without pain to promote functional QOL.    Plan     Plan of care Certification: 3/22/2024 to 9/30/24.     Outpatient Physical Therapy 1-2 times weekly for 24 weeks to include the following interventions: Gait Training, Manual Therapy, Neuromuscular Re-ed, Therapeutic Activities, and Therapeutic Exercise.     Wanye Sandoval, PT

## 2024-06-21 ENCOUNTER — OFFICE VISIT (OUTPATIENT)
Dept: SPORTS MEDICINE | Facility: CLINIC | Age: 74
End: 2024-06-21
Payer: MEDICARE

## 2024-06-21 VITALS
DIASTOLIC BLOOD PRESSURE: 76 MMHG | SYSTOLIC BLOOD PRESSURE: 151 MMHG | BODY MASS INDEX: 23.06 KG/M2 | HEIGHT: 70 IN | HEART RATE: 63 BPM

## 2024-06-21 DIAGNOSIS — M25.512 LEFT SHOULDER PAIN, UNSPECIFIED CHRONICITY: ICD-10-CM

## 2024-06-21 DIAGNOSIS — Z98.890 S/P ROTATOR CUFF SURGERY: Primary | ICD-10-CM

## 2024-06-21 PROCEDURE — 3077F SYST BP >= 140 MM HG: CPT | Mod: CPTII,S$GLB,, | Performed by: ORTHOPAEDIC SURGERY

## 2024-06-21 PROCEDURE — 99214 OFFICE O/P EST MOD 30 MIN: CPT | Mod: S$GLB,,, | Performed by: ORTHOPAEDIC SURGERY

## 2024-06-21 PROCEDURE — 1125F AMNT PAIN NOTED PAIN PRSNT: CPT | Mod: CPTII,S$GLB,, | Performed by: ORTHOPAEDIC SURGERY

## 2024-06-21 PROCEDURE — 1159F MED LIST DOCD IN RCRD: CPT | Mod: CPTII,S$GLB,, | Performed by: ORTHOPAEDIC SURGERY

## 2024-06-21 PROCEDURE — 3288F FALL RISK ASSESSMENT DOCD: CPT | Mod: CPTII,S$GLB,, | Performed by: ORTHOPAEDIC SURGERY

## 2024-06-21 PROCEDURE — 99999 PR PBB SHADOW E&M-EST. PATIENT-LVL III: CPT | Mod: PBBFAC,,, | Performed by: ORTHOPAEDIC SURGERY

## 2024-06-21 PROCEDURE — 3078F DIAST BP <80 MM HG: CPT | Mod: CPTII,S$GLB,, | Performed by: ORTHOPAEDIC SURGERY

## 2024-06-21 PROCEDURE — 1101F PT FALLS ASSESS-DOCD LE1/YR: CPT | Mod: CPTII,S$GLB,, | Performed by: ORTHOPAEDIC SURGERY

## 2024-06-21 NOTE — PROGRESS NOTES
Chief Complaint: left shoulder pain    HISTORY OF PRESENT ILLNESS:   Pt is here today for post-operative followup of shoulder arthroscopy.  he is doing well.  We have reviewed patient's findings and discussed plan of care and future treatment options.      Patient has been attending physical therapy at the [facility] location, working with Wayne DOWNEY. He notes performing his HEP daily     He notes that he still has some pain with certain movements and pain at night   No other issues reported     Pain today 1/10  SANE post op: 78  SANE pre op: 60     DATE OF PROCEDURE: 03/12/2024  SURGEON: Kayleen Sherman M.D.  OPERATION:   left  1. Shoulder arthroscopic rotator cuff repair (CPT 02513) with CuffMend () ([id number] 40% partial thickness bursal)  2. Shoulder arthroscopic biceps tenodesis (CPT 67364)  3. Shoulder arthroscopic subacromial decompression, bursectomy   4. Shoulder arthroscopic extensive debridement (anterior, posterior glenohumeral joint, subacromial space) (CPT 59149)  5. Shoulder arthroscopic labral debridement (CPT 98765)  6. Shoulder arthroscopic lysis of adhesions (CPT 59502)    Partial thickness 40% cuff tearing on bursal side was debrided with shaver and gently with thermal device.     Review of Systems   Constitution: Negative. Negative for chills, fever and night sweats.   HENT: Negative for congestion and headaches.    Eyes: Negative for blurred vision, left vision loss and right vision loss.   Cardiovascular: Negative for chest pain and syncope.   Respiratory: Negative for cough and shortness of breath.    Endocrine: Negative for polydipsia, polyphagia and polyuria.   Hematologic/Lymphatic: Negative for bleeding problem. Does not bruise/bleed easily.   Skin: Negative for dry skin, itching and rash.   Musculoskeletal: Negative for falls and muscle weakness.   Gastrointestinal: Negative for abdominal pain and bowel incontinence.   Genitourinary: Negative for bladder incontinence and nocturia.    Neurological: Negative for disturbances in coordination, loss of balance and seizures.   Psychiatric/Behavioral: Negative for depression. The patient does not have insomnia.    Allergic/Immunologic: Negative for hives and persistent infections.       PAST MEDICAL HISTORY:   Past Medical History:   Diagnosis Date    Anemia     kid     CKD (chronic kidney disease)     Diabetes mellitus     High cholesterol     Hypertension      PAST SURGICAL HISTORY:   Past Surgical History:   Procedure Laterality Date    ARTHROSCOPIC DEBRIDEMENT OF SHOULDER Right 8/20/2020    Procedure: DEBRIDEMENT, SHOULDER, ARTHROSCOPIC;  Surgeon: Kayleen Sherman MD;  Location: [facility] OR;  Service: Orthopedics;  Laterality: Right;    ARTHROSCOPIC REPAIR OF ROTATOR CUFF OF SHOULDER Right 8/20/2020    Procedure: REPAIR, ROTATOR CUFF, ARTHROSCOPIC;  Surgeon: Kayleen Sherman MD;  Location: [facility] OR;  Service: Orthopedics;  Laterality: Right;    ARTHROSCOPIC REPAIR OF ROTATOR CUFF OF SHOULDER Left 3/12/2024    Procedure: REPAIR, ROTATOR CUFF, ARTHROSCOPIC--with patch;  Surgeon: Kayleen Sherman MD;  Location: [facility] OR;  Service: Orthopedics;  Laterality: Left;    ARTHROSCOPY OF SHOULDER WITH DECOMPRESSION OF SUBACROMIAL SPACE Left 3/12/2024    Procedure: ARTHROSCOPY, SHOULDER, WITH SUBACROMIAL SPACE DECOMPRESSION;  Surgeon: Kayleen Sherman MD;  Location: [facility] OR;  Service: Orthopedics;  Laterality: Left;    ARTHROSCOPY OF SHOULDER WITH REMOVAL OF DISTAL CLAVICLE Right 8/20/2020    Procedure: ARTHROSCOPY, SHOULDER, WITH DISTAL CLAVICLE EXCISION;  Surgeon: Kayleen Sherman MD;  Location: [facility] OR;  Service: Orthopedics;  Laterality: Right;    COLONOSCOPY      x2    CYST REMOVAL      mid back    FIXATION OF TENDON Right 8/20/2020    Procedure: FIXATION, TENDON, Biceps Tenodesis;  Surgeon: Kayleen Sherman MD;  Location: [facility] OR;  Service: Orthopedics;  Laterality: Right;  FIXATION, TENDON, Biceps Tenodesis    FIXATION OF TENDON Left 3/12/2024    Procedure: FIXATION, TENDON--bicep  tenodesis;  Surgeon: Kayleen Sherman MD;  Location: [facility] [place] OR;  Service: Orthopedics;  Laterality: Left;    ORIF HUMERUS FRACTURE Right 8/20/2020    Procedure: ORIF, FRACTURE, HUMERUS;  Surgeon: Kayleen Sherman MD;  Location: [facility] [place] OR;  Service: Orthopedics;  Laterality: Right;    SHOULDER SURGERY      VASECTOMY       FAMILY HISTORY: No family history on file.  SOCIAL HISTORY:   Social History     Socioeconomic History    Marital status:    Tobacco Use    Smoking status: Never    Smokeless tobacco: Never   Substance and Sexual Activity    Alcohol use: No    Drug use: No    Sexual activity: Yes       MEDICATIONS:   Current Outpatient Medications:     antiox #8/om3/dha/epa/lut/zeax (PRESERVISION AREDS 2, OMEGA-3, ORAL), Take by mouth., Disp: , Rfl:     ascorbic acid-bioflavonoids 200-300 mg Tab, TAKE  BY MOUTH, Disp: , Rfl:     aspirin (ECOTRIN) 81 MG EC tablet, Take 81 mg by mouth once daily.  , Disp: , Rfl:     beta-carotene,A,-vits C,E/mins (OCUVITE ORAL), Take 1 tablet by mouth 2 (two) times a day., Disp: , Rfl:     cyanocobalamin (VITAMIN B-12) 1000 MCG tablet, TAKE ONE TABLET BY MOUTH EVERY DAY FOR VITAMIN DEFICIENCIES, Disp: , Rfl:     ezetimibe-simvastatin 10-10 mg (VYTORIN) 10-10 mg per tablet, Take 1 tablet by mouth every evening., Disp: , Rfl:     fluticasone (FLONASE) 50 mcg/actuation nasal spray, , Disp: , Rfl: 0    gabapentin (NEURONTIN) 300 MG capsule, Take 300 mg by mouth 3 (three) times daily., Disp: , Rfl:     insulin aspart U-100 (NOVOLOG) 100 unit/mL (3 mL) InPn pen, INJECT 5 UNITS SUBCUTANEOUSLY WITH SUPPER FOR DIABETES  WITH FIRST BITE OF MAIN MEAL..   DONOTINJECT ASPART AT BEDTIME. DO NOT INJECT ASPART IF YOU ARE SKIPPING  THE ASSOCIATED MEAL. FOR DIABETES  WITH FIRST BITE OF MAIN MEAL..    DONOTINJECT ASPART AT BEDTIME. DO NOT INJECT ASPART IF YOU ARE SKIPPING   THE ASSOCIATED MEAL., Disp: , Rfl:     insulin glargine (LANTUS) 100 unit/mL injection, Inject 32 Units into the skin every evening.  ,  Disp: , Rfl:     lisinopril (PRINIVIL,ZESTRIL) 20 MG tablet, Take 20 mg by mouth once daily., Disp: , Rfl:     lisinopriL (PRINIVIL,ZESTRIL) 40 MG tablet, TAKE ONE-HALF TABLET BY MOUTH QD FOR HEART/BLOOD PRESSURE, Disp: , Rfl:     lisinopriL 10 MG tablet, Take 10 mg by mouth., Disp: , Rfl:     meclizine (ANTIVERT) 25 mg tablet, , Disp: , Rfl: 3    metFORMIN (GLUMETZA) 1000 MG (MOD) 24hr tablet, Take 1,000 mg by mouth daily with breakfast., Disp: , Rfl:     mupirocin (BACTROBAN) 2 % ointment, Apply topically 2 (two) times daily., Disp: 22 g, Rfl: 0    ondansetron (ZOFRAN) 4 MG tablet, Take 1 tablet (4 mg total) by mouth every 8 (eight) hours as needed for Nausea., Disp: 12 tablet, Rfl: 0    oxyCODONE-acetaminophen (PERCOCET)  mg per tablet, Take 1 tablet by mouth every 4-6 hours as needed for pain. Take stool softener with this medication., Disp: 21 tablet, Rfl: 0    pravastatin (PRAVACHOL) 80 MG tablet, Take 80 mg by mouth every evening., Disp: , Rfl:     promethazine (PHENERGAN) 25 MG tablet, Take 1 tablet (25 mg total) by mouth every 6 (six) hours as needed for Nausea., Disp: 8 tablet, Rfl: 0    sildenafiL (VIAGRA) 100 MG tablet, TAKE ONE-HALF TABLET BY MOUTH EVERY DAY 30 TO 60 MINUTES BEFORE INTERCOURSE FOR BEST RESULTS TAKE ON EMPTY STOMACH, Disp: , Rfl:     simvastatin (ZOCOR) 80 MG tablet, Take 80 mg by mouth every evening., Disp: , Rfl:     docusate sodium (COLACE) 100 MG capsule, Take 1 capsule (100 mg total) by mouth 2 (two) times daily as needed for Constipation. (Patient not taking: Reported on 3/25/2024), Disp: 20 capsule, Rfl: 0    traMADoL (ULTRAM) 50 mg tablet, Take 1-2 tablets ( mg total) by mouth every 6 (six) hours as needed for Pain. (Patient not taking: Reported on 6/21/2024), Disp: 21 tablet, Rfl: 0  ALLERGIES:   Review of patient's allergies indicates:   Allergen Reactions    Meloxicam Other (See Comments)     Drove blood pressure james high per pt       VITAL SIGNS: BP (!) 151/76    "Pulse 63   Ht 5' 10" (1.778 m)   BMI 23.06 kg/m²                                                                                     PHYSICAL EXAMINATION:     Incision sites healed well  No evidence of any erythema, infection or induration  elbow Range of motion full   No effusion  2+ Radial pulses  No swelling            ROM:      Forward Elevation: 115 (supine active assisted forward elevation: 125)  ER: 40  IR: L5    Strength  Scaption at 0 and 30: 5/5  ER: 5/5                                                                         ASSESSMENT:                                                                                                                                               1. Status post above, doing well.                                                                                                                               PLAN:                                                                                                                                                     1. Continue PT; case discussed with therapist   2. Emphasized scapular function.  3. I have discussed return to activity in detail.  4. Patient will see us back in 3 months.                                      5. All questions were answered, surgical technique was reviewed and interpreted, and patient should contact us with any questions or concerns in the interim.                                                                                                   I made the decision to obtain old records of the patient including previous notes and imaging. I independently reviewed and interpreted the radiographs and/or MRIs today as well as prior imaging.          "

## 2024-06-24 ENCOUNTER — CLINICAL SUPPORT (OUTPATIENT)
Dept: REHABILITATION | Facility: HOSPITAL | Age: 74
End: 2024-06-24
Payer: MEDICARE

## 2024-06-24 DIAGNOSIS — M25.512 ACUTE PAIN OF LEFT SHOULDER: Primary | ICD-10-CM

## 2024-06-24 PROCEDURE — 97112 NEUROMUSCULAR REEDUCATION: CPT

## 2024-06-24 PROCEDURE — 97530 THERAPEUTIC ACTIVITIES: CPT

## 2024-06-24 NOTE — PROGRESS NOTES
[facility]   Physical Therapy Treatment Note      Name: Marcos Barfield  Clinic Number: [id number]    Therapy Diagnosis:        Encounter Diagnosis   Name Primary?    Non-traumatic rotator cuff tear, left       Physician: Nhan Grullon III, *    Physician Orders: PT Eval and Treat  Medical Diagnosis from Referral:   M75.102 (ICD-10-CM) - Non-traumatic rotator cuff tear, left      Evaluation Date: 3/22/2024  Authorization Period Expiration: 12/31/2024  Plan of Care Expiration: 9/30/24  Progress Note Due: Performed 4/26/24  Date of Surgery: 3/12/24  Visit # / Visits authorized: 24/30  FOTO: 1/ 3     Precautions:   POSTOPERATIVE PLAN: We will follow the arthroscopic rotator cuff repair guidelines for a small size rotator cuff tear.  We discussed with the patient's family after surgery.  The patient will remain in a sling for 6 weeks.  PT to start at 1-2 weeks.     Cuff specific program:  Pendulum exercises and Codman's exercises in 5-7 days, protecting rotator cuff repair for 1 week by avoiding active motion program until 1 week.     PASSIVE ROM: ER side 30 degrees, Forward Flex 90 degrees, ABD - 60 degrees   Full AAROM/PROM starting at 5-7 days as tolerated       Time In: 10:00 am  Time Out: 11:00 am  Total Billable Time: 30 minutes one on one   Visit Date: 6/24/2024    Subjective     Patient reports: his MD visit was moved to Friday   He was compliant with home exercise program.  Response to previous treatment: Did well after evaluation  Functional change: ongoing    Pain: 2/10  Location: left shoulder      Objective    DOS: 3/12/24  POW: 13 weeks    Observation: wearing ABD sling in proper position     Posture: rounded shoulders      Shoulder Passive Range of Motion:   Shoulder Right Left   Flexion       ER at 90    WNL 65     AROM flexion post session: 130      Joint Mobility: NT       Treatment     Marcos received the treatments listed below:      therapeutic exercises to develop  strength, endurance, and ROM for 00 minutes including:    manual therapy techniques: Joint mobilizations and Soft tissue Mobilization were applied to the: L shoulder for 12 minutes, including:    PROM check ER at 90/Flexion  GH AP, inferior Grades 1-2   Posterior cuff gentle stretch 3x20  Shoulder blade mobs     neuromuscular re-education activities to improve: Balance, Coordination, and Proprioception for 38 minutes. The following activities were included:  Seated ER 90/90 with arm supported 4x6 3#  Serratus wall slide - 3x10   Standing ER/IR GTB - 3x10   Spider man on wall YTB - 2x5  Serratus bear hug - 2x10 RTB  Prone middle trap lev II - 2x10     therapeutic activities to improve functional performance for 10 minutes, including:  UBE 4/4  Landmine press 4# 3x12  Overhead press 2# 3x5          Patient Education and Home Exercises       Education provided:   - POC  - HEP modifications    Written Home Exercises Provided: Patient instructed to cont prior HEP. Exercises were reviewed and Marcos was able to demonstrate them prior to the end of the session.  Marcos demonstrated good  understanding of the education provided. See Electronic Medical Record under Patient Instructions for exercises provided during therapy sessions    Assessment     Patient completed session as noted above. Shoulder active flexion measured 135 at the end of the session. Still limited by post cuff tightness and weakness, but this is improving with each visit. Overall doing well.       Marcos Is progressing well towards his goals.   Patient prognosis is Good.     Patient will continue to benefit from skilled outpatient physical therapy to address the deficits listed in the problem list box on initial evaluation, provide pt/family education and to maximize pt's level of independence in the home and community environment.     Patient's spiritual, cultural and educational needs considered and pt agreeable to plan of care and goals.     Anticipated  barriers to physical therapy: advanced age    Goals:   Post OP shoulder Goals  Short Term Goals (4 Weeks):   1. Pt will be independent with HEP to supplement PT in improving functional use of R UE.  2. Pt will increase pain free left shoulder elevation PROM to >/= 160 deg to improve functional mobility of UE  3. Pt will increase L shoulder ER PROM in 90 deg abduction to >/=20 deg to improve functional mobility of UE  4. Pt will increase L elbow AAROM to WNL deg in 4 weeks to improve functional mobility of UE.     Long Term Goals (24 Weeks):   1. Pt will improve FOTO score to </=30% disability limited to decrease perceived limitation with L shoulder carrying, moving, and handling objects.  2. Pt will increase L shoulder PROM to WNL in all planes to improve functional use of R RUE.  3. Pt will increase L shoulder AROM to WFL in all planes to improve functional use of R RUE.  4. Pt will improve L shoulder MMTs in areas of limitation to promote equal use of B UEs in performing functional tasks.  5. Pt will lift 10 lb objects without pain to promote functional QOL.    Plan     Plan of care Certification: 3/22/2024 to 9/30/24.     Outpatient Physical Therapy 1-2 times weekly for 24 weeks to include the following interventions: Gait Training, Manual Therapy, Neuromuscular Re-ed, Therapeutic Activities, and Therapeutic Exercise.     Wayne Sandoval, PT

## 2024-06-26 ENCOUNTER — CLINICAL SUPPORT (OUTPATIENT)
Dept: REHABILITATION | Facility: HOSPITAL | Age: 74
End: 2024-06-26
Payer: MEDICARE

## 2024-06-26 DIAGNOSIS — M25.512 ACUTE PAIN OF LEFT SHOULDER: Primary | ICD-10-CM

## 2024-06-26 PROCEDURE — 97530 THERAPEUTIC ACTIVITIES: CPT

## 2024-06-26 NOTE — PROGRESS NOTES
[facility]   Physical Therapy Treatment Note      Name: Marcos Barfield  Clinic Number: [id number]    Therapy Diagnosis:        Encounter Diagnosis   Name Primary?    Non-traumatic rotator cuff tear, left       Physician: Nhan Grullon III, *    Physician Orders: PT Eval and Treat  Medical Diagnosis from Referral:   M75.102 (ICD-10-CM) - Non-traumatic rotator cuff tear, left      Evaluation Date: 3/22/2024  Authorization Period Expiration: 12/31/2024  Plan of Care Expiration: 9/30/24  Progress Note Due: Performed 4/26/24  Date of Surgery: 3/12/24  Visit # / Visits authorized: 24/30  FOTO: 1/ 3     Precautions:   POSTOPERATIVE PLAN: We will follow the arthroscopic rotator cuff repair guidelines for a small size rotator cuff tear.  We discussed with the patient's family after surgery.  The patient will remain in a sling for 6 weeks.  PT to start at 1-2 weeks.     Cuff specific program:  Pendulum exercises and Codman's exercises in 5-7 days, protecting rotator cuff repair for 1 week by avoiding active motion program until 1 week.     PASSIVE ROM: ER side 30 degrees, Forward Flex 90 degrees, ABD - 60 degrees   Full AAROM/PROM starting at 5-7 days as tolerated       Time In: 10:00 am  Time Out: 11:00 am  Total Billable Time: 15 minutes one on one   Visit Date: 6/26/2024    Subjective     Patient reports: no complaints today   He was compliant with home exercise program.  Response to previous treatment: Did well after evaluation  Functional change: ongoing    Pain: 2/10  Location: left shoulder      Objective    DOS: 3/12/24  POW: 13 weeks    Observation: wearing ABD sling in proper position     Posture: rounded shoulders      Shoulder Passive Range of Motion:   Shoulder Right Left   Flexion       ER at 90    WNL 65     AROM flexion post session: 130      Joint Mobility: NT       Treatment     Marcos received the treatments listed below:      therapeutic exercises to develop strength,  endurance, and ROM for 00 minutes including:    manual therapy techniques: Joint mobilizations and Soft tissue Mobilization were applied to the: L shoulder for 5 minutes, including:    PROM check ER at 90/Flexion  GH AP, inferior Grades 1-2   Posterior cuff gentle stretch 3x20  Shoulder blade mobs     neuromuscular re-education activities to improve: Balance, Coordination, and Proprioception for 32 minutes. The following activities were included:  Supine ER 90/90 with arm supported 4x6 3#  ER at 90 flexion 3x8 OTB   Serratus wall slide - 3x10   S/L ER 3# 4x5  Prone middle trap lev II - 3x10     therapeutic activities to improve functional performance for 23 minutes, including:  UBE 4/4  Landmine press 7.5# 3x12  Flexion, abduction 2# 2x10          Patient Education and Home Exercises       Education provided:   - POC  - HEP modifications    Written Home Exercises Provided: Patient instructed to cont prior HEP. Exercises were reviewed and Marcos was able to demonstrate them prior to the end of the session.  Marcos demonstrated good  understanding of the education provided. See Electronic Medical Record under Patient Instructions for exercises provided during therapy sessions    Assessment     Patient completed session as noted above. Shoulder active flexion measured 140 at the end of the session. Still limited by post cuff tightness and weakness, but this is improving with each visit. Overall doing well.       Marcos Is progressing well towards his goals.   Patient prognosis is Good.     Patient will continue to benefit from skilled outpatient physical therapy to address the deficits listed in the problem list box on initial evaluation, provide pt/family education and to maximize pt's level of independence in the home and community environment.     Patient's spiritual, cultural and educational needs considered and pt agreeable to plan of care and goals.     Anticipated barriers to physical therapy: advanced  age    Goals:   Post OP shoulder Goals  Short Term Goals (4 Weeks):   1. Pt will be independent with HEP to supplement PT in improving functional use of R UE.  2. Pt will increase pain free left shoulder elevation PROM to >/= 160 deg to improve functional mobility of UE  3. Pt will increase L shoulder ER PROM in 90 deg abduction to >/=20 deg to improve functional mobility of UE  4. Pt will increase L elbow AAROM to WNL deg in 4 weeks to improve functional mobility of UE.     Long Term Goals (24 Weeks):   1. Pt will improve FOTO score to </=30% disability limited to decrease perceived limitation with L shoulder carrying, moving, and handling objects.  2. Pt will increase L shoulder PROM to WNL in all planes to improve functional use of R RUE.  3. Pt will increase L shoulder AROM to WFL in all planes to improve functional use of R RUE.  4. Pt will improve L shoulder MMTs in areas of limitation to promote equal use of B UEs in performing functional tasks.  5. Pt will lift 10 lb objects without pain to promote functional QOL.    Plan     Plan of care Certification: 3/22/2024 to 9/30/24.     Outpatient Physical Therapy 1-2 times weekly for 24 weeks to include the following interventions: Gait Training, Manual Therapy, Neuromuscular Re-ed, Therapeutic Activities, and Therapeutic Exercise.     Wayne Sandoval, PT

## 2024-07-01 ENCOUNTER — CLINICAL SUPPORT (OUTPATIENT)
Dept: REHABILITATION | Facility: HOSPITAL | Age: 74
End: 2024-07-01
Payer: MEDICARE

## 2024-07-01 DIAGNOSIS — M25.511 ACUTE PAIN OF RIGHT SHOULDER: Primary | ICD-10-CM

## 2024-07-01 DIAGNOSIS — M25.512 ACUTE PAIN OF LEFT SHOULDER: ICD-10-CM

## 2024-07-01 DIAGNOSIS — M25.611 DECREASED RANGE OF MOTION OF RIGHT SHOULDER: ICD-10-CM

## 2024-07-01 PROCEDURE — 97530 THERAPEUTIC ACTIVITIES: CPT

## 2024-07-03 ENCOUNTER — CLINICAL SUPPORT (OUTPATIENT)
Dept: REHABILITATION | Facility: HOSPITAL | Age: 74
End: 2024-07-03
Payer: MEDICARE

## 2024-07-03 DIAGNOSIS — M25.512 ACUTE PAIN OF LEFT SHOULDER: Primary | ICD-10-CM

## 2024-07-03 PROCEDURE — 97530 THERAPEUTIC ACTIVITIES: CPT

## 2024-07-03 PROCEDURE — 97112 NEUROMUSCULAR REEDUCATION: CPT

## 2024-07-03 NOTE — PROGRESS NOTES
[facility]   Physical Therapy Treatment Note      Name: Marcos Barfield  Clinic Number: [id number]    Therapy Diagnosis:        Encounter Diagnosis   Name Primary?    Non-traumatic rotator cuff tear, left       Physician: Nhan Grullon III, *    Physician Orders: PT Eval and Treat  Medical Diagnosis from Referral:   M75.102 (ICD-10-CM) - Non-traumatic rotator cuff tear, left      Evaluation Date: 3/22/2024  Authorization Period Expiration: 12/31/2024  Plan of Care Expiration: 9/30/24  Progress Note Due: Performed 4/26/24  Date of Surgery: 3/12/24  Visit # / Visits authorized: 27/30  FOTO: 1/ 3     Precautions:   POSTOPERATIVE PLAN: We will follow the arthroscopic rotator cuff repair guidelines for a small size rotator cuff tear.  We discussed with the patient's family after surgery.  The patient will remain in a sling for 6 weeks.  PT to start at 1-2 weeks.     Cuff specific program:  Pendulum exercises and Codman's exercises in 5-7 days, protecting rotator cuff repair for 1 week by avoiding active motion program until 1 week.     PASSIVE ROM: ER side 30 degrees, Forward Flex 90 degrees, ABD - 60 degrees   Full AAROM/PROM starting at 5-7 days as tolerated       Time In: 10:00 am  Time Out: 11:00 am  Total Billable Time: 15 minutes one on one   Visit Date: 7/3/2024    Subjective     Patient reports: no complaints today   He was compliant with home exercise program.  Response to previous treatment: Did well after evaluation  Functional change: ongoing    Pain: 2/10  Location: left shoulder      Objective    DOS: 3/12/24  POW: 15 weeks    Observation: wearing ABD sling in proper position     Posture: rounded shoulders      Shoulder Passive Range of Motion:   Shoulder Right Left   Flexion       ER at 90    WNL 65     AROM flexion post session: 130      Joint Mobility: NT       Treatment     Marcos received the treatments listed below:      therapeutic exercises to develop strength,  endurance, and ROM for 00 minutes including:    manual therapy techniques: Joint mobilizations and Soft tissue Mobilization were applied to the: L shoulder for 08 minutes, including:    PROM check ER at 90/Flexion  GH AP, inferior Grades 1-2   Posterior cuff gentle stretch 3x20  Shoulder blade mobs     neuromuscular re-education activities to improve: Balance, Coordination, and Proprioception for 32 minutes. The following activities were included:  Supine ER 90/90 GTB 3x12   ER at 90 abd 3x8 GTB   Serratus wall slide foam - 3x5  Prone rows 3x10 5#   Prone LT lev II - 2x10   Prone As 2x10       therapeutic activities to improve functional performance for 20 minutes, including:  UBE 4/4  [place] carry - 4 laps 2#   Overhead press 2# at wall 3x5           Patient Education and Home Exercises       Education provided:   - POC  - HEP modifications    Written Home Exercises Provided: Patient instructed to cont prior HEP. Exercises were reviewed and Marcos was able to demonstrate them prior to the end of the session.  Marcos demonstrated good  understanding of the education provided. See Electronic Medical Record under Patient Instructions for exercises provided during therapy sessions    Assessment     Patient completed session as noted above. Shoulder elevation actively continues to improve but still needs cuing to avoid elbow bending and shoulder shrug. Strengthening is progressing very well at this time.       Marcos Is progressing well towards his goals.   Patient prognosis is Good.     Patient will continue to benefit from skilled outpatient physical therapy to address the deficits listed in the problem list box on initial evaluation, provide pt/family education and to maximize pt's level of independence in the home and community environment.     Patient's spiritual, cultural and educational needs considered and pt agreeable to plan of care and goals.     Anticipated barriers to physical therapy: advanced  age    Goals:   Post OP shoulder Goals  Short Term Goals (4 Weeks):   1. Pt will be independent with HEP to supplement PT in improving functional use of R UE.  2. Pt will increase pain free left shoulder elevation PROM to >/= 160 deg to improve functional mobility of UE  3. Pt will increase L shoulder ER PROM in 90 deg abduction to >/=20 deg to improve functional mobility of UE  4. Pt will increase L elbow AAROM to WNL deg in 4 weeks to improve functional mobility of UE.     Long Term Goals (24 Weeks):   1. Pt will improve FOTO score to </=30% disability limited to decrease perceived limitation with L shoulder carrying, moving, and handling objects.  2. Pt will increase L shoulder PROM to WNL in all planes to improve functional use of R RUE.  3. Pt will increase L shoulder AROM to WFL in all planes to improve functional use of R RUE.  4. Pt will improve L shoulder MMTs in areas of limitation to promote equal use of B UEs in performing functional tasks.  5. Pt will lift 10 lb objects without pain to promote functional QOL.    Plan     Plan of care Certification: 3/22/2024 to 9/30/24.     Outpatient Physical Therapy 1-2 times weekly for 24 weeks to include the following interventions: Gait Training, Manual Therapy, Neuromuscular Re-ed, Therapeutic Activities, and Therapeutic Exercise.     Grant Falcon, PT [facility]   Physical Therapy Treatment Note      Name: Marcos Barfield  [facility] Number: [id number]    Therapy Diagnosis:        Encounter Diagnosis   Name Primary?    Non-traumatic rotator cuff tear, left       Physician: Nhan Grullon III, *    Physician Orders: PT Eval and Treat  Medical Diagnosis from Referral:   M75.102 (ICD-10-CM) - Non-traumatic rotator cuff tear, left      Evaluation Date: 3/22/2024  Authorization Period Expiration: 12/31/2024  Plan of Care Expiration: 9/30/24  Progress Note Due: Performed 4/26/24  Date of Surgery:  3/12/24  Visit # / Visits authorized: 24/30  FOTO: 1/ 3     Precautions:   POSTOPERATIVE PLAN: We will follow the arthroscopic rotator cuff repair guidelines for a small size rotator cuff tear.  We discussed with the patient's family after surgery.  The patient will remain in a sling for 6 weeks.  PT to start at 1-2 weeks.     Cuff specific program:  Pendulum exercises and Codman's exercises in 5-7 days, protecting rotator cuff repair for 1 week by avoiding active motion program until 1 week.     PASSIVE ROM: ER side 30 degrees, Forward Flex 90 degrees, ABD - 60 degrees   Full AAROM/PROM starting at 5-7 days as tolerated       Time In: 10:00 am  Time Out: 11:00 am  Total Billable Time: 15 minutes one on one   Visit Date: 7/3/2024    Subjective     Patient reports: no complaints today   He was compliant with home exercise program.  Response to previous treatment: Did well after evaluation  Functional change: ongoing    Pain: 2/10  Location: left shoulder      Objective    DOS: 3/12/24  POW: 13 weeks    Observation: wearing ABD sling in proper position     Posture: rounded shoulders      Shoulder Passive Range of Motion:   Shoulder Right Left   Flexion       ER at 90    WNL 65     AROM flexion post session: 130      Joint Mobility: NT       Treatment     Marcos received the treatments listed below:      therapeutic exercises to develop strength, endurance, and ROM for 00 minutes including:    manual therapy techniques: Joint mobilizations and Soft tissue Mobilization were applied to the: L shoulder for 5 minutes, including:    PROM check ER at 90/Flexion  GH AP, inferior Grades 1-2   Posterior cuff gentle stretch 3x20  Shoulder blade mobs     neuromuscular re-education activities to improve: Balance, Coordination, and Proprioception for 32 minutes. The following activities were included:  Supine ER 90/90 with arm supported 4x6 3#  ER at 90 flexion 3x8 OTB   Serratus wall slide - 3x10   S/L ER 3# 4x5  Prone  middle trap lev II - 3x10     therapeutic activities to improve functional performance for 23 minutes, including:  UBE 4/4  Landmine press 7.5# 3x12  Flexion, abduction 2# 2x10          Patient Education and Home Exercises       Education provided:   - POC  - HEP modifications    Written Home Exercises Provided: Patient instructed to cont prior HEP. Exercises were reviewed and Marcos was able to demonstrate them prior to the end of the session.  Marcos demonstrated good  understanding of the education provided. See Electronic Medical Record under Patient Instructions for exercises provided during therapy sessions    Assessment     Patient completed session as noted above. Shoulder active flexion measured 140 at the end of the session. Still limited by post cuff tightness and weakness, but this is improving with each visit. Overall doing well.       Marcos Is progressing well towards his goals.   Patient prognosis is Good.     Patient will continue to benefit from skilled outpatient physical therapy to address the deficits listed in the problem list box on initial evaluation, provide pt/family education and to maximize pt's level of independence in the home and community environment.     Patient's spiritual, cultural and educational needs considered and pt agreeable to plan of care and goals.     Anticipated barriers to physical therapy: advanced age    Goals:   Post OP shoulder Goals  Short Term Goals (4 Weeks):   1. Pt will be independent with HEP to supplement PT in improving functional use of R UE.  2. Pt will increase pain free left shoulder elevation PROM to >/= 160 deg to improve functional mobility of UE  3. Pt will increase L shoulder ER PROM in 90 deg abduction to >/=20 deg to improve functional mobility of UE  4. Pt will increase L elbow AAROM to WNL deg in 4 weeks to improve functional mobility of UE.     Long Term Goals (24 Weeks):   1. Pt will improve FOTO score to </=30% disability limited to decrease  perceived limitation with L shoulder carrying, moving, and handling objects.  2. Pt will increase L shoulder PROM to WNL in all planes to improve functional use of R RUE.  3. Pt will increase L shoulder AROM to WFL in all planes to improve functional use of R RUE.  4. Pt will improve L shoulder MMTs in areas of limitation to promote equal use of B UEs in performing functional tasks.  5. Pt will lift 10 lb objects without pain to promote functional QOL.    Plan     Plan of care Certification: 3/22/2024 to 9/30/24.     Outpatient Physical Therapy 1-2 times weekly for 24 weeks to include the following interventions: Gait Training, Manual Therapy, Neuromuscular Re-ed, Therapeutic Activities, and Therapeutic Exercise.     Grant Falcon, PT [facility]   Physical Therapy Treatment Note      Name: Marcos Barfield  [facility] Number: [id number]    Therapy Diagnosis:        Encounter Diagnosis   Name Primary?    Non-traumatic rotator cuff tear, left       Physician: Nhan Grullon III, *    Physician Orders: PT Eval and Treat  Medical Diagnosis from Referral:   M75.102 (ICD-10-CM) - Non-traumatic rotator cuff tear, left      Evaluation Date: 3/22/2024  Authorization Period Expiration: 12/31/2024  Plan of Care Expiration: 9/30/24  Progress Note Due: Performed 4/26/24  Date of Surgery: 3/12/24  Visit # / Visits authorized: 24/30  FOTO: 1/ 3     Precautions:   POSTOPERATIVE PLAN: We will follow the arthroscopic rotator cuff repair guidelines for a small size rotator cuff tear.  We discussed with the patient's family after surgery.  The patient will remain in a sling for 6 weeks.  PT to start at 1-2 weeks.     Cuff specific program:  Pendulum exercises and Codman's exercises in 5-7 days, protecting rotator cuff repair for 1 week by avoiding active motion program until 1 week.     PASSIVE ROM: ER side 30 degrees, Forward Flex 90 degrees, ABD - 60 degrees   Full  AAROM/PROM starting at 5-7 days as tolerated       Time In: 10:00 am  Time Out: 11:00 am  Total Billable Time: 15 minutes one on one   Visit Date: 7/3/2024    Subjective     Patient reports: no complaints today   He was compliant with home exercise program.  Response to previous treatment: Did well after evaluation  Functional change: ongoing    Pain: 2/10  Location: left shoulder      Objective    DOS: 3/12/24  POW: 15 weeks    Observation: wearing ABD sling in proper position     Posture: rounded shoulders      Shoulder Passive Range of Motion:   Shoulder Right Left   Flexion       ER at 90    WNL 65     AROM flexion post session: 130      Joint Mobility: NT       Treatment     Marcos received the treatments listed below:      therapeutic exercises to develop strength, endurance, and ROM for 00 minutes including:    manual therapy techniques: Joint mobilizations and Soft tissue Mobilization were applied to the: L shoulder for 5 minutes, including:    PROM check ER at 90/Flexion  GH AP, inferior Grades 1-2   Posterior cuff gentle stretch 3x20  Shoulder blade mobs     neuromuscular re-education activities to improve: Balance, Coordination, and Proprioception for 32 minutes. The following activities were included:  ER at 90 abd 3x8 GTB   Serratus wall slide +YTB - 3x10   S/L ER 3# 3x12  Bear hugs GTB - 3x10   Prone rows 3x10 5#       therapeutic activities to improve functional performance for 23 minutes, including:  UBE 4/4  Landmine press 7.5# 3x12  Flexion, abduction 2# 2x10  Shoulder raise  degrees 3x5   Overhead press 3x5 2#        Patient Education and Home Exercises       Education provided:   - POC  - HEP modifications    Written Home Exercises Provided: Patient instructed to cont prior HEP. Exercises were reviewed and Marcos was able to demonstrate them prior to the end of the session.  Marcos demonstrated good  understanding of the education provided. See Electronic Medical Record under Patient  Instructions for exercises provided during therapy sessions    Assessment     Patient completed session as noted above. Shoulder elevation actively continues to improve but still needs cuing to avoid elbow bending and shoulder shrug. Strengthening is progressing very well at this time.       Marcos Is progressing well towards his goals.   Patient prognosis is Good.     Patient will continue to benefit from skilled outpatient physical therapy to address the deficits listed in the problem list box on initial evaluation, provide pt/family education and to maximize pt's level of independence in the home and community environment.     Patient's spiritual, cultural and educational needs considered and pt agreeable to plan of care and goals.     Anticipated barriers to physical therapy: advanced age    Goals:   Post OP shoulder Goals  Short Term Goals (4 Weeks):   1. Pt will be independent with HEP to supplement PT in improving functional use of R UE.  2. Pt will increase pain free left shoulder elevation PROM to >/= 160 deg to improve functional mobility of UE  3. Pt will increase L shoulder ER PROM in 90 deg abduction to >/=20 deg to improve functional mobility of UE  4. Pt will increase L elbow AAROM to WNL deg in 4 weeks to improve functional mobility of UE.     Long Term Goals (24 Weeks):   1. Pt will improve FOTO score to </=30% disability limited to decrease perceived limitation with L shoulder carrying, moving, and handling objects.  2. Pt will increase L shoulder PROM to WNL in all planes to improve functional use of R RUE.  3. Pt will increase L shoulder AROM to WFL in all planes to improve functional use of R RUE.  4. Pt will improve L shoulder MMTs in areas of limitation to promote equal use of B UEs in performing functional tasks.  5. Pt will lift 10 lb objects without pain to promote functional QOL.    Plan     Plan of care Certification: 3/22/2024 to 9/30/24.     Outpatient Physical Therapy 1-2 times weekly  for 24 weeks to include the following interventions: Gait Training, Manual Therapy, Neuromuscular Re-ed, Therapeutic Activities, and Therapeutic Exercise.     Wayne Sandoval, PT                                             [facility]   Physical Therapy Treatment Note      Name: Marcos Barfield  Clinic Number: [id number]    Therapy Diagnosis:        Encounter Diagnosis   Name Primary?    Non-traumatic rotator cuff tear, left       Physician: Nhan Grullon III, *    Physician Orders: PT Eval and Treat  Medical Diagnosis from Referral:   M75.102 (ICD-10-CM) - Non-traumatic rotator cuff tear, left      Evaluation Date: 3/22/2024  Authorization Period Expiration: 12/31/2024  Plan of Care Expiration: 9/30/24  Progress Note Due: Performed 4/26/24  Date of Surgery: 3/12/24  Visit # / Visits authorized: 24/30  FOTO: 1/ 3     Precautions:   POSTOPERATIVE PLAN: We will follow the arthroscopic rotator cuff repair guidelines for a small size rotator cuff tear.  We discussed with the patient's family after surgery.  The patient will remain in a sling for 6 weeks.  PT to start at 1-2 weeks.     Cuff specific program:  Pendulum exercises and Codman's exercises in 5-7 days, protecting rotator cuff repair for 1 week by avoiding active motion program until 1 week.     PASSIVE ROM: ER side 30 degrees, Forward Flex 90 degrees, ABD - 60 degrees   Full AAROM/PROM starting at 5-7 days as tolerated       Time In: 10:00 am  Time Out: 11:00 am  Total Billable Time: 15 minutes one on one   Visit Date: 7/3/2024    Subjective     Patient reports: no complaints today   He was compliant with home exercise program.  Response to previous treatment: Did well after evaluation  Functional change: ongoing    Pain: 2/10  Location: left shoulder      Objective    DOS: 3/12/24  POW: 13 weeks    Observation: wearing ABD sling in proper position     Posture: rounded shoulders      Shoulder Passive Range of Motion:   Shoulder Right  Left   Flexion       ER at 90    WNL 65     AROM flexion post session: 130      Joint Mobility: NT       Treatment     Marcos received the treatments listed below:      therapeutic exercises to develop strength, endurance, and ROM for 00 minutes including:    manual therapy techniques: Joint mobilizations and Soft tissue Mobilization were applied to the: L shoulder for 5 minutes, including:    PROM check ER at 90/Flexion  GH AP, inferior Grades 1-2   Posterior cuff gentle stretch 3x20  Shoulder blade mobs     neuromuscular re-education activities to improve: Balance, Coordination, and Proprioception for 32 minutes. The following activities were included:  Supine ER 90/90 with arm supported 4x6 3#  ER at 90 flexion 3x8 OTB   Serratus wall slide - 3x10   S/L ER 3# 4x5  Prone middle trap lev II - 3x10     therapeutic activities to improve functional performance for 23 minutes, including:  UBE 4/4  Landmine press 7.5# 3x12  Flexion, abduction 2# 2x10          Patient Education and Home Exercises       Education provided:   - POC  - HEP modifications    Written Home Exercises Provided: Patient instructed to cont prior HEP. Exercises were reviewed and Marcos was able to demonstrate them prior to the end of the session.  Marcos demonstrated good  understanding of the education provided. See Electronic Medical Record under Patient Instructions for exercises provided during therapy sessions    Assessment     Patient completed session as noted above. Shoulder active flexion measured 140 at the end of the session. Still limited by post cuff tightness and weakness, but this is improving with each visit. Overall doing well.       Marcos Is progressing well towards his goals.   Patient prognosis is Good.     Patient will continue to benefit from skilled outpatient physical therapy to address the deficits listed in the problem list box on initial evaluation, provide pt/family education and to maximize pt's level of independence  in the home and community environment.     Patient's spiritual, cultural and educational needs considered and pt agreeable to plan of care and goals.     Anticipated barriers to physical therapy: advanced age    Goals:   Post OP shoulder Goals  Short Term Goals (4 Weeks):   1. Pt will be independent with HEP to supplement PT in improving functional use of R UE.  2. Pt will increase pain free left shoulder elevation PROM to >/= 160 deg to improve functional mobility of UE  3. Pt will increase L shoulder ER PROM in 90 deg abduction to >/=20 deg to improve functional mobility of UE  4. Pt will increase L elbow AAROM to WNL deg in 4 weeks to improve functional mobility of UE.     Long Term Goals (24 Weeks):   1. Pt will improve FOTO score to </=30% disability limited to decrease perceived limitation with L shoulder carrying, moving, and handling objects.  2. Pt will increase L shoulder PROM to WNL in all planes to improve functional use of R RUE.  3. Pt will increase L shoulder AROM to WFL in all planes to improve functional use of R RUE.  4. Pt will improve L shoulder MMTs in areas of limitation to promote equal use of B UEs in performing functional tasks.  5. Pt will lift 10 lb objects without pain to promote functional QOL.    Plan     Plan of care Certification: 3/22/2024 to 9/30/24.     Outpatient Physical Therapy 1-2 times weekly for 24 weeks to include the following interventions: Gait Training, Manual Therapy, Neuromuscular Re-ed, Therapeutic Activities, and Therapeutic Exercise.     Wayne Sandoval, PT

## 2024-07-15 ENCOUNTER — CLINICAL SUPPORT (OUTPATIENT)
Dept: REHABILITATION | Facility: HOSPITAL | Age: 74
End: 2024-07-15
Payer: MEDICARE

## 2024-07-15 DIAGNOSIS — M25.512 ACUTE PAIN OF LEFT SHOULDER: Primary | ICD-10-CM

## 2024-07-15 PROCEDURE — 97530 THERAPEUTIC ACTIVITIES: CPT

## 2024-07-15 NOTE — PROGRESS NOTES
[facility]   Physical Therapy Treatment Note / reassessment      Name: Marcos Barfield  Clinic Number: [id number]    Therapy Diagnosis:        Encounter Diagnosis   Name Primary?    Non-traumatic rotator cuff tear, left       Physician: Nhan Grullon III, *    Physician Orders: PT Eval and Treat  Medical Diagnosis from Referral:   M75.102 (ICD-10-CM) - Non-traumatic rotator cuff tear, left      Evaluation Date: 3/22/2024  Authorization Period Expiration: 12/31/2024  Plan of Care Expiration: 9/30/24  Progress Note Due: Performed 7/15/24  Date of Surgery: 3/12/24  Visit # / Visits authorized: 28/30  FOTO: 1/ 3     Precautions:   POSTOPERATIVE PLAN: We will follow the arthroscopic rotator cuff repair guidelines for a small size rotator cuff tear.  We discussed with the patient's family after surgery.  The patient will remain in a sling for 6 weeks.  PT to start at 1-2 weeks.     Cuff specific program:  Pendulum exercises and Codman's exercises in 5-7 days, protecting rotator cuff repair for 1 week by avoiding active motion program until 1 week.     PASSIVE ROM: ER side 30 degrees, Forward Flex 90 degrees, ABD - 60 degrees   Full AAROM/PROM starting at 5-7 days as tolerated       Time In: 10:00 am  Time Out: 11:00 am  Total Billable Time: 30 minutes one on one   Visit Date: 7/15/2024    Subjective     Patient reports: went on vacation and shoulder did well.   He was compliant with home exercise program.  Response to previous treatment: Did well after evaluation  Functional change: ongoing    Pain: 2/10  Location: left shoulder      Objective    DOS: 3/12/24  POW: 15 weeks    Observation: no sling     Posture: rounded shoulders      Shoulder Passive Range of Motion:   Shoulder Right Left   Flexion       ER at 90    WNL 65     AROM flexion post session: 140     Joint Mobility: NT       Treatment     Marcos received the treatments listed below:      therapeutic exercises to develop  strength, endurance, and ROM for 00 minutes including:    manual therapy techniques: Joint mobilizations and Soft tissue Mobilization were applied to the: L shoulder for 08 minutes, including:    PROM check ER at 90/Flexion  GH AP, inferior Grades 1-2   Posterior cuff gentle stretch 3x20  Shoulder blade mobs     neuromuscular re-education activities to improve: Balance, Coordination, and Proprioception for 10 minutes. The following activities were included:  Dynamic lat stretch x20   Supine dowel flexion in ER with cuing- 3x10       therapeutic activities to improve functional performance for 42 minutes, including:  UBE 4/4  Statue [place] liberty carry - 4 laps 2#   Overhead press 3# at wall 3x8  Flex/abd full ROM 2# 2x10             Patient Education and Home Exercises       Education provided:   - POC  - HEP modifications    Written Home Exercises Provided: Patient instructed to cont prior HEP. Exercises were reviewed and Marcos was able to demonstrate them prior to the end of the session.  Marcos demonstrated good  understanding of the education provided. See Electronic Medical Record under Patient Instructions for exercises provided during therapy sessions    Assessment     Patient completed session as noted above. Shoulder flexion still limited past 140 degrees but improves at the end of session. Overall strength is progressing very well.       Marcos Is progressing well towards his goals.   Patient prognosis is Good.     Patient will continue to benefit from skilled outpatient physical therapy to address the deficits listed in the problem list box on initial evaluation, provide pt/family education and to maximize pt's level of independence in the home and community environment.     Patient's spiritual, cultural and educational needs considered and pt agreeable to plan of care and goals.     Anticipated barriers to physical therapy: advanced age    Goals:   Post OP shoulder Goals  Short Term Goals (4 Weeks):   1. Pt  will be independent with HEP to supplement PT in improving functional use of R UE.  2. Pt will increase pain free left shoulder elevation PROM to >/= 160 deg to improve functional mobility of UE  3. Pt will increase L shoulder ER PROM in 90 deg abduction to >/=20 deg to improve functional mobility of UE  4. Pt will increase L elbow AAROM to WNL deg in 4 weeks to improve functional mobility of UE.     Long Term Goals (24 Weeks):   1. Pt will improve FOTO score to </=30% disability limited to decrease perceived limitation with L shoulder carrying, moving, and handling objects.  2. Pt will increase L shoulder PROM to WNL in all planes to improve functional use of R RUE.  3. Pt will increase L shoulder AROM to WFL in all planes to improve functional use of R RUE.  4. Pt will improve L shoulder MMTs in areas of limitation to promote equal use of B UEs in performing functional tasks.  5. Pt will lift 10 lb objects without pain to promote functional QOL.    Plan     Plan of care Certification: 3/22/2024 to 9/30/24.     Outpatient Physical Therapy 1-2 times weekly for 24 weeks to include the following interventions: Gait Training, Manual Therapy, Neuromuscular Re-ed, Therapeutic Activities, and Therapeutic Exercise.     Wayne Sandoval, PT                                             [facility]   Physical Therapy Treatment Note      Name: Marcos Barfield  [facility] Number: [id number]    Therapy Diagnosis:        Encounter Diagnosis   Name Primary?    Non-traumatic rotator cuff tear, left       Physician: Nhan Grullon III, *    Physician Orders: PT Eval and Treat  Medical Diagnosis from Referral:   M75.102 (ICD-10-CM) - Non-traumatic rotator cuff tear, left      Evaluation Date: 3/22/2024  Authorization Period Expiration: 12/31/2024  Plan of Care Expiration: 9/30/24  Progress Note Due: Performed 4/26/24  Date of Surgery: 3/12/24  Visit # / Visits authorized: 24/30  FOTO: 1/ 3     Precautions:    POSTOPERATIVE PLAN: We will follow the arthroscopic rotator cuff repair guidelines for a small size rotator cuff tear.  We discussed with the patient's family after surgery.  The patient will remain in a sling for 6 weeks.  PT to start at 1-2 weeks.     Cuff specific program:  Pendulum exercises and Codman's exercises in 5-7 days, protecting rotator cuff repair for 1 week by avoiding active motion program until 1 week.     PASSIVE ROM: ER side 30 degrees, Forward Flex 90 degrees, ABD - 60 degrees   Full AAROM/PROM starting at 5-7 days as tolerated       Time In: 10:00 am  Time Out: 11:00 am  Total Billable Time: 15 minutes one on one   Visit Date: 7/15/2024    Subjective     Patient reports: no complaints today   He was compliant with home exercise program.  Response to previous treatment: Did well after evaluation  Functional change: ongoing    Pain: 2/10  Location: left shoulder      Objective    DOS: 3/12/24  POW: 13 weeks    Observation: wearing ABD sling in proper position     Posture: rounded shoulders      Shoulder Passive Range of Motion:   Shoulder Right Left   Flexion       ER at 90    WNL 65     AROM flexion post session: 130      Joint Mobility: NT       Treatment     Marcos received the treatments listed below:      therapeutic exercises to develop strength, endurance, and ROM for 00 minutes including:    manual therapy techniques: Joint mobilizations and Soft tissue Mobilization were applied to the: L shoulder for 5 minutes, including:    PROM check ER at 90/Flexion  GH AP, inferior Grades 1-2   Posterior cuff gentle stretch 3x20  Shoulder blade mobs     neuromuscular re-education activities to improve: Balance, Coordination, and Proprioception for 32 minutes. The following activities were included:  Supine ER 90/90 with arm supported 4x6 3#  ER at 90 flexion 3x8 OTB   Serratus wall slide - 3x10   S/L ER 3# 4x5  Prone middle trap lev II - 3x10     therapeutic activities to improve functional  performance for 23 minutes, including:  UBE 4/4  Landmine press 7.5# 3x12  Flexion, abduction 2# 2x10          Patient Education and Home Exercises       Education provided:   - POC  - HEP modifications    Written Home Exercises Provided: Patient instructed to cont prior HEP. Exercises were reviewed and Marcos was able to demonstrate them prior to the end of the session.  Marcos demonstrated good  understanding of the education provided. See Electronic Medical Record under Patient Instructions for exercises provided during therapy sessions    Assessment     Patient completed session as noted above. Shoulder active flexion measured 140 at the end of the session. Still limited by post cuff tightness and weakness, but this is improving with each visit. Overall doing well.       Marcos Is progressing well towards his goals.   Patient prognosis is Good.     Patient will continue to benefit from skilled outpatient physical therapy to address the deficits listed in the problem list box on initial evaluation, provide pt/family education and to maximize pt's level of independence in the home and community environment.     Patient's spiritual, cultural and educational needs considered and pt agreeable to plan of care and goals.     Anticipated barriers to physical therapy: advanced age    Goals:   Post OP shoulder Goals  Short Term Goals (4 Weeks):   1. Pt will be independent with HEP to supplement PT in improving functional use of R UE.  2. Pt will increase pain free left shoulder elevation PROM to >/= 160 deg to improve functional mobility of UE  3. Pt will increase L shoulder ER PROM in 90 deg abduction to >/=20 deg to improve functional mobility of UE  4. Pt will increase L elbow AAROM to WNL deg in 4 weeks to improve functional mobility of UE.     Long Term Goals (24 Weeks):   1. Pt will improve FOTO score to </=30% disability limited to decrease perceived limitation with L shoulder carrying, moving, and handling  objects.  2. Pt will increase L shoulder PROM to WNL in all planes to improve functional use of R RUE.  3. Pt will increase L shoulder AROM to WFL in all planes to improve functional use of R RUE.  4. Pt will improve L shoulder MMTs in areas of limitation to promote equal use of B UEs in performing functional tasks.  5. Pt will lift 10 lb objects without pain to promote functional QOL.    Plan     Plan of care Certification: 3/22/2024 to 9/30/24.     Outpatient Physical Therapy 1-2 times weekly for 24 weeks to include the following interventions: Gait Training, Manual Therapy, Neuromuscular Re-ed, Therapeutic Activities, and Therapeutic Exercise.     Wayne Sandoval, PT

## 2024-07-17 ENCOUNTER — CLINICAL SUPPORT (OUTPATIENT)
Dept: REHABILITATION | Facility: HOSPITAL | Age: 74
End: 2024-07-17
Payer: MEDICARE

## 2024-07-17 DIAGNOSIS — M25.512 ACUTE PAIN OF LEFT SHOULDER: Primary | ICD-10-CM

## 2024-07-17 PROCEDURE — 97530 THERAPEUTIC ACTIVITIES: CPT

## 2024-07-17 NOTE — PROGRESS NOTES
[facility]   Physical Therapy Treatment Note / reassessment      Name: Marcos Barfield  Clinic Number: [id number]    Therapy Diagnosis:        Encounter Diagnosis   Name Primary?    Non-traumatic rotator cuff tear, left       Physician: Nhan Grullon III, *    Physician Orders: PT Eval and Treat  Medical Diagnosis from Referral:   M75.102 (ICD-10-CM) - Non-traumatic rotator cuff tear, left      Evaluation Date: 3/22/2024  Authorization Period Expiration: 12/31/2024  Plan of Care Expiration: 9/30/24  Progress Note Due: Performed 7/15/24  Date of Surgery: 3/12/24  Visit # / Visits authorized: 28/30  FOTO: 1/ 3     Precautions:   POSTOPERATIVE PLAN: We will follow the arthroscopic rotator cuff repair guidelines for a small size rotator cuff tear.  We discussed with the patient's family after surgery.  The patient will remain in a sling for 6 weeks.  PT to start at 1-2 weeks.     Cuff specific program:  Pendulum exercises and Codman's exercises in 5-7 days, protecting rotator cuff repair for 1 week by avoiding active motion program until 1 week.     PASSIVE ROM: ER side 30 degrees, Forward Flex 90 degrees, ABD - 60 degrees   Full AAROM/PROM starting at 5-7 days as tolerated       Time In: 10:00 am  Time Out: 11:00 am  Total Billable Time: 30 minutes one on one   Visit Date: 7/17/2024    Subjective     Patient reports: went on vacation and shoulder did well.   He was compliant with home exercise program.  Response to previous treatment: Did well after evaluation  Functional change: ongoing    Pain: 2/10  Location: left shoulder      Objective    DOS: 3/12/24  POW: 15 weeks    Observation: no sling     Posture: rounded shoulders      Shoulder Passive Range of Motion:   Shoulder Right Left   Flexion       ER at 90    WNL 65     AROM flexion post session: 140     Joint Mobility: NT       Treatment     Marcos received the treatments listed below:      therapeutic exercises to develop  strength, endurance, and ROM for 00 minutes including:    manual therapy techniques: Joint mobilizations and Soft tissue Mobilization were applied to the: L shoulder for 08 minutes, including:    PROM check ER at 90/Flexion  GH AP, inferior Grades 1-2   Posterior cuff gentle stretch 3x20  Shoulder blade mobs     neuromuscular re-education activities to improve: Balance, Coordination, and Proprioception for 10 minutes. The following activities were included:  Dynamic lat stretch x20   Supine dowel flexion in ER with cuing- 3x10       therapeutic activities to improve functional performance for 42 minutes, including:  UBE 4/4  Statue of liberty carry - 4 laps 2#   Overhead press 3# at wall 3x8  Flex/abd full ROM 2# 2x10             Patient Education and Home Exercises       Education provided:   - POC  - HEP modifications    Written Home Exercises Provided: Patient instructed to cont prior HEP. Exercises were reviewed and Marcos was able to demonstrate them prior to the end of the session.  Marcos demonstrated good  understanding of the education provided. See Electronic Medical Record under Patient Instructions for exercises provided during therapy sessions    Assessment     Patient completed session as noted above. Shoulder flexion still limited past 140 degrees but improves at the end of session. Overall strength is progressing very well.       Marcos Is progressing well towards his goals.   Patient prognosis is Good.     Patient will continue to benefit from skilled outpatient physical therapy to address the deficits listed in the problem list box on initial evaluation, provide pt/family education and to maximize pt's level of independence in the home and community environment.     Patient's spiritual, cultural and educational needs considered and pt agreeable to plan of care and goals.     Anticipated barriers to physical therapy: advanced age    Goals:   Post OP shoulder Goals  Short Term Goals (4 Weeks):   1. Pt  will be independent with HEP to supplement PT in improving functional use of R UE.  2. Pt will increase pain free left shoulder elevation PROM to >/= 160 deg to improve functional mobility of UE  3. Pt will increase L shoulder ER PROM in 90 deg abduction to >/=20 deg to improve functional mobility of UE  4. Pt will increase L elbow AAROM to WNL deg in 4 weeks to improve functional mobility of UE.     Long Term Goals (24 Weeks):   1. Pt will improve FOTO score to </=30% disability limited to decrease perceived limitation with L shoulder carrying, moving, and handling objects.  2. Pt will increase L shoulder PROM to WNL in all planes to improve functional use of R RUE.  3. Pt will increase L shoulder AROM to WFL in all planes to improve functional use of R RUE.  4. Pt will improve L shoulder MMTs in areas of limitation to promote equal use of B UEs in performing functional tasks.  5. Pt will lift 10 lb objects without pain to promote functional QOL.    Plan     Plan of care Certification: 3/22/2024 to 9/30/24.     Outpatient Physical Therapy 1-2 times weekly for 24 weeks to include the following interventions: Gait Training, Manual Therapy, Neuromuscular Re-ed, Therapeutic Activities, and Therapeutic Exercise.     Wayne Sandoval, PT                                             [facility]   Physical Therapy Treatment Note      Name: Marcos Barfield  [facility] Number: [id number]    Therapy Diagnosis:        Encounter Diagnosis   Name Primary?    Non-traumatic rotator cuff tear, left       Physician: Nhan Grullon III, *    Physician Orders: PT Eval and Treat  Medical Diagnosis from Referral:   M75.102 (ICD-10-CM) - Non-traumatic rotator cuff tear, left      Evaluation Date: 3/22/2024  Authorization Period Expiration: 12/31/2024  Plan of Care Expiration: 9/30/24  Progress Note Due: Performed 4/26/24  Date of Surgery: 3/12/24  Visit # / Visits authorized: 24/30  FOTO: 1/ 3     Precautions:    POSTOPERATIVE PLAN: We will follow the arthroscopic rotator cuff repair guidelines for a small size rotator cuff tear.  We discussed with the patient's family after surgery.  The patient will remain in a sling for 6 weeks.  PT to start at 1-2 weeks.     Cuff specific program:  Pendulum exercises and Codman's exercises in 5-7 days, protecting rotator cuff repair for 1 week by avoiding active motion program until 1 week.     PASSIVE ROM: ER side 30 degrees, Forward Flex 90 degrees, ABD - 60 degrees   Full AAROM/PROM starting at 5-7 days as tolerated       Time In: 10:00 am  Time Out: 10:55 am  Total Billable Time: 30 minutes one on one   Visit Date: 7/17/2024    Subjective     Patient reports: no complaints today   He was compliant with home exercise program.  Response to previous treatment: Did well after evaluation  Functional change: ongoing    Pain: 2/10  Location: left shoulder      Objective    DOS: 3/12/24  POW: 13 weeks    Observation: wearing ABD sling in proper position     Posture: rounded shoulders      Shoulder Passive Range of Motion:   Shoulder Right Left   Flexion       ER at 90    WNL 65     AROM flexion post session: 130      Joint Mobility: NT       Treatment     Marcos received the treatments listed below:      therapeutic exercises to develop strength, endurance, and ROM for 00 minutes including:    manual therapy techniques: Joint mobilizations and Soft tissue Mobilization were applied to the: L shoulder for 0 minutes, including:    PROM check ER at 90/Flexion  GH AP, inferior Grades 1-2   Posterior cuff gentle stretch 3x20  Shoulder blade mobs     neuromuscular re-education activities to improve: Balance, Coordination, and Proprioception for 25 minutes. The following activities were included:  S/L ER 3# 3x12  Prone ER 0# 4x6  Prone abd 0# 4x6      therapeutic activities to improve functional performance for 30 minutes, including:  UBE 4/4  Overhead press #3 3x10   OH carries 3# 3 laps    Wall pushups 3x10     Patient Education and Home Exercises       Education provided:   - POC  - HEP modifications    Written Home Exercises Provided: Patient instructed to cont prior HEP. Exercises were reviewed and Marcos was able to demonstrate them prior to the end of the session.  Marcos demonstrated good  understanding of the education provided. See Electronic Medical Record under Patient Instructions for exercises provided during therapy sessions    Assessment     Patient completed session as noted above. Shoulder active flexion measured 140 at the end of the session. Still limited by post cuff tightness and weakness, but this is improving with each visit. Overall doing well.       Marcos Is progressing well towards his goals.   Patient prognosis is Good.     Patient will continue to benefit from skilled outpatient physical therapy to address the deficits listed in the problem list box on initial evaluation, provide pt/family education and to maximize pt's level of independence in the home and community environment.     Patient's spiritual, cultural and educational needs considered and pt agreeable to plan of care and goals.     Anticipated barriers to physical therapy: advanced age    Goals:   Post OP shoulder Goals  Short Term Goals (4 Weeks):   1. Pt will be independent with HEP to supplement PT in improving functional use of R UE.  2. Pt will increase pain free left shoulder elevation PROM to >/= 160 deg to improve functional mobility of UE  3. Pt will increase L shoulder ER PROM in 90 deg abduction to >/=20 deg to improve functional mobility of UE  4. Pt will increase L elbow AAROM to WNL deg in 4 weeks to improve functional mobility of UE.     Long Term Goals (24 Weeks):   1. Pt will improve FOTO score to </=30% disability limited to decrease perceived limitation with L shoulder carrying, moving, and handling objects.  2. Pt will increase L shoulder PROM to WNL in all planes to improve functional use of R  RUE.  3. Pt will increase L shoulder AROM to WFL in all planes to improve functional use of R RUE.  4. Pt will improve L shoulder MMTs in areas of limitation to promote equal use of B UEs in performing functional tasks.  5. Pt will lift 10 lb objects without pain to promote functional QOL.    Plan     Plan of care Certification: 3/22/2024 to 9/30/24.     Outpatient Physical Therapy 1-2 times weekly for 24 weeks to include the following interventions: Gait Training, Manual Therapy, Neuromuscular Re-ed, Therapeutic Activities, and Therapeutic Exercise.     Wayne Sandoval, PT

## 2024-07-22 ENCOUNTER — CLINICAL SUPPORT (OUTPATIENT)
Dept: REHABILITATION | Facility: HOSPITAL | Age: 74
End: 2024-07-22
Payer: MEDICARE

## 2024-07-22 DIAGNOSIS — M25.512 ACUTE PAIN OF LEFT SHOULDER: Primary | ICD-10-CM

## 2024-07-22 PROCEDURE — 97112 NEUROMUSCULAR REEDUCATION: CPT

## 2024-07-22 PROCEDURE — 97140 MANUAL THERAPY 1/> REGIONS: CPT

## 2024-07-22 PROCEDURE — 97110 THERAPEUTIC EXERCISES: CPT

## 2024-07-22 PROCEDURE — 97530 THERAPEUTIC ACTIVITIES: CPT

## 2024-07-22 NOTE — PROGRESS NOTES
[facility]   Physical Therapy Treatment Note      Name: Marcos Barfield  Clinic Number: [id number]    Therapy Diagnosis:        Encounter Diagnosis   Name Primary?    Non-traumatic rotator cuff tear, left       Physician: Nhan Grullon III, *    Physician Orders: PT Eval and Treat  Medical Diagnosis from Referral:   M75.102 (ICD-10-CM) - Non-traumatic rotator cuff tear, left      Evaluation Date: 3/22/2024  Authorization Period Expiration: 12/31/2024  Plan of Care Expiration: 9/30/24  Progress Note Due: Performed 7/17/24  Date of Surgery: 3/12/24  Visit # / Visits authorized: 24/30  FOTO: 1/ 3     Precautions:   POSTOPERATIVE PLAN: We will follow the arthroscopic rotator cuff repair guidelines for a small size rotator cuff tear.  We discussed with the patient's family after surgery.  The patient will remain in a sling for 6 weeks.  PT to start at 1-2 weeks.     Cuff specific program:  Pendulum exercises and Codman's exercises in 5-7 days, protecting rotator cuff repair for 1 week by avoiding active motion program until 1 week.     PASSIVE ROM: ER side 30 degrees, Forward Flex 90 degrees, ABD - 60 degrees   Full AAROM/PROM starting at 5-7 days as tolerated       Time In: 10:00 am  Time Out: 10:55 am  Total Billable Time: 30 minutes one on one   Visit Date: 7/22/2024    Subjective     Patient reports: no complaints today   He was compliant with home exercise program.  Response to previous treatment: Did well after evaluation  Functional change: ongoing    Pain: 2/10  Location: left shoulder      Objective    DOS: 3/12/24  POW: 13 weeks    Observation: wearing ABD sling in proper position     Posture: rounded shoulders      Shoulder Passive Range of Motion:   Shoulder Right Left   Flexion       ER at 90    WNL 65     AROM flexion post session: 130      Joint Mobility: NT       Treatment     Marcos received the treatments listed below:      therapeutic exercises to develop strength,  endurance, and ROM for 13 minutes including:  Prayer stretch with dowel 10x10s   Flexion  with therapist assistance - 3x5    manual therapy techniques: Joint mobilizations and Soft tissue Mobilization were applied to the: L shoulder for 15 minutes, including:    PROM check ER at 90/Flexion  GH AP, inferior Grades 1-2   Posterior cuff gentle stretch 3x20  Shoulder blade mobs     neuromuscular re-education activities to improve: Balance, Coordination, and Proprioception for 15 minutes. The following activities were included:  Standing ER at 90 degrees abd 2# 3x10  Prone abd 0# 3x10  Prone Y - 3x5   Serratus wall slides FR 3x8      therapeutic activities to improve functional performance for 15 minutes, including:  UBE 4/4  Mat pushups 3x5  Lat pulldown 13# 3x15    Patient Education and Home Exercises       Education provided:   - POC  - HEP modifications    Written Home Exercises Provided: Patient instructed to cont prior HEP. Exercises were reviewed and Marcos was able to demonstrate them prior to the end of the session.  Marcos demonstrated good  understanding of the education provided. See Electronic Medical Record under Patient Instructions for exercises provided during therapy sessions    Assessment     Patient completed session as noted above. OH lifting still limited per the last 10 degrees due to weakness, but overall improved. Able to perform pushups on the mat today with no issues. Overall doing well.       Marcos Is progressing well towards his goals.   Patient prognosis is Good.     Patient will continue to benefit from skilled outpatient physical therapy to address the deficits listed in the problem list box on initial evaluation, provide pt/family education and to maximize pt's level of independence in the home and community environment.     Patient's spiritual, cultural and educational needs considered and pt agreeable to plan of care and goals.     Anticipated barriers to physical therapy: advanced  age    Goals:   Post OP shoulder Goals  Short Term Goals (4 Weeks):   1. Pt will be independent with HEP to supplement PT in improving functional use of R UE. MET  2. Pt will increase pain free left shoulder elevation PROM to >/= 160 deg to improve functional mobility of UE MET  3. Pt will increase L shoulder ER PROM in 90 deg abduction to >/=20 deg to improve functional mobility of UE MET  4. Pt will increase L elbow AAROM to WNL deg in 4 weeks to improve functional mobility of UE. MET     Long Term Goals (24 Weeks):  progressing all   1. Pt will improve FOTO score to </=30% disability limited to decrease perceived limitation with L shoulder carrying, moving, and handling objects.  2. Pt will increase L shoulder PROM to WNL in all planes to improve functional use of R RUE.  3. Pt will increase L shoulder AROM to WFL in all planes to improve functional use of R RUE.  4. Pt will improve L shoulder MMTs in areas of limitation to promote equal use of B UEs in performing functional tasks.  5. Pt will lift 10 lb objects without pain to promote functional QOL.    Plan     Plan of care Certification: 3/22/2024 to 9/30/24.     Outpatient Physical Therapy 1-2 times weekly for 24 weeks to include the following interventions: Gait Training, Manual Therapy, Neuromuscular Re-ed, Therapeutic Activities, and Therapeutic Exercise.     Wayne Sandoval, PT

## 2024-07-24 ENCOUNTER — CLINICAL SUPPORT (OUTPATIENT)
Dept: REHABILITATION | Facility: HOSPITAL | Age: 74
End: 2024-07-24
Payer: MEDICARE

## 2024-07-24 DIAGNOSIS — M25.512 ACUTE PAIN OF LEFT SHOULDER: Primary | ICD-10-CM

## 2024-07-24 PROCEDURE — 97112 NEUROMUSCULAR REEDUCATION: CPT

## 2024-07-24 PROCEDURE — 97530 THERAPEUTIC ACTIVITIES: CPT

## 2024-07-24 NOTE — PROGRESS NOTES
[facility]   Physical Therapy Treatment Note      Name: Marcos Barfield  Clinic Number: [id number]    Therapy Diagnosis:        Encounter Diagnosis   Name Primary?    Non-traumatic rotator cuff tear, left       Physician: Nhan Grullon III, *    Physician Orders: PT Eval and Treat  Medical Diagnosis from Referral:   M75.102 (ICD-10-CM) - Non-traumatic rotator cuff tear, left      Evaluation Date: 3/22/2024  Authorization Period Expiration: 12/31/2024  Plan of Care Expiration: 9/30/24  Progress Note Due: Performed 7/17/24  Date of Surgery: 3/12/24  Visit # / Visits authorized: 24/30  FOTO: 1/ 3     Precautions:   POSTOPERATIVE PLAN: We will follow the arthroscopic rotator cuff repair guidelines for a small size rotator cuff tear.  We discussed with the patient's family after surgery.  The patient will remain in a sling for 6 weeks.  PT to start at 1-2 weeks.     Cuff specific program:  Pendulum exercises and Codman's exercises in 5-7 days, protecting rotator cuff repair for 1 week by avoiding active motion program until 1 week.     PASSIVE ROM: ER side 30 degrees, Forward Flex 90 degrees, ABD - 60 degrees   Full AAROM/PROM starting at 5-7 days as tolerated       Time In: 10:00 am  Time Out: 10:55 am  Total Billable Time: 30 minutes one on one   Visit Date: 7/24/2024    Subjective     Patient reports: no complaints today   He was compliant with home exercise program.  Response to previous treatment: Did well after evaluation  Functional change: ongoing    Pain: 2/10  Location: left shoulder      Objective    DOS: 3/12/24  POW: 13 weeks    Observation: wearing ABD sling in proper position     Posture: rounded shoulders      Shoulder Passive Range of Motion:   Shoulder Right Left   Flexion       ER at 90    WNL 65     AROM flexion post session: 130      Joint Mobility: NT       Treatment     Marcos received the treatments listed below:      therapeutic exercises to develop strength,  endurance, and ROM for 13 minutes including:  Prayer stretch with dowel 10x10s   Flexion  with therapist assistance - 3x5    manual therapy techniques: Joint mobilizations and Soft tissue Mobilization were applied to the: L shoulder for 15 minutes, including:    PROM check ER at 90/Flexion  GH AP, inferior Grades 1-2   Posterior cuff gentle stretch 3x20  Shoulder blade mobs     neuromuscular re-education activities to improve: Balance, Coordination, and Proprioception for 15 minutes. The following activities were included:  Standing ER at 90 degrees abd 2# 3x10  Prone abd 0# 3x10  Prone Y - 3x5   Serratus wall slides FR 3x8  S/L ER - 5# 5x5        therapeutic activities to improve functional performance for 15 minutes, including:  UBE 4/4  Lat pulldown 13# 3x15  Landmine press -  bar 3x12     Patient Education and Home Exercises       Education provided:   - POC  - HEP modifications    Written Home Exercises Provided: Patient instructed to cont prior HEP. Exercises were reviewed and Marcos was able to demonstrate them prior to the end of the session.  Marcos demonstrated good  understanding of the education provided. See Electronic Medical Record under Patient Instructions for exercises provided during therapy sessions    Assessment     Patient completed session as noted above. Good carryover with active shoulder flexion today with only slight assistance needed to achieve full. Still limited by weakness, but this is improving as evidenced by ability to increase resistance.       Marcos Is progressing well towards his goals.   Patient prognosis is Good.     Patient will continue to benefit from skilled outpatient physical therapy to address the deficits listed in the problem list box on initial evaluation, provide pt/family education and to maximize pt's level of independence in the home and community environment.     Patient's spiritual, cultural and educational needs considered and pt agreeable to plan of  care and goals.     Anticipated barriers to physical therapy: advanced age    Goals:   Post OP shoulder Goals  Short Term Goals (4 Weeks):   1. Pt will be independent with HEP to supplement PT in improving functional use of R UE. MET  2. Pt will increase pain free left shoulder elevation PROM to >/= 160 deg to improve functional mobility of UE MET  3. Pt will increase L shoulder ER PROM in 90 deg abduction to >/=20 deg to improve functional mobility of UE MET  4. Pt will increase L elbow AAROM to WNL deg in 4 weeks to improve functional mobility of UE. MET     Long Term Goals (24 Weeks):  progressing all   1. Pt will improve FOTO score to </=30% disability limited to decrease perceived limitation with L shoulder carrying, moving, and handling objects.  2. Pt will increase L shoulder PROM to WNL in all planes to improve functional use of R RUE.  3. Pt will increase L shoulder AROM to WFL in all planes to improve functional use of R RUE.  4. Pt will improve L shoulder MMTs in areas of limitation to promote equal use of B UEs in performing functional tasks.  5. Pt will lift 10 lb objects without pain to promote functional QOL.    Plan     Plan of care Certification: 3/22/2024 to 9/30/24.     Outpatient Physical Therapy 1-2 times weekly for 24 weeks to include the following interventions: Gait Training, Manual Therapy, Neuromuscular Re-ed, Therapeutic Activities, and Therapeutic Exercise.     Wayne Sandoval, PT

## 2024-07-30 NOTE — PROGRESS NOTES
Subjective:          Chief Complaint: Marcos Barfield is a 69 y.o. male who had concerns including Pain of the Right Shoulder.    70 yo male with PMHx of DM presents to clinic with c/o right shoulder pain since 2005. He is here today for right shoulder MRI results review. This is a worker's comp injury. 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 The saw was stuck and he pulled on it then started to experience right shoulder pain. He was seen by ortho after injury and had MRI of shoulder scheduled. He did not complete MRI due to evacuating for [place]. While staying in [place] after [place], he was seen by a different orthopedic physician who felt that pain was coming from neck and had MRI of neck. He states that he was told that he needed neck surgery. 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 He then followed up with ortho in [place] upon his return and received epidural injections twice with some improvement of sx but still had intermittent shoulder pain throughout.    Shoulder pain worse and more constant over past 2 weeks. Sharp pain starts in shoulder and radiates down arm. Pain is worse with overhead activities and reaching behind his back. Also c/o numbness/tingling down bilateral arms into fingertips. No new injury/trauma.    He saw Dr. Zamorano on 10/2/19 and was diagnosed with cervial pain and underwent MRI which showed spondylosis.  He then saw Ade Berumen PA-C who suggested that pain is coming from shoulder and to return back to see her for possible NICOLLE in future if continued radicular symptoms.     He has not completed any formal PT. He states that he does not take antiinflammatories because meloxicam has caused him to have elevated BP in the past.      Pain   Associated symptoms include neck pain. Pertinent negatives include no abdominal  pain, anorexia, chest pain, chills, congestion, coughing, diaphoresis, fever, joint swelling, rash or sore throat.       Review of Systems   Constitution: Negative for chills, decreased appetite, diaphoresis, fever, malaise/fatigue, night sweats, weight gain and weight loss.   HENT: Negative for congestion, ear pain and sore throat.    Eyes: Negative for blurred vision, discharge, double vision, pain, photophobia and redness.   Cardiovascular: Negative for chest pain, claudication, cyanosis, dyspnea on exertion, irregular heartbeat and leg swelling.   Respiratory: Negative for cough, hemoptysis and shortness of breath.    Endocrine: Negative for cold intolerance and heat intolerance.   Skin: Negative for color change, dry skin, flushing, itching, nail changes and rash.   Musculoskeletal: Positive for joint pain and neck pain. Negative for arthritis, back pain, falls, gout and joint swelling.   Gastrointestinal: Negative for abdominal pain, anorexia and diarrhea.                   Objective:        General: Marcos is well-developed, well-nourished, appears stated age, in no acute distress, alert and oriented to time, place and person.     General    Vitals reviewed.  Constitutional: He is oriented to person, place, and time. He appears well-developed and well-nourished. No distress.   Neck: Normal range of motion.   Cardiovascular: Normal rate and regular rhythm.    Pulmonary/Chest: Effort normal. No respiratory distress.   Neurological: He is alert and oriented to person, place, and time.   Psychiatric: He has a normal mood and affect. His behavior is normal. Thought content normal.         Back (L-Spine & T-Spine) / Neck (C-Spine) Exam     Tenderness   The patient is tender to palpation of the right trapezial.   Right Shoulder Exam     Inspection/Observation   Swelling: absent  Bruising: absent  Scars: absent  Deformity: absent  Scapular Winging: absent  Scapular Dyskinesia: negative  Atrophy: absent    Tenderness    The patient is tender to palpation of the biceps tendon, acromioclavicular joint and supraspinatus (subscapularis).    Range of Motion   Active abduction:  120 abnormal   Passive abduction:  120 abnormal   Extension: 30   Forward Flexion:  120 (+ pain) abnormal Adduction: 40   External Rotation 0 degrees: 40   Internal rotation 0 degrees:  Sacrum abnormal     Tests & Signs   Apprehension: negative  Cross arm: positive  Drop arm: negative  Painter test: positive  Impingement: positive  Anterosuperior Escape: negative  Lift Off Sign: positive  Belly Press: positive  Active Compression Test (Audubon's Sign): negative  Yergason's Test: negative  Speed's Test: positive    Other   Sensation: normal    Left Shoulder Exam     Inspection/Observation   Swelling: absent  Bruising: absent  Scars: absent  Deformity: absent  Scapular Winging: absent  Scapular Dyskinesia: negative  Atrophy: absent    Tenderness   The patient is experiencing no tenderness.     Range of Motion   Active abduction: 140   Passive abduction: 140   Extension: 30   Forward Flexion: 140 Adduction: 40   External Rotation 0 degrees: 40   Internal rotation 0 degrees: T6     Tests & Signs   Apprehension: negative  Cross arm: negative  Drop arm: negative  Painter test: negative  Impingement: negative  Anterosuperior Escape: negative  Lift Off Sign: negative  Belly Press: negative  Active Compression test (Audubon's Sign): negative  Yergasons's Test: negative  Speed's Test: negative    Other   Sensation: normal       Muscle Strength   Right Upper Extremity   Shoulder Abduction: 4/5   Shoulder Internal Rotation: 4/5   Shoulder External Rotation: 4/5   Supraspinatus: 4/5 (+ pain)/5   Subscapularis: 4/5/5   Biceps: 4/5/5   Left Upper Extremity  Shoulder Abduction: 5/5   Shoulder Internal Rotation: 5/5   Shoulder External Rotation: 5/5   Supraspinatus: 5/5/5   Subscapularis: 5/5/5   Biceps: 4/5/5       RADIOGRAPHS:    Xray right shoulder (10/17/19):  Visualized  osseous structures appear unremarkable, with no evidence of recent or healing fracture, lytic destructive process, or appreciable glenohumeral arthritic change noted.  No glenohumeral dislocation.  No abnormal soft tissue calcifications.    MRI cervical spine (10/8/19):  Cervical spondylosis, resulting in mild/ moderate neural foraminal narrowing from C2-C3 through C6-C7, as above.  No spinal canal stenosis.    MRI right shoulder: (10/23/19)  FINDINGS:  Rotator cuff: There is a high-grade partial thickness undersurface tear of the supraspinatus at the footprint, measuring 2.0 cm AP x 0.7 cm TV.  Infraspinatus, subscapularis, and teres minor tendons are intact.  Muscle bulk is maintained.    Labrum: Glenoid labrum is intact.    Biceps: Long head biceps tendon is intact.    Bone: There is no fracture.  Bone marrow signal is unremarkable.    Acromioclavicular joint: Moderate arthrosis with subacromial spur.    Cartilage: Articular cartilage of the glenohumeral joint is preserved.    Miscellaneous: Thickening of subacromial subdeltoid bursa.  Nonspecific thickening of inferior glenohumeral ligament.              Assessment:       Encounter Diagnoses   Name Primary?    Traumatic incomplete tear of right rotator cuff, subsequent encounter Yes    Biceps tendonitis, right     DJD of right AC (acromioclavicular) joint           Plan:       1. Physical therapy referral to [facility] for RC and periscapular strengthening- 12 visits approved by worker's comp. Patient has not yet started.    2. I made the decision to obtain old records of the patient including previous notes and imaging. I independently reviewed and interpreted the radiographs today as well as prior imaging. Reviewed radiographs and MRI with patient in detail.    3. Tylenol prn pain    4.Injection Procedure right shoulder  A time out was performed, including verification of patient ID, procedure, site and side, availability of information and  equipment, review of safety issues, and agreement with consent, the procedure site was marked.    After time out was performed, the patient was prepped aseptically with povidone-iodine swabsticks. A diagnostic and therapeutic injection of 1:3cc Kenalog/Marcaine was given under sterile technique using a 22g x 1.5 needle from the Posterior  aspect of the right Subacromial in the sitting position.      Marcos Barfield had no adverse reactions to the medication. Pain decreased. He was instructed to apply ice to the joint for 20 minutes and avoid strenuous activities for 24-36 hours following the injection. He was warned of possible blood sugar and/or blood pressure changes during that time. Following that time, he can resume regular activities.    He was reminded to call the clinic immediately for any adverse side effects as explained in clinic today.    5.RTC in 6 weeks with Florina Ocampo PA-C for follow up. If continued pain, will refer to surgeon.                  see mdm

## 2024-08-05 ENCOUNTER — OFFICE VISIT (OUTPATIENT)
Dept: SPORTS MEDICINE | Facility: CLINIC | Age: 74
End: 2024-08-05
Payer: MEDICARE

## 2024-08-05 ENCOUNTER — CLINICAL SUPPORT (OUTPATIENT)
Dept: REHABILITATION | Facility: HOSPITAL | Age: 74
End: 2024-08-05
Payer: MEDICARE

## 2024-08-05 VITALS
HEIGHT: 70 IN | SYSTOLIC BLOOD PRESSURE: 174 MMHG | HEART RATE: 59 BPM | DIASTOLIC BLOOD PRESSURE: 77 MMHG | WEIGHT: 158.75 LBS | BODY MASS INDEX: 22.73 KG/M2

## 2024-08-05 DIAGNOSIS — M25.512 ACUTE PAIN OF LEFT SHOULDER: Primary | ICD-10-CM

## 2024-08-05 DIAGNOSIS — M25.512 LEFT SHOULDER PAIN, UNSPECIFIED CHRONICITY: ICD-10-CM

## 2024-08-05 DIAGNOSIS — Z98.890 S/P ROTATOR CUFF SURGERY: Primary | ICD-10-CM

## 2024-08-05 PROCEDURE — 99214 OFFICE O/P EST MOD 30 MIN: CPT | Mod: S$GLB,,, | Performed by: ORTHOPAEDIC SURGERY

## 2024-08-05 PROCEDURE — 3077F SYST BP >= 140 MM HG: CPT | Mod: CPTII,S$GLB,, | Performed by: ORTHOPAEDIC SURGERY

## 2024-08-05 PROCEDURE — 1125F AMNT PAIN NOTED PAIN PRSNT: CPT | Mod: CPTII,S$GLB,, | Performed by: ORTHOPAEDIC SURGERY

## 2024-08-05 PROCEDURE — 97112 NEUROMUSCULAR REEDUCATION: CPT

## 2024-08-05 PROCEDURE — 1101F PT FALLS ASSESS-DOCD LE1/YR: CPT | Mod: CPTII,S$GLB,, | Performed by: ORTHOPAEDIC SURGERY

## 2024-08-05 PROCEDURE — 99999 PR PBB SHADOW E&M-EST. PATIENT-LVL IV: CPT | Mod: PBBFAC,,, | Performed by: ORTHOPAEDIC SURGERY

## 2024-08-05 PROCEDURE — 97530 THERAPEUTIC ACTIVITIES: CPT

## 2024-08-05 PROCEDURE — 3288F FALL RISK ASSESSMENT DOCD: CPT | Mod: CPTII,S$GLB,, | Performed by: ORTHOPAEDIC SURGERY

## 2024-08-05 PROCEDURE — 1159F MED LIST DOCD IN RCRD: CPT | Mod: CPTII,S$GLB,, | Performed by: ORTHOPAEDIC SURGERY

## 2024-08-05 PROCEDURE — 3078F DIAST BP <80 MM HG: CPT | Mod: CPTII,S$GLB,, | Performed by: ORTHOPAEDIC SURGERY

## 2024-08-05 RX ORDER — EZETIMIBE 10 MG/1
10 TABLET ORAL DAILY
COMMUNITY
Start: 2024-01-05

## 2024-08-05 RX ORDER — HYDROCHLOROTHIAZIDE 25 MG/1
25 TABLET ORAL DAILY
COMMUNITY
Start: 2024-04-11

## 2024-08-05 NOTE — PROGRESS NOTES
Chief Complaint: left shoulder pain    HISTORY OF PRESENT ILLNESS:   Pt is here today for follow up of shoulder arthroscopy.  he is doing well.  We have reviewed patient's findings and discussed plan of care and future treatment options.      Patient has been attending physical therapy at the [facility] location, working with Wayne DOWNEY. He notes performing his HEP daily     He notes some pain with sleeping and some soreness after his physical therapy   No other issues reported     Pain today 0/10  SANE post op: 85  SANE pre op: 60     DATE OF PROCEDURE: 03/12/2024  SURGEON: Kayleen Sherman M.D.  OPERATION:   left  1. Shoulder arthroscopic rotator cuff repair (CPT 34953) with CuffMend () ([id number] 40% partial thickness bursal)  2. Shoulder arthroscopic biceps tenodesis (CPT 42953)  3. Shoulder arthroscopic subacromial decompression, bursectomy   4. Shoulder arthroscopic extensive debridement (anterior, posterior glenohumeral joint, subacromial space) (CPT 82491)  5. Shoulder arthroscopic labral debridement (CPT 50668)  6. Shoulder arthroscopic lysis of adhesions (CPT 85270)    Partial thickness 40% cuff tearing on bursal side was debrided with shaver and gently with thermal device.     Review of Systems   Constitution: Negative. Negative for chills, fever and night sweats.   HENT: Negative for congestion and headaches.    Eyes: Negative for blurred vision, left vision loss and right vision loss.   Cardiovascular: Negative for chest pain and syncope.   Respiratory: Negative for cough and shortness of breath.    Endocrine: Negative for polydipsia, polyphagia and polyuria.   Hematologic/Lymphatic: Negative for bleeding problem. Does not bruise/bleed easily.   Skin: Negative for dry skin, itching and rash.   Musculoskeletal: Negative for falls and muscle weakness.   Gastrointestinal: Negative for abdominal pain and bowel incontinence.   Genitourinary: Negative for bladder incontinence and nocturia.   Neurological:  Negative for disturbances in coordination, loss of balance and seizures.   Psychiatric/Behavioral: Negative for depression. The patient does not have insomnia.    Allergic/Immunologic: Negative for hives and persistent infections.       PAST MEDICAL HISTORY:   Past Medical History:   Diagnosis Date    Anemia     kid     CKD (chronic kidney disease)     Diabetes mellitus     High cholesterol     Hypertension      PAST SURGICAL HISTORY:   Past Surgical History:   Procedure Laterality Date    ARTHROSCOPIC DEBRIDEMENT OF SHOULDER Right 8/20/2020    Procedure: DEBRIDEMENT, SHOULDER, ARTHROSCOPIC;  Surgeon: Kayleen Sherman MD;  Location: [facility] OR;  Service: Orthopedics;  Laterality: Right;    ARTHROSCOPIC REPAIR OF ROTATOR CUFF OF SHOULDER Right 8/20/2020    Procedure: REPAIR, ROTATOR CUFF, ARTHROSCOPIC;  Surgeon: Kayleen Sherman MD;  Location: [facility] OR;  Service: Orthopedics;  Laterality: Right;    ARTHROSCOPIC REPAIR OF ROTATOR CUFF OF SHOULDER Left 3/12/2024    Procedure: REPAIR, ROTATOR CUFF, ARTHROSCOPIC--with patch;  Surgeon: Kayleen Sherman MD;  Location: [facility] OR;  Service: Orthopedics;  Laterality: Left;    ARTHROSCOPY OF SHOULDER WITH DECOMPRESSION OF SUBACROMIAL SPACE Left 3/12/2024    Procedure: ARTHROSCOPY, SHOULDER, WITH SUBACROMIAL SPACE DECOMPRESSION;  Surgeon: Kayleen Sherman MD;  Location: [facility] OR;  Service: Orthopedics;  Laterality: Left;    ARTHROSCOPY OF SHOULDER WITH REMOVAL OF DISTAL CLAVICLE Right 8/20/2020    Procedure: ARTHROSCOPY, SHOULDER, WITH DISTAL CLAVICLE EXCISION;  Surgeon: Kayleen Sherman MD;  Location: [facility] OR;  Service: Orthopedics;  Laterality: Right;    COLONOSCOPY      x2    CYST REMOVAL      mid back    FIXATION OF TENDON Right 8/20/2020    Procedure: FIXATION, TENDON, Biceps Tenodesis;  Surgeon: Kayleen Sherman MD;  Location: [facility] OR;  Service: Orthopedics;  Laterality: Right;  FIXATION, TENDON, Biceps Tenodesis    FIXATION OF TENDON Left 3/12/2024    Procedure: FIXATION, TENDON--bicep tenodesis;   Surgeon: Kayleen Sherman MD;  Location: [facility] OR;  Service: Orthopedics;  Laterality: Left;    ORIF HUMERUS FRACTURE Right 8/20/2020    Procedure: ORIF, FRACTURE, HUMERUS;  Surgeon: Kayleen Sherman MD;  Location: [facility] OR;  Service: Orthopedics;  Laterality: Right;    SHOULDER SURGERY      VASECTOMY       FAMILY HISTORY: No family history on file.  SOCIAL HISTORY:   Social History     Socioeconomic History    Marital status:    Tobacco Use    Smoking status: Never    Smokeless tobacco: Never   Substance and Sexual Activity    Alcohol use: No    Drug use: No    Sexual activity: Yes       MEDICATIONS:   Current Outpatient Medications:     antiox #8/om3/dha/epa/lut/zeax (PRESERVISION AREDS 2, OMEGA-3, ORAL), Take by mouth., Disp: , Rfl:     ascorbic acid-bioflavonoids 200-300 mg Tab, TAKE  BY MOUTH, Disp: , Rfl:     aspirin (ECOTRIN) 81 MG EC tablet, Take 81 mg by mouth once daily.  , Disp: , Rfl:     beta-carotene,A,-vits C,E/mins (OCUVITE ORAL), Take 1 tablet by mouth 2 (two) times a day., Disp: , Rfl:     cyanocobalamin (VITAMIN B-12) 1000 MCG tablet, TAKE ONE TABLET BY MOUTH EVERY DAY FOR VITAMIN DEFICIENCIES, Disp: , Rfl:     ezetimibe (ZETIA) 10 mg tablet, Take 10 mg by mouth once daily., Disp: , Rfl:     ezetimibe-simvastatin 10-10 mg (VYTORIN) 10-10 mg per tablet, Take 1 tablet by mouth every evening., Disp: , Rfl:     fluticasone (FLONASE) 50 mcg/actuation nasal spray, , Disp: , Rfl: 0    gabapentin (NEURONTIN) 300 MG capsule, Take 300 mg by mouth 3 (three) times daily., Disp: , Rfl:     hydroCHLOROthiazide (HYDRODIURIL) 25 MG tablet, Take 25 mg by mouth once daily., Disp: , Rfl:     insulin aspart U-100 (NOVOLOG) 100 unit/mL (3 mL) InPn pen, INJECT 5 UNITS SUBCUTANEOUSLY WITH SUPPER FOR DIABETES  WITH FIRST BITE OF MAIN MEAL..   DONOTINJECT ASPART AT BEDTIME. DO NOT INJECT ASPART IF YOU ARE SKIPPING  THE ASSOCIATED MEAL. FOR DIABETES  WITH FIRST BITE OF MAIN MEAL..    DONOTINJECT ASPART AT BEDTIME. DO NOT  INJECT ASPART IF YOU ARE SKIPPING   THE ASSOCIATED MEAL., Disp: , Rfl:     insulin glargine (LANTUS) 100 unit/mL injection, Inject 32 Units into the skin every evening.  , Disp: , Rfl:     lisinopril (PRINIVIL,ZESTRIL) 20 MG tablet, Take 20 mg by mouth once daily., Disp: , Rfl:     lisinopriL (PRINIVIL,ZESTRIL) 40 MG tablet, TAKE ONE-HALF TABLET BY MOUTH QD FOR HEART/BLOOD PRESSURE, Disp: , Rfl:     lisinopriL 10 MG tablet, Take 10 mg by mouth., Disp: , Rfl:     metFORMIN (GLUMETZA) 1000 MG (MOD) 24hr tablet, Take 1,000 mg by mouth daily with breakfast., Disp: , Rfl:     mupirocin (BACTROBAN) 2 % ointment, Apply topically 2 (two) times daily., Disp: 22 g, Rfl: 0    pravastatin (PRAVACHOL) 80 MG tablet, Take 80 mg by mouth every evening., Disp: , Rfl:     sildenafiL (VIAGRA) 100 MG tablet, TAKE ONE-HALF TABLET BY MOUTH EVERY DAY 30 TO 60 MINUTES BEFORE INTERCOURSE FOR BEST RESULTS TAKE ON EMPTY STOMACH, Disp: , Rfl:     simvastatin (ZOCOR) 80 MG tablet, Take 80 mg by mouth every evening., Disp: , Rfl:     docusate sodium (COLACE) 100 MG capsule, Take 1 capsule (100 mg total) by mouth 2 (two) times daily as needed for Constipation. (Patient not taking: Reported on 3/25/2024), Disp: 20 capsule, Rfl: 0    meclizine (ANTIVERT) 25 mg tablet, , Disp: , Rfl: 3    ondansetron (ZOFRAN) 4 MG tablet, Take 1 tablet (4 mg total) by mouth every 8 (eight) hours as needed for Nausea. (Patient not taking: Reported on 8/5/2024), Disp: 12 tablet, Rfl: 0    oxyCODONE-acetaminophen (PERCOCET)  mg per tablet, Take 1 tablet by mouth every 4-6 hours as needed for pain. Take stool softener with this medication. (Patient not taking: Reported on 8/5/2024), Disp: 21 tablet, Rfl: 0    promethazine (PHENERGAN) 25 MG tablet, Take 1 tablet (25 mg total) by mouth every 6 (six) hours as needed for Nausea. (Patient not taking: Reported on 8/5/2024), Disp: 8 tablet, Rfl: 0    traMADoL (ULTRAM) 50 mg tablet, Take 1-2 tablets ( mg total) by  "mouth every 6 (six) hours as needed for Pain. (Patient not taking: Reported on 6/21/2024), Disp: 21 tablet, Rfl: 0  ALLERGIES:   Review of patient's allergies indicates:   Allergen Reactions    Meloxicam Other (See Comments)     Drove blood pressure james high per pt       VITAL SIGNS: BP (!) 174/77   Pulse (!) 59   Ht 5' 10" (1.778 m)   Wt 72 kg (158 lb 11.7 oz)   BMI 22.78 kg/m²                                                                                     PHYSICAL EXAMINATION:     Incision sites healed well  No evidence of any erythema, infection or induration  elbow Range of motion full   No effusion  2+ Radial pulses  No swelling            ROM:      Forward Elevation: 150  ER: 40  IR: T12    Strength  Scaption at 0 and 30: 5/5  ER: 5/5                                                                         ASSESSMENT:                                                                                                                                               1. Status post above, doing well.                                                                                                                               PLAN:                                                                                                                                                     1. Continue PT; case discussed with therapist   2. Emphasized scapular function.  3. I have discussed return to activity in detail.  4. Patient will see us back in 6 months.                                      5. All questions were answered, surgical technique was reviewed and interpreted, and patient should contact us with any questions or concerns in the interim.                                                                                                   I made the decision to obtain old records of the patient including previous notes and imaging. I independently reviewed and interpreted the radiographs and/or MRIs today as well as " prior imaging.

## 2024-08-12 ENCOUNTER — CLINICAL SUPPORT (OUTPATIENT)
Dept: REHABILITATION | Facility: HOSPITAL | Age: 74
End: 2024-08-12
Payer: MEDICARE

## 2024-08-12 DIAGNOSIS — M25.512 ACUTE PAIN OF LEFT SHOULDER: Primary | ICD-10-CM

## 2024-08-12 PROCEDURE — 97530 THERAPEUTIC ACTIVITIES: CPT

## 2024-08-12 PROCEDURE — 97110 THERAPEUTIC EXERCISES: CPT

## 2024-08-12 PROCEDURE — 97112 NEUROMUSCULAR REEDUCATION: CPT

## 2024-08-12 NOTE — PROGRESS NOTES
[facility]   Physical Therapy Treatment Note      Name: Marcos Barfield  Clinic Number: [id number]    Therapy Diagnosis:        Encounter Diagnosis   Name Primary?    Non-traumatic rotator cuff tear, left       Physician: Nhan Grullon III, *    Physician Orders: PT Eval and Treat  Medical Diagnosis from Referral:   M75.102 (ICD-10-CM) - Non-traumatic rotator cuff tear, left      Evaluation Date: 3/22/2024  Authorization Period Expiration: 12/31/2024  Plan of Care Expiration: 9/30/24  Progress Note Due: Performed 7/17/24  Date of Surgery: 3/12/24  Visit # / Visits authorized: 24/30  FOTO: 1/ 3     Precautions:   POSTOPERATIVE PLAN: We will follow the arthroscopic rotator cuff repair guidelines for a small size rotator cuff tear.  We discussed with the patient's family after surgery.  The patient will remain in a sling for 6 weeks.  PT to start at 1-2 weeks.     Cuff specific program:  Pendulum exercises and Codman's exercises in 5-7 days, protecting rotator cuff repair for 1 week by avoiding active motion program until 1 week.     PASSIVE ROM: ER side 30 degrees, Forward Flex 90 degrees, ABD - 60 degrees   Full AAROM/PROM starting at 5-7 days as tolerated       Time In: 9:00 am  Time Out: 10:00 am  Total Billable Time: 15 minutes one on one   Visit Date: 8/12/2024    Subjective     Patient reports: doing well overall but did have a fall. Dosent think he injured it but does note soreness since then  He was compliant with home exercise program.  Response to previous treatment: Did well after evaluation  Functional change: ongoing    Pain: 2/10  Location: left shoulder      Objective    DOS: 3/12/24  POW:  5 months        MMT L shoulder: grossly 4/5        Treatment     Marcos received the treatments listed below:      therapeutic exercises to develop strength, endurance, and ROM for 15 minutes including:  FOTO and strength assessment   Front and Lateral raises 2# 2x10      manual therapy  techniques: Joint mobilizations and Soft tissue Mobilization were applied to the: L shoulder for 00 minutes, including:    PROM check ER at 90/Flexion  GH AP, inferior Grades 1-2   Posterior cuff gentle stretch 3x20  Shoulder blade mobs     neuromuscular re-education activities to improve: Balance, Coordination, and Proprioception for 30 minutes. The following activities were included:  Ball on wall ABCs - 2x  Standing 90/90 ER OTB - 3x10   Standing 90/90 IR BTB - 3x10    therapeutic activities to improve functional performance for 15 minutes, including:  UBE 4/4  Overhead press 3# 5x5    Patient Education and Home Exercises       Education provided:   - POC  - HEP modifications    Written Home Exercises Provided: Patient instructed to cont prior HEP. Exercises were reviewed and Marcos was able to demonstrate them prior to the end of the session.  Marcos demonstrated good  understanding of the education provided. See Electronic Medical Record under Patient Instructions for exercises provided during therapy sessions    Assessment     Pt continues to make good progressions towards goals able to perform 90/90 ER and IR with min compensations. Also added ball on wall for GHJ for stability. Overall doing wll.        Marcos Is progressing well towards his goals.   Patient prognosis is Good.     Patient will continue to benefit from skilled outpatient physical therapy to address the deficits listed in the problem list box on initial evaluation, provide pt/family education and to maximize pt's level of independence in the home and community environment.     Patient's spiritual, cultural and educational needs considered and pt agreeable to plan of care and goals.     Anticipated barriers to physical therapy: advanced age    Goals:   Post OP shoulder Goals  Short Term Goals (4 Weeks):   1. Pt will be independent with HEP to supplement PT in improving functional use of R UE. MET  2. Pt will increase pain free left shoulder  elevation PROM to >/= 160 deg to improve functional mobility of UE MET  3. Pt will increase L shoulder ER PROM in 90 deg abduction to >/=20 deg to improve functional mobility of UE MET  4. Pt will increase L elbow AAROM to WNL deg in 4 weeks to improve functional mobility of UE. MET     Long Term Goals (24 Weeks):  progressing all   1. Pt will improve FOTO score to </=30% disability limited to decrease perceived limitation with L shoulder carrying, moving, and handling objects.  2. Pt will increase L shoulder PROM to WNL in all planes to improve functional use of R RUE.  3. Pt will increase L shoulder AROM to WFL in all planes to improve functional use of R RUE.  4. Pt will improve L shoulder MMTs in areas of limitation to promote equal use of B UEs in performing functional tasks.  5. Pt will lift 10 lb objects without pain to promote functional QOL.    Plan     Plan of care Certification: 3/22/2024 to 9/30/24.     Outpatient Physical Therapy 1-2 times weekly for 24 weeks to include the following interventions: Gait Training, Manual Therapy, Neuromuscular Re-ed, Therapeutic Activities, and Therapeutic Exercise.     Wayne Sandoval, PT

## 2024-08-19 ENCOUNTER — CLINICAL SUPPORT (OUTPATIENT)
Dept: REHABILITATION | Facility: HOSPITAL | Age: 74
End: 2024-08-19
Payer: MEDICARE

## 2024-08-19 DIAGNOSIS — M25.512 ACUTE PAIN OF LEFT SHOULDER: Primary | ICD-10-CM

## 2024-08-19 PROCEDURE — 97530 THERAPEUTIC ACTIVITIES: CPT

## 2024-08-19 NOTE — PROGRESS NOTES
"  [facility]   Physical Therapy Treatment Note      Name: Marcos Barfield  Clinic Number: [id number]    Therapy Diagnosis:        Encounter Diagnosis   Name Primary?    Non-traumatic rotator cuff tear, left       Physician: Nhan Grullon III, *    Physician Orders: PT Eval and Treat  Medical Diagnosis from Referral:   M75.102 (ICD-10-CM) - Non-traumatic rotator cuff tear, left      Evaluation Date: 3/22/2024  Authorization Period Expiration: 12/31/2024  Plan of Care Expiration: 9/30/24  Progress Note Due: Performed 7/17/24  Date of Surgery: 3/12/24  Visit # / Visits authorized: 34/50  FOTO: 1/ 3     Precautions:   POSTOPERATIVE PLAN: We will follow the arthroscopic rotator cuff repair guidelines for a small size rotator cuff tear.  We discussed with the patient's family after surgery.  The patient will remain in a sling for 6 weeks.  PT to start at 1-2 weeks.     Cuff specific program:  Pendulum exercises and Codman's exercises in 5-7 days, protecting rotator cuff repair for 1 week by avoiding active motion program until 1 week.     PASSIVE ROM: ER side 30 degrees, Forward Flex 90 degrees, ABD - 60 degrees   Full AAROM/PROM starting at 5-7 days as tolerated       Time In: 9:00 am  Time Out: 9:55 am  Total Billable Time: 30 minutes one on one   Visit Date: 8/19/2024    Subjective     Patient reports: reports to me that he has been having "mild shooing pains" in his L arm at night. Does note that he tried heavier weights at home since our last PT session   He was compliant with home exercise program.  Response to previous treatment: Did well after evaluation  Functional change: ongoing    Pain: 2/10  Location: left shoulder      Objective    DOS: 3/12/24  POW:  5 months        MMT L shoulder: grossly 4/5        Treatment     Marcos received the treatments listed below:      therapeutic exercises to develop strength, endurance, and ROM for 00 minutes including:      manual therapy techniques: " Joint mobilizations and Soft tissue Mobilization were applied to the: L shoulder for 00 minutes, including:    neuromuscular re-education activities to improve: Balance, Coordination, and Proprioception for 25 minutes. The following activities were included:  Standing 90/90 ER OTB - 3x10   S/L ER 5# 5x5   Prone abduction 4x5    therapeutic activities to improve functional performance for 30 minutes, including:  UBE 4/4  Overhead press 3# 3x fatigue   Landmine press Trainer bar 3x15   Pushups at mat 3x10     Patient Education and Home Exercises       Education provided:   - POC  - HEP modifications    Written Home Exercises Provided: Patient instructed to cont prior HEP. Exercises were reviewed and Marcos was able to demonstrate them prior to the end of the session.  Marcos demonstrated good  understanding of the education provided. See Electronic Medical Record under Patient Instructions for exercises provided during therapy sessions    Assessment     Pt continues to make good progressions towards full overhead motion. Still lacking ~10 degrees of full elevation likely due to continued weakness. Able to progress functional exercises of overhead carrying and pushups today with good van. Will cont to progress accordingly       Marcos Is progressing well towards his goals.   Patient prognosis is Good.     Patient will continue to benefit from skilled outpatient physical therapy to address the deficits listed in the problem list box on initial evaluation, provide pt/family education and to maximize pt's level of independence in the home and community environment.     Patient's spiritual, cultural and educational needs considered and pt agreeable to plan of care and goals.     Anticipated barriers to physical therapy: advanced age    Goals:   Post OP shoulder Goals  Short Term Goals (4 Weeks):   1. Pt will be independent with HEP to supplement PT in improving functional use of R UE. MET  2. Pt will increase pain free left  shoulder elevation PROM to >/= 160 deg to improve functional mobility of UE MET  3. Pt will increase L shoulder ER PROM in 90 deg abduction to >/=20 deg to improve functional mobility of UE MET  4. Pt will increase L elbow AAROM to WNL deg in 4 weeks to improve functional mobility of UE. MET     Long Term Goals (24 Weeks):  progressing all   1. Pt will improve FOTO score to </=30% disability limited to decrease perceived limitation with L shoulder carrying, moving, and handling objects.  2. Pt will increase L shoulder PROM to WNL in all planes to improve functional use of R RUE.  3. Pt will increase L shoulder AROM to WFL in all planes to improve functional use of R RUE.  4. Pt will improve L shoulder MMTs in areas of limitation to promote equal use of B UEs in performing functional tasks.  5. Pt will lift 10 lb objects without pain to promote functional QOL.    Plan     Plan of care Certification: 3/22/2024 to 9/30/24.     Outpatient Physical Therapy 1-2 times weekly for 24 weeks to include the following interventions: Gait Training, Manual Therapy, Neuromuscular Re-ed, Therapeutic Activities, and Therapeutic Exercise.     Wayne Sandoval, PT

## 2024-08-26 ENCOUNTER — CLINICAL SUPPORT (OUTPATIENT)
Dept: REHABILITATION | Facility: HOSPITAL | Age: 74
End: 2024-08-26
Payer: MEDICARE

## 2024-08-26 DIAGNOSIS — M25.512 ACUTE PAIN OF LEFT SHOULDER: Primary | ICD-10-CM

## 2024-08-26 PROCEDURE — 97112 NEUROMUSCULAR REEDUCATION: CPT

## 2024-08-26 PROCEDURE — 97530 THERAPEUTIC ACTIVITIES: CPT

## 2024-08-26 NOTE — PROGRESS NOTES
[facility]   Physical Therapy Treatment Note      Name: Marcos Barfield  Clinic Number: [id number]    Therapy Diagnosis:        Encounter Diagnosis   Name Primary?    Non-traumatic rotator cuff tear, left       Physician: Nhan Grullon III, *    Physician Orders: PT Eval and Treat  Medical Diagnosis from Referral:   M75.102 (ICD-10-CM) - Non-traumatic rotator cuff tear, left      Evaluation Date: 3/22/2024  Authorization Period Expiration: 12/31/2024  Plan of Care Expiration: 9/30/24  Progress Note Due: Performed 7/17/24  Date of Surgery: 3/12/24  Visit # / Visits authorized: 34/50  FOTO: 1/ 3     Precautions:   POSTOPERATIVE PLAN: We will follow the arthroscopic rotator cuff repair guidelines for a small size rotator cuff tear.  We discussed with the patient's family after surgery.  The patient will remain in a sling for 6 weeks.  PT to start at 1-2 weeks.     Cuff specific program:  Pendulum exercises and Codman's exercises in 5-7 days, protecting rotator cuff repair for 1 week by avoiding active motion program until 1 week.     PASSIVE ROM: ER side 30 degrees, Forward Flex 90 degrees, ABD - 60 degrees   Full AAROM/PROM starting at 5-7 days as tolerated       Time In: 9:00 am  Time Out: 9:55 am  Total Billable Time: 30 minutes one on one   Visit Date: 8/26/2024    Subjective     Patient reports: still gets pain at night, is better when he sleeps in his recliner   He was compliant with home exercise program.  Response to previous treatment: Did well after evaluation  Functional change: ongoing    Pain: 2/10  Location: left shoulder      Objective    DOS: 3/12/24  POW:  5 months    MMT L shoulder: grossly 4/5        Treatment     Marcos received the treatments listed below:      therapeutic exercises to develop strength, endurance, and ROM for 05 minutes including:  Abduction wall slides with PT overpressure x10    manual therapy techniques: Joint mobilizations and Soft tissue  Mobilization were applied to the: L shoulder for 00 minutes, including:    neuromuscular re-education activities to improve: Balance, Coordination, and Proprioception for 30 minutes. The following activities were included:  Standing 90/90 ER OTB - 3x10   S/L ER 5# 5x5   Prone T 3x5  Prone Y 3x5  Standing serratus punch 3x8 BTB  ABCs 2x fatigue       therapeutic activities to improve functional performance for 25 minutes, including:  Pulleys 5 mins   Overhead press 3# 4x5  Pushups at mat 3x10   Cable pulldowns abd - 10# x20     Patient Education and Home Exercises       Education provided:   - POC  - HEP modifications    Written Home Exercises Provided: Patient instructed to cont prior HEP. Exercises were reviewed and Marcos was able to demonstrate them prior to the end of the session.  Marcos demonstrated good  understanding of the education provided. See Electronic Medical Record under Patient Instructions for exercises provided during therapy sessions    Assessment     Pt continues to make good progressions towards full overhead motion. Still lacking ~10 degrees of full elevation likely due to continued weakness. Able to progress functional exercises of overhead carrying and pushups today with good van. Will cont to progress accordingly       Marcos Is progressing well towards his goals.   Patient prognosis is Good.     Patient will continue to benefit from skilled outpatient physical therapy to address the deficits listed in the problem list box on initial evaluation, provide pt/family education and to maximize pt's level of independence in the home and community environment.     Patient's spiritual, cultural and educational needs considered and pt agreeable to plan of care and goals.     Anticipated barriers to physical therapy: advanced age    Goals:   Post OP shoulder Goals  Short Term Goals (4 Weeks):   1. Pt will be independent with HEP to supplement PT in improving functional use of R UE. MET  2. Pt will  increase pain free left shoulder elevation PROM to >/= 160 deg to improve functional mobility of UE MET  3. Pt will increase L shoulder ER PROM in 90 deg abduction to >/=20 deg to improve functional mobility of UE MET  4. Pt will increase L elbow AAROM to WNL deg in 4 weeks to improve functional mobility of UE. MET     Long Term Goals (24 Weeks):  progressing all   1. Pt will improve FOTO score to </=30% disability limited to decrease perceived limitation with L shoulder carrying, moving, and handling objects.  2. Pt will increase L shoulder PROM to WNL in all planes to improve functional use of R RUE.  3. Pt will increase L shoulder AROM to WFL in all planes to improve functional use of R RUE.  4. Pt will improve L shoulder MMTs in areas of limitation to promote equal use of B UEs in performing functional tasks.  5. Pt will lift 10 lb objects without pain to promote functional QOL.    Plan     Plan of care Certification: 3/22/2024 to 9/30/24.     Outpatient Physical Therapy 1-2 times weekly for 24 weeks to include the following interventions: Gait Training, Manual Therapy, Neuromuscular Re-ed, Therapeutic Activities, and Therapeutic Exercise.     Wayne Sandoval, PT

## 2024-09-04 ENCOUNTER — CLINICAL SUPPORT (OUTPATIENT)
Dept: REHABILITATION | Facility: HOSPITAL | Age: 74
End: 2024-09-04
Payer: MEDICARE

## 2024-09-04 DIAGNOSIS — M25.512 ACUTE PAIN OF LEFT SHOULDER: Primary | ICD-10-CM

## 2024-09-04 PROCEDURE — 97530 THERAPEUTIC ACTIVITIES: CPT

## 2024-09-04 NOTE — PROGRESS NOTES
[facility]   Physical Therapy Treatment Note      Name: Marcos Barfield  Clinic Number: [id number]    Therapy Diagnosis:        Encounter Diagnosis   Name Primary?    Non-traumatic rotator cuff tear, left       Physician: Nhan Grullon III, *    Physician Orders: PT Eval and Treat  Medical Diagnosis from Referral:   M75.102 (ICD-10-CM) - Non-traumatic rotator cuff tear, left      Evaluation Date: 3/22/2024  Authorization Period Expiration: 12/31/2024  Plan of Care Expiration: 9/30/24  Progress Note Due: Performed 7/17/24  Date of Surgery: 3/12/24  Visit # / Visits authorized: 36/50  FOTO: 1/ 3     Precautions:   POSTOPERATIVE PLAN: We will follow the arthroscopic rotator cuff repair guidelines for a small size rotator cuff tear.  We discussed with the patient's family after surgery.  The patient will remain in a sling for 6 weeks.  PT to start at 1-2 weeks.     Cuff specific program:  Pendulum exercises and Codman's exercises in 5-7 days, protecting rotator cuff repair for 1 week by avoiding active motion program until 1 week.     PASSIVE ROM: ER side 30 degrees, Forward Flex 90 degrees, ABD - 60 degrees   Full AAROM/PROM starting at 5-7 days as tolerated       Time In: 10:00 am  Time Out: 11:00 am  Total Billable Time: 30 minutes one on one   Visit Date: 9/4/2024    Subjective     Patient reports: soreness has subsided   He was compliant with home exercise program.  Response to previous treatment: Did well after evaluation  Functional change: ongoing    Pain: 2/10  Location: left shoulder      Objective    DOS: 3/12/24  POW:  5 months    MMT L shoulder: grossly 4/5        Treatment     Marcos received the treatments listed below:      therapeutic exercises to develop strength, endurance, and ROM for 00 minutes including:    manual therapy techniques: Joint mobilizations and Soft tissue Mobilization were applied to the: L shoulder for 00 minutes, including:    neuromuscular re-education  activities to improve: Balance, Coordination, and Proprioception for 20 minutes. The following activities were included:  Standing I's OTB 4x5   ABCs 2x fatigue       therapeutic activities to improve functional performance for 40 minutes, including:  UBE 4/4  Overhead press 3# 4x5  Pushups at mat 3x10   Lat pulldowns - 10# x20   Flexion, abduction, D1 flexion 2# x12   OH press abduction, D1 flexion 2x12 3#     Patient Education and Home Exercises       Education provided:   - POC  - HEP modifications    Written Home Exercises Provided: Patient instructed to cont prior HEP. Exercises were reviewed and Marcos was able to demonstrate them prior to the end of the session.  Marcos demonstrated good  understanding of the education provided. See Electronic Medical Record under Patient Instructions for exercises provided during therapy sessions    Assessment     Patient completed session as noted above. Focused on functional activities mainly targeting overhead lifting. Will continue to progress as tolerated.        Marcos Is progressing well towards his goals.   Patient prognosis is Good.     Patient will continue to benefit from skilled outpatient physical therapy to address the deficits listed in the problem list box on initial evaluation, provide pt/family education and to maximize pt's level of independence in the home and community environment.     Patient's spiritual, cultural and educational needs considered and pt agreeable to plan of care and goals.     Anticipated barriers to physical therapy: advanced age    Goals:   Post OP shoulder Goals  Short Term Goals (4 Weeks):   1. Pt will be independent with HEP to supplement PT in improving functional use of R UE. MET  2. Pt will increase pain free left shoulder elevation PROM to >/= 160 deg to improve functional mobility of UE MET  3. Pt will increase L shoulder ER PROM in 90 deg abduction to >/=20 deg to improve functional mobility of UE MET  4. Pt will increase L  elbow AAROM to WNL deg in 4 weeks to improve functional mobility of UE. MET     Long Term Goals (24 Weeks):  progressing all   1. Pt will improve FOTO score to </=30% disability limited to decrease perceived limitation with L shoulder carrying, moving, and handling objects.  2. Pt will increase L shoulder PROM to WNL in all planes to improve functional use of R RUE.  3. Pt will increase L shoulder AROM to WFL in all planes to improve functional use of R RUE.  4. Pt will improve L shoulder MMTs in areas of limitation to promote equal use of B UEs in performing functional tasks.  5. Pt will lift 10 lb objects without pain to promote functional QOL.    Plan     Plan of care Certification: 3/22/2024 to 9/30/24.     Outpatient Physical Therapy 1-2 times weekly for 24 weeks to include the following interventions: Gait Training, Manual Therapy, Neuromuscular Re-ed, Therapeutic Activities, and Therapeutic Exercise.     Wayne Sandoval, PT

## 2024-09-09 ENCOUNTER — CLINICAL SUPPORT (OUTPATIENT)
Dept: REHABILITATION | Facility: HOSPITAL | Age: 74
End: 2024-09-09
Payer: MEDICARE

## 2024-09-09 DIAGNOSIS — M25.512 ACUTE PAIN OF LEFT SHOULDER: Primary | ICD-10-CM

## 2024-09-09 PROCEDURE — 97530 THERAPEUTIC ACTIVITIES: CPT

## 2024-09-09 NOTE — PROGRESS NOTES
[facility]   Physical Therapy Treatment Note      Name: Marcos Barfield  Clinic Number: [id number]    Therapy Diagnosis:        Encounter Diagnosis   Name Primary?    Non-traumatic rotator cuff tear, left       Physician: Nhan Grullon III, *    Physician Orders: PT Eval and Treat  Medical Diagnosis from Referral:   M75.102 (ICD-10-CM) - Non-traumatic rotator cuff tear, left      Evaluation Date: 3/22/2024  Authorization Period Expiration: 12/31/2024  Plan of Care Expiration: 9/30/24  Progress Note Due: Performed 7/17/24  Date of Surgery: 3/12/24  Visit # / Visits authorized: 37/50  FOTO: 1/ 3     Precautions:   POSTOPERATIVE PLAN: We will follow the arthroscopic rotator cuff repair guidelines for a small size rotator cuff tear.  We discussed with the patient's family after surgery.  The patient will remain in a sling for 6 weeks.  PT to start at 1-2 weeks.     Cuff specific program:  Pendulum exercises and Codman's exercises in 5-7 days, protecting rotator cuff repair for 1 week by avoiding active motion program until 1 week.     PASSIVE ROM: ER side 30 degrees, Forward Flex 90 degrees, ABD - 60 degrees   Full AAROM/PROM starting at 5-7 days as tolerated       Time In: 10:00 am  Time Out: 11:00 am  Total Billable Time: 15 minutes one on one   Visit Date: 9/9/2024    Subjective     Patient reports: soreness has subsided   He was compliant with home exercise program.  Response to previous treatment: Did well after evaluation  Functional change: ongoing    Pain: 2/10  Location: left shoulder      Objective    DOS: 3/12/24  POW:  5 months    MMT L shoulder: grossly 4/5        Treatment     Marcos received the treatments listed below:      therapeutic exercises to develop strength, endurance, and ROM for 00 minutes including:    manual therapy techniques: Joint mobilizations and Soft tissue Mobilization were applied to the: L shoulder for 00 minutes, including:    neuromuscular re-education  activities to improve: Balance, Coordination, and Proprioception for 20 minutes. The following activities were included:  Standing I's OTB 4x5   ABCs 2x fatigue       therapeutic activities to improve functional performance for 40 minutes, including:  UBE 4/4  Overhead press 3# 4x5  Pushups at mat 3x10   Lat pulldowns - 10# x20   Flexion, abduction, D1 flexion 2# x12   OH press abduction, D1 flexion 2x12 3#     Patient Education and Home Exercises       Education provided:   - POC  - HEP modifications    Written Home Exercises Provided: Patient instructed to cont prior HEP. Exercises were reviewed and Marcos was able to demonstrate them prior to the end of the session.  Marcos demonstrated good  understanding of the education provided. See Electronic Medical Record under Patient Instructions for exercises provided during therapy sessions    Assessment     Patient completed session as noted above. Focused on functional activities mainly targeting overhead lifting. Will continue to progress as tolerated.        Marcos Is progressing well towards his goals.   Patient prognosis is Good.     Patient will continue to benefit from skilled outpatient physical therapy to address the deficits listed in the problem list box on initial evaluation, provide pt/family education and to maximize pt's level of independence in the home and community environment.     Patient's spiritual, cultural and educational needs considered and pt agreeable to plan of care and goals.     Anticipated barriers to physical therapy: advanced age    Goals:   Post OP shoulder Goals  Short Term Goals (4 Weeks):   1. Pt will be independent with HEP to supplement PT in improving functional use of R UE. MET  2. Pt will increase pain free left shoulder elevation PROM to >/= 160 deg to improve functional mobility of UE MET  3. Pt will increase L shoulder ER PROM in 90 deg abduction to >/=20 deg to improve functional mobility of UE MET  4. Pt will increase L  elbow AAROM to WNL deg in 4 weeks to improve functional mobility of UE. MET     Long Term Goals (24 Weeks):  progressing all   1. Pt will improve FOTO score to </=30% disability limited to decrease perceived limitation with L shoulder carrying, moving, and handling objects.  2. Pt will increase L shoulder PROM to WNL in all planes to improve functional use of R RUE.  3. Pt will increase L shoulder AROM to WFL in all planes to improve functional use of R RUE.  4. Pt will improve L shoulder MMTs in areas of limitation to promote equal use of B UEs in performing functional tasks.  5. Pt will lift 10 lb objects without pain to promote functional QOL.    Plan     Plan of care Certification: 3/22/2024 to 9/30/24.     Outpatient Physical Therapy 1-2 times weekly for 24 weeks to include the following interventions: Gait Training, Manual Therapy, Neuromuscular Re-ed, Therapeutic Activities, and Therapeutic Exercise.     Wayne Sandoval, PT

## 2024-09-25 ENCOUNTER — CLINICAL SUPPORT (OUTPATIENT)
Dept: REHABILITATION | Facility: HOSPITAL | Age: 74
End: 2024-09-25
Payer: MEDICARE

## 2024-09-25 DIAGNOSIS — M25.512 ACUTE PAIN OF LEFT SHOULDER: Primary | ICD-10-CM

## 2024-09-25 PROCEDURE — 97530 THERAPEUTIC ACTIVITIES: CPT

## 2024-09-25 NOTE — PROGRESS NOTES
[facility]   Physical Therapy Treatment Note      Name: Marcos Barfield  Clinic Number: [id number]    Therapy Diagnosis:        Encounter Diagnosis   Name Primary?    Non-traumatic rotator cuff tear, left       Physician: Nhan Grullon III, *    Physician Orders: PT Eval and Treat  Medical Diagnosis from Referral:   M75.102 (ICD-10-CM) - Non-traumatic rotator cuff tear, left      Evaluation Date: 3/22/2024  Authorization Period Expiration: 12/31/2024  Plan of Care Expiration: 9/30/24  Progress Note Due: Performed 7/17/24  Date of Surgery: 3/12/24  Visit # / Visits authorized: 37/50  FOTO: 1/ 3     Precautions:   POSTOPERATIVE PLAN: We will follow the arthroscopic rotator cuff repair guidelines for a small size rotator cuff tear.  We discussed with the patient's family after surgery.  The patient will remain in a sling for 6 weeks.  PT to start at 1-2 weeks.     Cuff specific program:  Pendulum exercises and Codman's exercises in 5-7 days, protecting rotator cuff repair for 1 week by avoiding active motion program until 1 week.     PASSIVE ROM: ER side 30 degrees, Forward Flex 90 degrees, ABD - 60 degrees   Full AAROM/PROM starting at 5-7 days as tolerated       Time In: 10:00 am  Time Out: 11:00 am  Total Billable Time: 30 minutes one on one   Visit Date: 9/25/2024    Subjective     Patient reports: soreness has subsided   He was compliant with home exercise program.  Response to previous treatment: Did well after evaluation  Functional change: ongoing    Pain: 2/10  Location: left shoulder      Objective    DOS: 3/12/24  POW:  7 months      Passive Range of Motion:   Shoulder Right Left   Flexion 165 160   ER at 90 90 85   IR 70 60      Active Range of Motion:   Shoulder Right Left   Flexion 150 150     Strength:  Shoulder Right Left   Flexion 4/5 4+/5   Abduction 4/5 4+/5   ER 4+/5 4+/5   IR 5/5 5/5         Treatment     Marcos received the treatments listed below:      therapeutic  exercises to develop strength, endurance, and ROM for 10 minutes including:  Assessment as above     manual therapy techniques: Joint mobilizations and Soft tissue Mobilization were applied to the: L shoulder for 00 minutes, including:    neuromuscular re-education activities to improve: Balance, Coordination, and Proprioception for 20 minutes. The following activities were included:  Standing I's OTB 4x5   ABCs 2x fatigue       therapeutic activities to improve functional performance for 40 minutes, including:  UBE 4/4  Overhead press 3# 4x5  Pushups at mat 3x10   Lat pulldowns - 10# x20   Flexion, abduction, D1 flexion 2# x12   OH press abduction, D1 flexion 2x12 3#     Patient Education and Home Exercises       Education provided:   - POC  - HEP modifications    Written Home Exercises Provided: Patient instructed to cont prior HEP. Exercises were reviewed and Marcos was able to demonstrate them prior to the end of the session.  Marcos demonstrated good  understanding of the education provided. See Electronic Medical Record under Patient Instructions for exercises provided during therapy sessions    Assessment     Patient completed session as noted above. Per reassessment, he shows excellent ROM near the contralateral side. Strength is still about 1-1/2 behind contralateral side, but this should cont to improve with time. He currently does not have a f/u scheduled with Dr. Sherman and educated to reach out to office to schedule this.         Marcos Is progressing well towards his goals.   Patient prognosis is Good.     Patient will continue to benefit from skilled outpatient physical therapy to address the deficits listed in the problem list box on initial evaluation, provide pt/family education and to maximize pt's level of independence in the home and community environment.     Patient's spiritual, cultural and educational needs considered and pt agreeable to plan of care and goals.     Anticipated barriers to  physical therapy: advanced age    Goals:   Post OP shoulder Goals  Short Term Goals (4 Weeks):   1. Pt will be independent with HEP to supplement PT in improving functional use of R UE. MET  2. Pt will increase pain free left shoulder elevation PROM to >/= 160 deg to improve functional mobility of UE MET  3. Pt will increase L shoulder ER PROM in 90 deg abduction to >/=20 deg to improve functional mobility of UE MET  4. Pt will increase L elbow AAROM to WNL deg in 4 weeks to improve functional mobility of UE. MET     Long Term Goals (24 Weeks):  progressing all   1. Pt will improve FOTO score to </=30% disability limited to decrease perceived limitation with L shoulder carrying, moving, and handling objects. MET  2. Pt will increase L shoulder PROM to WNL in all planes to improve functional use of R RUE. MET  3. Pt will increase L shoulder AROM to WFL in all planes to improve functional use of R RUE. MET  4. Pt will improve L shoulder MMTs in areas of limitation to promote equal use of B UEs in performing functional tasks. Progressing   5. Pt will lift 10 lb objects without pain to promote functional QOL. Progressing     Plan     Plan of care Certification: 3/22/2024 to 9/30/24.     Outpatient Physical Therapy 1-2 times weekly for 24 weeks to include the following interventions: Gait Training, Manual Therapy, Neuromuscular Re-ed, Therapeutic Activities, and Therapeutic Exercise.     Wayne Sandoval, PT

## 2024-09-27 ENCOUNTER — TELEPHONE (OUTPATIENT)
Dept: SPORTS MEDICINE | Facility: CLINIC | Age: 74
End: 2024-09-27
Payer: MEDICARE

## 2024-09-27 NOTE — TELEPHONE ENCOUNTER
----- Message from Alana Metcalf MA sent at 9/27/2024 10:24 AM CDT -----  Looks like he already has a recall set up for his next post-op. Just [facility] PT wanting to know.  ----- Message -----  From: Lisbet Aguilera  Sent: 9/27/2024  10:21 AM CDT  To: [facility] Staff         Nature of Call: requesting to be advised post-op f/u    Best Call Back Number: [phone]     Additional Information:  Marcos Barfield calling regarding Patient Advice for wanting to know when will he be set up for the 6 month f/u, stating the physical therapist is needing to know for plan of care, please call tp inform PT   Yes

## 2024-10-02 ENCOUNTER — CLINICAL SUPPORT (OUTPATIENT)
Dept: REHABILITATION | Facility: HOSPITAL | Age: 74
End: 2024-10-02
Payer: MEDICARE

## 2024-10-02 DIAGNOSIS — M25.512 ACUTE PAIN OF LEFT SHOULDER: Primary | ICD-10-CM

## 2024-10-02 PROCEDURE — 97530 THERAPEUTIC ACTIVITIES: CPT

## 2024-10-02 NOTE — PROGRESS NOTES
[facility]   Physical Therapy Treatment Note      Name: Marcos Barfield  Clinic Number: [id number]    Therapy Diagnosis:        Encounter Diagnosis   Name Primary?    Non-traumatic rotator cuff tear, left       Physician: Nhan Grullon III, *    Physician Orders: PT Eval and Treat  Medical Diagnosis from Referral:   M75.102 (ICD-10-CM) - Non-traumatic rotator cuff tear, left      Evaluation Date: 3/22/2024  Authorization Period Expiration: 12/31/2024  Plan of Care Expiration: 9/30/24  Progress Note Due: Performed 7/17/24  Date of Surgery: 3/12/24  Visit # / Visits authorized: 39/50  FOTO: 1/ 3     Precautions:   POSTOPERATIVE PLAN: We will follow the arthroscopic rotator cuff repair guidelines for a small size rotator cuff tear.  We discussed with the patient's family after surgery.  The patient will remain in a sling for 6 weeks.  PT to start at 1-2 weeks.     Cuff specific program:  Pendulum exercises and Codman's exercises in 5-7 days, protecting rotator cuff repair for 1 week by avoiding active motion program until 1 week.     PASSIVE ROM: ER side 30 degrees, Forward Flex 90 degrees, ABD - 60 degrees   Full AAROM/PROM starting at 5-7 days as tolerated       Time In: 10:00 am  Time Out: 11:00 am  Total Billable Time: 15 minutes one on one   Visit Date: 10/2/2024    Subjective     Patient reports: soreness has subsided   He was compliant with home exercise program.  Response to previous treatment: Did well after evaluation  Functional change: ongoing    Pain: 2/10  Location: left shoulder      Objective    DOS: 3/12/24  POW:  7 months      Passive Range of Motion:   Shoulder Right Left   Flexion 165 160   ER at 90 90 85   IR 70 60      Active Range of Motion:   Shoulder Right Left   Flexion 150 150     Strength:  Shoulder Right Left   Flexion 4/5 4+/5   Abduction 4/5 4+/5   ER 4+/5 4+/5   IR 5/5 5/5         Treatment     Marcos received the treatments listed below:      therapeutic  exercises to develop strength, endurance, and ROM for 00 minutes including:    manual therapy techniques: Joint mobilizations and Soft tissue Mobilization were applied to the: L shoulder for 10 minutes, including:  Anterior/inf mobs - Grade 4   Inf mobs in full elevation Grade 5     neuromuscular re-education activities to improve: Balance, Coordination, and Proprioception for 15 minutes. The following activities were included:  [place] OTB 3x5   Wall slides stretch flex/abd - x20 each       therapeutic activities to improve functional performance for 45 minutes, including:  UBE 4/4  Med ball press overhead 3x10   Bent over rows 80# 3x10   D1/D2 extension 3x10 10#   OH carries 4# 4 laps       Patient Education and Home Exercises       Education provided:   - POC  - HEP modifications    Written Home Exercises Provided: Patient instructed to cont prior HEP. Exercises were reviewed and Marcos was able to demonstrate them prior to the end of the session.  Marcos demonstrated good  understanding of the education provided. See Electronic Medical Record under Patient Instructions for exercises provided during therapy sessions    Assessment     Patient completed session as noted above. Per reassessment, he shows excellent ROM near the contralateral side. Strength is still about 1-1/2 behind contralateral side, but this should cont to improve with time. He currently does not have a f/u scheduled with Dr. Sherman and educated to reach out to office to schedule this.         Marcos Is progressing well towards his goals.   Patient prognosis is Good.     Patient will continue to benefit from skilled outpatient physical therapy to address the deficits listed in the problem list box on initial evaluation, provide pt/family education and to maximize pt's level of independence in the home and community environment.     Patient's spiritual, cultural and educational needs considered and pt agreeable to plan of care and goals.     Anticipated  barriers to physical therapy: advanced age    Goals:   Post OP shoulder Goals  Short Term Goals (4 Weeks):   1. Pt will be independent with HEP to supplement PT in improving functional use of R UE. MET  2. Pt will increase pain free left shoulder elevation PROM to >/= 160 deg to improve functional mobility of UE MET  3. Pt will increase L shoulder ER PROM in 90 deg abduction to >/=20 deg to improve functional mobility of UE MET  4. Pt will increase L elbow AAROM to WNL deg in 4 weeks to improve functional mobility of UE. MET     Long Term Goals (24 Weeks):  progressing all   1. Pt will improve FOTO score to </=30% disability limited to decrease perceived limitation with L shoulder carrying, moving, and handling objects. MET  2. Pt will increase L shoulder PROM to WNL in all planes to improve functional use of R RUE. MET  3. Pt will increase L shoulder AROM to WFL in all planes to improve functional use of R RUE. MET  4. Pt will improve L shoulder MMTs in areas of limitation to promote equal use of B UEs in performing functional tasks. Progressing   5. Pt will lift 10 lb objects without pain to promote functional QOL. Progressing     Plan     Plan of care Certification: 3/22/2024 to 9/30/24.     Outpatient Physical Therapy 1-2 times weekly for 24 weeks to include the following interventions: Gait Training, Manual Therapy, Neuromuscular Re-ed, Therapeutic Activities, and Therapeutic Exercise.     Wayne Sandoval, PT

## 2024-10-09 ENCOUNTER — CLINICAL SUPPORT (OUTPATIENT)
Dept: REHABILITATION | Facility: HOSPITAL | Age: 74
End: 2024-10-09
Payer: MEDICARE

## 2024-10-09 DIAGNOSIS — M25.512 ACUTE PAIN OF LEFT SHOULDER: Primary | ICD-10-CM

## 2024-10-09 PROCEDURE — 97530 THERAPEUTIC ACTIVITIES: CPT

## 2024-10-09 NOTE — PROGRESS NOTES
[facility]   Physical Therapy Treatment Note      Name: Marcos Barfield  Clinic Number: [id number]    Therapy Diagnosis:        Encounter Diagnosis   Name Primary?    Non-traumatic rotator cuff tear, left       Physician: Nhan Grullon III, *    Physician Orders: PT Eval and Treat  Medical Diagnosis from Referral:   M75.102 (ICD-10-CM) - Non-traumatic rotator cuff tear, left      Evaluation Date: 3/22/2024  Authorization Period Expiration: 12/31/2024  Plan of Care Expiration: 9/30/24  Progress Note Due: Performed 7/17/24  Date of Surgery: 3/12/24  Visit # / Visits authorized: 39/50  FOTO: 1/ 3     Precautions:   POSTOPERATIVE PLAN: We will follow the arthroscopic rotator cuff repair guidelines for a small size rotator cuff tear.  We discussed with the patient's family after surgery.  The patient will remain in a sling for 6 weeks.  PT to start at 1-2 weeks.     Cuff specific program:  Pendulum exercises and Codman's exercises in 5-7 days, protecting rotator cuff repair for 1 week by avoiding active motion program until 1 week.     PASSIVE ROM: ER side 30 degrees, Forward Flex 90 degrees, ABD - 60 degrees   Full AAROM/PROM starting at 5-7 days as tolerated       Time In: 10:00 am  Time Out: 11:00 am  Total Billable Time: 30 minutes one on one   Visit Date: 10/9/2024    Subjective     Patient reports: soreness has subsided   He was compliant with home exercise program.  Response to previous treatment: Did well after evaluation  Functional change: ongoing    Pain: 2/10  Location: left shoulder      Objective    DOS: 3/12/24  POW:  7 months      Passive Range of Motion:   Shoulder Right Left   Flexion 165 160   ER at 90 90 85   IR 70 60      Active Range of Motion:   Shoulder Right Left   Flexion 150 150     Strength:  Shoulder Right Left   Flexion 4/5 4+/5   Abduction 4/5 4+/5   ER 4+/5 4+/5   IR 5/5 5/5         Treatment     Marcos received the treatments listed below:      therapeutic  exercises to develop strength, endurance, and ROM for 15 minutes including:  Lat stretch with dowel - 20x5s   Behind back stretch with orange strap 20x10s     manual therapy techniques: Joint mobilizations and Soft tissue Mobilization were applied to the: L shoulder for 00 minutes, including:  Anterior/inf mobs - Grade 4   Inf mobs in full elevation Grade 5     neuromuscular re-education activities to improve: Balance, Coordination, and Proprioception for 15 minutes. The following activities were included:  [place] OTB 3x5   Standing ER OTB at 90 degrees 3x12       therapeutic activities to improve functional performance for 30 minutes, including:  UBE 4/4  Med ball press overhead 3x10   Bent over rows 80# 3x10   D1/D2 extension 3x10 10#   OH carries 4# 4 laps       Patient Education and Home Exercises       Education provided:   - POC  - HEP modifications    Written Home Exercises Provided: Patient instructed to cont prior HEP. Exercises were reviewed and Marcos was able to demonstrate them prior to the end of the session.  Marcos demonstrated good  understanding of the education provided. See Electronic Medical Record under Patient Instructions for exercises provided during therapy sessions    Assessment     Patient completed session as noted above. OH strength is improving but still needs strengthening. Will cont to progress as van.          Marcos Is progressing well towards his goals.   Patient prognosis is Good.     Patient will continue to benefit from skilled outpatient physical therapy to address the deficits listed in the problem list box on initial evaluation, provide pt/family education and to maximize pt's level of independence in the home and community environment.     Patient's spiritual, cultural and educational needs considered and pt agreeable to plan of care and goals.     Anticipated barriers to physical therapy: advanced age    Goals:   Post OP shoulder Goals  Short Term Goals (4 Weeks):   1. Pt  will be independent with HEP to supplement PT in improving functional use of R UE. MET  2. Pt will increase pain free left shoulder elevation PROM to >/= 160 deg to improve functional mobility of UE MET  3. Pt will increase L shoulder ER PROM in 90 deg abduction to >/=20 deg to improve functional mobility of UE MET  4. Pt will increase L elbow AAROM to WNL deg in 4 weeks to improve functional mobility of UE. MET     Long Term Goals (24 Weeks):  progressing all   1. Pt will improve FOTO score to </=30% disability limited to decrease perceived limitation with L shoulder carrying, moving, and handling objects. MET  2. Pt will increase L shoulder PROM to WNL in all planes to improve functional use of R RUE. MET  3. Pt will increase L shoulder AROM to WFL in all planes to improve functional use of R RUE. MET  4. Pt will improve L shoulder MMTs in areas of limitation to promote equal use of B UEs in performing functional tasks. Progressing   5. Pt will lift 10 lb objects without pain to promote functional QOL. Progressing     Plan     Plan of care Certification: 3/22/2024 to 9/30/24.     Outpatient Physical Therapy 1-2 times weekly for 24 weeks to include the following interventions: Gait Training, Manual Therapy, Neuromuscular Re-ed, Therapeutic Activities, and Therapeutic Exercise.     Wayne Sandoval, PT

## 2024-10-14 ENCOUNTER — CLINICAL SUPPORT (OUTPATIENT)
Dept: REHABILITATION | Facility: HOSPITAL | Age: 74
End: 2024-10-14
Payer: MEDICARE

## 2024-10-14 DIAGNOSIS — M25.512 ACUTE PAIN OF LEFT SHOULDER: Primary | ICD-10-CM

## 2024-10-14 PROCEDURE — 97530 THERAPEUTIC ACTIVITIES: CPT

## 2024-10-14 NOTE — PROGRESS NOTES
[facility] [place] [facility]   Physical Therapy Treatment Note      Name: Marcos Barfield  Clinic Number: [id number]    Therapy Diagnosis:        Encounter Diagnosis   Name Primary?    Non-traumatic rotator cuff tear, left       Physician: Nhan Grullon III, *    Physician Orders: PT Eval and Treat  Medical Diagnosis from Referral:   M75.102 (ICD-10-CM) - Non-traumatic rotator cuff tear, left      Evaluation Date: 3/22/2024  Authorization Period Expiration: 12/31/2024  Plan of Care Expiration: 9/30/24  Progress Note Due: Performed 7/17/24  Date of Surgery: 3/12/24  Visit # / Visits authorized: 39/50  FOTO: 1/ 3     Precautions:   POSTOPERATIVE PLAN: We will follow the arthroscopic rotator cuff repair guidelines for a small size rotator cuff tear.  We discussed with the patient's family after surgery.  The patient will remain in a sling for 6 weeks.  PT to start at 1-2 weeks.     Cuff specific program:  Pendulum exercises and Codman's exercises in 5-7 days, protecting rotator cuff repair for 1 week by avoiding active motion program until 1 week.     PASSIVE ROM: ER side 30 degrees, Forward Flex 90 degrees, ABD - 60 degrees   Full AAROM/PROM starting at 5-7 days as tolerated       Time In: 10:00 am  Time Out: 11:00 am  Total Billable Time: 30 minutes one on one   Visit Date: 10/14/2024    Subjective     Patient reports: soreness has subsided   He was compliant with home exercise program.  Response to previous treatment: Did well after evaluation  Functional change: ongoing    Pain: 2/10  Location: left shoulder      Objective    DOS: 3/12/24  POW:  7 months      Passive Range of Motion:   Shoulder Right Left   Flexion 165 160   ER at 90 90 85   IR 70 60      Active Range of Motion:   Shoulder Right Left   Flexion 150 150     Strength:  Shoulder Right Left   Flexion 4/5 4+/5   Abduction 4/5 4+/5   ER 4+/5 4+/5   IR 5/5 5/5         Treatment     Marcos received the treatments listed below:      therapeutic  exercises to develop strength, endurance, and ROM for 15 minutes including:  Lat stretch with dowel - 20x5s   Behind back stretch with orange strap 20x10s     manual therapy techniques: Joint mobilizations and Soft tissue Mobilization were applied to the: L shoulder for 00 minutes, including:  Anterior/inf mobs - Grade 4   Inf mobs in full elevation Grade 5     neuromuscular re-education activities to improve: Balance, Coordination, and Proprioception for 15 minutes. The following activities were included:  Standing ER OTB at 90 degrees 3x12   ABCs on wall x2   Y's OTB 3x10       therapeutic activities to improve functional performance for 30 minutes, including:  UBE 4/4  Med ball press overhead 3x10   Bent over rows 8# 3x10   D1/D2 extension 3x10 10#   OH carries 4# 4 laps       Patient Education and Home Exercises       Education provided:   - POC  - HEP modifications    Written Home Exercises Provided: Patient instructed to cont prior HEP. Exercises were reviewed and Marcos was able to demonstrate them prior to the end of the session.  Marcos demonstrated good  understanding of the education provided. See Electronic Medical Record under Patient Instructions for exercises provided during therapy sessions    Assessment     Patient completed session as noted above. OH strength is improving but still needs strengthening. Will cont to progress as van.          Marcos Is progressing well towards his goals.   Patient prognosis is Good.     Patient will continue to benefit from skilled outpatient physical therapy to address the deficits listed in the problem list box on initial evaluation, provide pt/family education and to maximize pt's level of independence in the home and community environment.     Patient's spiritual, cultural and educational needs considered and pt agreeable to plan of care and goals.     Anticipated barriers to physical therapy: advanced age    Goals:   Post OP shoulder Goals  Short Term Goals (4  Weeks):   1. Pt will be independent with HEP to supplement PT in improving functional use of R UE. MET  2. Pt will increase pain free left shoulder elevation PROM to >/= 160 deg to improve functional mobility of UE MET  3. Pt will increase L shoulder ER PROM in 90 deg abduction to >/=20 deg to improve functional mobility of UE MET  4. Pt will increase L elbow AAROM to WNL deg in 4 weeks to improve functional mobility of UE. MET     Long Term Goals (24 Weeks):  progressing all   1. Pt will improve FOTO score to </=30% disability limited to decrease perceived limitation with L shoulder carrying, moving, and handling objects. MET  2. Pt will increase L shoulder PROM to WNL in all planes to improve functional use of R RUE. MET  3. Pt will increase L shoulder AROM to WFL in all planes to improve functional use of R RUE. MET  4. Pt will improve L shoulder MMTs in areas of limitation to promote equal use of B UEs in performing functional tasks. Progressing   5. Pt will lift 10 lb objects without pain to promote functional QOL. Progressing     Plan     Plan of care Certification: 3/22/2024 to 9/30/24.     Outpatient Physical Therapy 1-2 times weekly for 24 weeks to include the following interventions: Gait Training, Manual Therapy, Neuromuscular Re-ed, Therapeutic Activities, and Therapeutic Exercise.     Wayne Sandoval, PT

## 2024-10-21 ENCOUNTER — CLINICAL SUPPORT (OUTPATIENT)
Dept: REHABILITATION | Facility: HOSPITAL | Age: 74
End: 2024-10-21
Payer: MEDICARE

## 2024-10-21 DIAGNOSIS — M25.512 ACUTE PAIN OF LEFT SHOULDER: Primary | ICD-10-CM

## 2024-10-21 PROCEDURE — 97530 THERAPEUTIC ACTIVITIES: CPT

## 2024-10-22 NOTE — PROGRESS NOTES
[facility]   Physical Therapy Treatment Note      Name: Mracos Barfield  Clinic Number: [id number]    Therapy Diagnosis:        Encounter Diagnosis   Name Primary?    Non-traumatic rotator cuff tear, left       Physician: Nhan Grullon III, *    Physician Orders: PT Eval and Treat  Medical Diagnosis from Referral:   M75.102 (ICD-10-CM) - Non-traumatic rotator cuff tear, left      Evaluation Date: 3/22/2024  Authorization Period Expiration: 12/31/2024  Plan of Care Expiration: 9/30/24  Progress Note Due: Performed 7/17/24  Date of Surgery: 3/12/24  Visit # / Visits authorized: 39/50  FOTO: 1/ 3     Precautions:   POSTOPERATIVE PLAN: We will follow the arthroscopic rotator cuff repair guidelines for a small size rotator cuff tear.  We discussed with the patient's family after surgery.  The patient will remain in a sling for 6 weeks.  PT to start at 1-2 weeks.     Cuff specific program:  Pendulum exercises and Codman's exercises in 5-7 days, protecting rotator cuff repair for 1 week by avoiding active motion program until 1 week.     PASSIVE ROM: ER side 30 degrees, Forward Flex 90 degrees, ABD - 60 degrees   Full AAROM/PROM starting at 5-7 days as tolerated       Time In: 10:00 am  Time Out: 11:00 am  Total Billable Time: 30 minutes one on one   Visit Date: 10/21/2024    Subjective     Patient reports: soreness has subsided   He was compliant with home exercise program.  Response to previous treatment: Did well after evaluation  Functional change: ongoing    Pain: 2/10  Location: left shoulder      Objective    DOS: 3/12/24  POW:  7 months      Passive Range of Motion:   Shoulder Right Left   Flexion 165 160   ER at 90 90 85   IR 70 60      Active Range of Motion:   Shoulder Right Left   Flexion 150 150     Strength:  Shoulder Right Left   Flexion 4/5 4+/5   Abduction 4/5 4+/5   ER 4+/5 4+/5   IR 5/5 5/5         Treatment     Marcos received the treatments listed below:      therapeutic  exercises to develop strength, endurance, and ROM for 15 minutes including:  Lat stretch with dowel - 20x5s   Behind back stretch with orange strap 20x10s     manual therapy techniques: Joint mobilizations and Soft tissue Mobilization were applied to the: L shoulder for 00 minutes, including:  Anterior/inf mobs - Grade 4   Inf mobs in full elevation Grade 5     neuromuscular re-education activities to improve: Balance, Coordination, and Proprioception for 15 minutes. The following activities were included:  Standing ER OTB at 90 degrees 3x12   ABCs on wall x2   Y's OTB 3x10       therapeutic activities to improve functional performance for 30 minutes, including:  UBE 4/4  Med ball press overhead 3x10   Bent over rows 8# 3x10   D1/D2 extension 3x10 10#   OH carries 4# 4 laps       Patient Education and Home Exercises       Education provided:   - POC  - HEP modifications    Written Home Exercises Provided: Patient instructed to cont prior HEP. Exercises were reviewed and Marcos was able to demonstrate them prior to the end of the session.  Marcos demonstrated good  understanding of the education provided. See Electronic Medical Record under Patient Instructions for exercises provided during therapy sessions    Assessment     Patient completed session as noted above. OH strength is improving but still needs strengthening. Will cont to progress as van.          Marcos Is progressing well towards his goals.   Patient prognosis is Good.     Patient will continue to benefit from skilled outpatient physical therapy to address the deficits listed in the problem list box on initial evaluation, provide pt/family education and to maximize pt's level of independence in the home and community environment.     Patient's spiritual, cultural and educational needs considered and pt agreeable to plan of care and goals.     Anticipated barriers to physical therapy: advanced age    Goals:   Post OP shoulder Goals  Short Term Goals (4  Weeks):   1. Pt will be independent with HEP to supplement PT in improving functional use of R UE. MET  2. Pt will increase pain free left shoulder elevation PROM to >/= 160 deg to improve functional mobility of UE MET  3. Pt will increase L shoulder ER PROM in 90 deg abduction to >/=20 deg to improve functional mobility of UE MET  4. Pt will increase L elbow AAROM to WNL deg in 4 weeks to improve functional mobility of UE. MET     Long Term Goals (24 Weeks):  progressing all   1. Pt will improve FOTO score to </=30% disability limited to decrease perceived limitation with L shoulder carrying, moving, and handling objects. MET  2. Pt will increase L shoulder PROM to WNL in all planes to improve functional use of R RUE. MET  3. Pt will increase L shoulder AROM to WFL in all planes to improve functional use of R RUE. MET  4. Pt will improve L shoulder MMTs in areas of limitation to promote equal use of B UEs in performing functional tasks. Progressing   5. Pt will lift 10 lb objects without pain to promote functional QOL. Progressing     Plan     Plan of care Certification: 3/22/2024 to 9/30/24.     Outpatient Physical Therapy 1-2 times weekly for 24 weeks to include the following interventions: Gait Training, Manual Therapy, Neuromuscular Re-ed, Therapeutic Activities, and Therapeutic Exercise.     Wayne Sandoval, PT

## 2024-10-30 ENCOUNTER — CLINICAL SUPPORT (OUTPATIENT)
Dept: REHABILITATION | Facility: HOSPITAL | Age: 74
End: 2024-10-30
Payer: MEDICARE

## 2024-10-30 DIAGNOSIS — M25.512 ACUTE PAIN OF LEFT SHOULDER: Primary | ICD-10-CM

## 2024-10-30 PROCEDURE — 97530 THERAPEUTIC ACTIVITIES: CPT

## 2024-11-04 ENCOUNTER — OFFICE VISIT (OUTPATIENT)
Dept: SPORTS MEDICINE | Facility: CLINIC | Age: 74
End: 2024-11-04
Payer: MEDICARE

## 2024-11-04 VITALS
BODY MASS INDEX: 23.02 KG/M2 | DIASTOLIC BLOOD PRESSURE: 82 MMHG | SYSTOLIC BLOOD PRESSURE: 147 MMHG | HEIGHT: 70 IN | HEART RATE: 70 BPM | WEIGHT: 160.81 LBS

## 2024-11-04 DIAGNOSIS — M25.512 LEFT SHOULDER PAIN, UNSPECIFIED CHRONICITY: ICD-10-CM

## 2024-11-04 DIAGNOSIS — Z98.890 S/P ROTATOR CUFF SURGERY: Primary | ICD-10-CM

## 2024-11-04 PROCEDURE — 99999 PR PBB SHADOW E&M-EST. PATIENT-LVL III: CPT | Mod: PBBFAC,,, | Performed by: ORTHOPAEDIC SURGERY

## 2024-11-04 PROCEDURE — 1159F MED LIST DOCD IN RCRD: CPT | Mod: CPTII,S$GLB,, | Performed by: ORTHOPAEDIC SURGERY

## 2024-11-04 PROCEDURE — 1101F PT FALLS ASSESS-DOCD LE1/YR: CPT | Mod: CPTII,S$GLB,, | Performed by: ORTHOPAEDIC SURGERY

## 2024-11-04 PROCEDURE — 3079F DIAST BP 80-89 MM HG: CPT | Mod: CPTII,S$GLB,, | Performed by: ORTHOPAEDIC SURGERY

## 2024-11-04 PROCEDURE — 99214 OFFICE O/P EST MOD 30 MIN: CPT | Mod: S$GLB,,, | Performed by: ORTHOPAEDIC SURGERY

## 2024-11-04 PROCEDURE — 3288F FALL RISK ASSESSMENT DOCD: CPT | Mod: CPTII,S$GLB,, | Performed by: ORTHOPAEDIC SURGERY

## 2024-11-04 PROCEDURE — 1125F AMNT PAIN NOTED PAIN PRSNT: CPT | Mod: CPTII,S$GLB,, | Performed by: ORTHOPAEDIC SURGERY

## 2024-11-04 PROCEDURE — 3077F SYST BP >= 140 MM HG: CPT | Mod: CPTII,S$GLB,, | Performed by: ORTHOPAEDIC SURGERY

## 2024-11-04 NOTE — PROGRESS NOTES
Chief Complaint: left shoulder pain    HISTORY OF PRESENT ILLNESS:   Pt is here today for follow up of shoulder arthroscopy.  he is doing well.  We have reviewed patient's findings and discussed plan of care and future treatment options.      Patient has been attending physical therapy at the [facility] location, working with Wayne DOWNEY. He notes performing his HEP daily     He notes some pain with sleeping and some soreness after his physical therapy, which is improved from his last visit on 8/5.   No other issues reported     Pain today 1/10  SANE post op: 90  SANE pre op: 60     DATE OF PROCEDURE: 03/12/2024  SURGEON: Kayleen Sherman M.D.  OPERATION:   left  1. Shoulder arthroscopic rotator cuff repair (CPT 85487) with CuffMend () ([id number] 40% partial thickness bursal)  2. Shoulder arthroscopic biceps tenodesis (CPT 22604)  3. Shoulder arthroscopic subacromial decompression, bursectomy   4. Shoulder arthroscopic extensive debridement (anterior, posterior glenohumeral joint, subacromial space) (CPT 03545)  5. Shoulder arthroscopic labral debridement (CPT 42545)  6. Shoulder arthroscopic lysis of adhesions (CPT 27305)    Partial thickness 40% cuff tearing on bursal side was debrided with shaver and gently with thermal device.     Review of Systems   Constitution: Negative. Negative for chills, fever and night sweats.   HENT: Negative for congestion and headaches.    Eyes: Negative for blurred vision, left vision loss and right vision loss.   Cardiovascular: Negative for chest pain and syncope.   Respiratory: Negative for cough and shortness of breath.    Endocrine: Negative for polydipsia, polyphagia and polyuria.   Hematologic/Lymphatic: Negative for bleeding problem. Does not bruise/bleed easily.   Skin: Negative for dry skin, itching and rash.   Musculoskeletal: Negative for falls and muscle weakness.   Gastrointestinal: Negative for abdominal pain and bowel incontinence.   Genitourinary: Negative for  bladder incontinence and nocturia.   Neurological: Negative for disturbances in coordination, loss of balance and seizures.   Psychiatric/Behavioral: Negative for depression. The patient does not have insomnia.    Allergic/Immunologic: Negative for hives and persistent infections.       PAST MEDICAL HISTORY:   Past Medical History:   Diagnosis Date    Anemia     kid     CKD (chronic kidney disease)     Diabetes mellitus     High cholesterol     Hypertension      PAST SURGICAL HISTORY:   Past Surgical History:   Procedure Laterality Date    ARTHROSCOPIC DEBRIDEMENT OF SHOULDER Right 8/20/2020    Procedure: DEBRIDEMENT, SHOULDER, ARTHROSCOPIC;  Surgeon: Kayleen Sherman MD;  Location: [facility] OR;  Service: Orthopedics;  Laterality: Right;    ARTHROSCOPIC REPAIR OF ROTATOR CUFF OF SHOULDER Right 8/20/2020    Procedure: REPAIR, ROTATOR CUFF, ARTHROSCOPIC;  Surgeon: Kayleen Sherman MD;  Location: [facility] OR;  Service: Orthopedics;  Laterality: Right;    ARTHROSCOPIC REPAIR OF ROTATOR CUFF OF SHOULDER Left 3/12/2024    Procedure: REPAIR, ROTATOR CUFF, ARTHROSCOPIC--with patch;  Surgeon: Kayleen Sherman MD;  Location: [facility] OR;  Service: Orthopedics;  Laterality: Left;    ARTHROSCOPY OF SHOULDER WITH DECOMPRESSION OF SUBACROMIAL SPACE Left 3/12/2024    Procedure: ARTHROSCOPY, SHOULDER, WITH SUBACROMIAL SPACE DECOMPRESSION;  Surgeon: Kayleen Sherman MD;  Location: [facility] OR;  Service: Orthopedics;  Laterality: Left;    ARTHROSCOPY OF SHOULDER WITH REMOVAL OF DISTAL CLAVICLE Right 8/20/2020    Procedure: ARTHROSCOPY, SHOULDER, WITH DISTAL CLAVICLE EXCISION;  Surgeon: Kayleen Sherman MD;  Location: [facility] OR;  Service: Orthopedics;  Laterality: Right;    COLONOSCOPY      x2    CYST REMOVAL      mid back    FIXATION OF TENDON Right 8/20/2020    Procedure: FIXATION, TENDON, Biceps Tenodesis;  Surgeon: Kayleen Sherman MD;  Location: [facility] OR;  Service: Orthopedics;  Laterality: Right;  FIXATION, TENDON, Biceps Tenodesis    FIXATION OF TENDON Left 3/12/2024     Procedure: FIXATION, TENDON--bicep tenodesis;  Surgeon: Kayleen Sherman MD;  Location: [facility] OR;  Service: Orthopedics;  Laterality: Left;    ORIF HUMERUS FRACTURE Right 8/20/2020    Procedure: ORIF, FRACTURE, HUMERUS;  Surgeon: Kayleen Sherman MD;  Location: [facility] OR;  Service: Orthopedics;  Laterality: Right;    SHOULDER SURGERY      VASECTOMY       FAMILY HISTORY: No family history on file.  SOCIAL HISTORY:   Social History     Socioeconomic History    Marital status:    Tobacco Use    Smoking status: Never    Smokeless tobacco: Never   Substance and Sexual Activity    Alcohol use: No    Drug use: No    Sexual activity: Yes       MEDICATIONS:   Current Outpatient Medications:     antiox #8/om3/dha/epa/lut/zeax (PRESERVISION AREDS 2, OMEGA-3, ORAL), Take by mouth., Disp: , Rfl:     ascorbic acid-bioflavonoids 200-300 mg Tab, TAKE  BY MOUTH, Disp: , Rfl:     aspirin (ECOTRIN) 81 MG EC tablet, Take 81 mg by mouth once daily.  , Disp: , Rfl:     beta-carotene,A,-vits C,E/mins (OCUVITE ORAL), Take 1 tablet by mouth 2 (two) times a day., Disp: , Rfl:     cyanocobalamin (VITAMIN B-12) 1000 MCG tablet, TAKE ONE TABLET BY MOUTH EVERY DAY FOR VITAMIN DEFICIENCIES, Disp: , Rfl:     docusate sodium (COLACE) 100 MG capsule, Take 1 capsule (100 mg total) by mouth 2 (two) times daily as needed for Constipation. (Patient not taking: Reported on 3/25/2024), Disp: 20 capsule, Rfl: 0    ezetimibe (ZETIA) 10 mg tablet, Take 10 mg by mouth once daily., Disp: , Rfl:     ezetimibe-simvastatin 10-10 mg (VYTORIN) 10-10 mg per tablet, Take 1 tablet by mouth every evening., Disp: , Rfl:     fluticasone (FLONASE) 50 mcg/actuation nasal spray, , Disp: , Rfl: 0    gabapentin (NEURONTIN) 300 MG capsule, Take 300 mg by mouth 3 (three) times daily., Disp: , Rfl:     hydroCHLOROthiazide (HYDRODIURIL) 25 MG tablet, Take 25 mg by mouth once daily., Disp: , Rfl:     insulin aspart U-100 (NOVOLOG) 100 unit/mL (3 mL) InPn pen, INJECT 5 UNITS  SUBCUTANEOUSLY WITH SUPPER FOR DIABETES  WITH FIRST BITE OF MAIN MEAL..   DONOTINJECT ASPART AT BEDTIME. DO NOT INJECT ASPART IF YOU ARE SKIPPING  THE ASSOCIATED MEAL. FOR DIABETES  WITH FIRST BITE OF MAIN MEAL..    DONOTINJECT ASPART AT BEDTIME. DO NOT INJECT ASPART IF YOU ARE SKIPPING   THE ASSOCIATED MEAL., Disp: , Rfl:     insulin glargine (LANTUS) 100 unit/mL injection, Inject 32 Units into the skin every evening.  , Disp: , Rfl:     lisinopril (PRINIVIL,ZESTRIL) 20 MG tablet, Take 20 mg by mouth once daily., Disp: , Rfl:     lisinopriL (PRINIVIL,ZESTRIL) 40 MG tablet, TAKE ONE-HALF TABLET BY MOUTH QD FOR HEART/BLOOD PRESSURE, Disp: , Rfl:     lisinopriL 10 MG tablet, Take 10 mg by mouth., Disp: , Rfl:     meclizine (ANTIVERT) 25 mg tablet, , Disp: , Rfl: 3    metFORMIN (GLUMETZA) 1000 MG (MOD) 24hr tablet, Take 1,000 mg by mouth daily with breakfast., Disp: , Rfl:     mupirocin (BACTROBAN) 2 % ointment, Apply topically 2 (two) times daily., Disp: 22 g, Rfl: 0    ondansetron (ZOFRAN) 4 MG tablet, Take 1 tablet (4 mg total) by mouth every 8 (eight) hours as needed for Nausea. (Patient not taking: Reported on 8/5/2024), Disp: 12 tablet, Rfl: 0    oxyCODONE-acetaminophen (PERCOCET)  mg per tablet, Take 1 tablet by mouth every 4-6 hours as needed for pain. Take stool softener with this medication. (Patient not taking: Reported on 8/5/2024), Disp: 21 tablet, Rfl: 0    pravastatin (PRAVACHOL) 80 MG tablet, Take 80 mg by mouth every evening., Disp: , Rfl:     promethazine (PHENERGAN) 25 MG tablet, Take 1 tablet (25 mg total) by mouth every 6 (six) hours as needed for Nausea. (Patient not taking: Reported on 8/5/2024), Disp: 8 tablet, Rfl: 0    sildenafiL (VIAGRA) 100 MG tablet, TAKE ONE-HALF TABLET BY MOUTH EVERY DAY 30 TO 60 MINUTES BEFORE INTERCOURSE FOR BEST RESULTS TAKE ON EMPTY STOMACH, Disp: , Rfl:     simvastatin (ZOCOR) 80 MG tablet, Take 80 mg by mouth every evening., Disp: , Rfl:     traMADoL (ULTRAM)  "50 mg tablet, Take 1-2 tablets ( mg total) by mouth every 6 (six) hours as needed for Pain. (Patient not taking: Reported on 6/21/2024), Disp: 21 tablet, Rfl: 0  ALLERGIES:   Review of patient's allergies indicates:   Allergen Reactions    Meloxicam Other (See Comments)     Drove blood pressure james high per pt       VITAL SIGNS: BP (!) 147/82 (BP Location: Right arm, Patient Position: Sitting)   Pulse 70   Ht 5' 10" (1.778 m)   Wt 73 kg (160 lb 13.2 oz)   BMI 23.08 kg/m²                                                                                     PHYSICAL EXAMINATION:     Incision sites healed well  No evidence of any erythema, infection or induration  elbow Range of motion full   No effusion  2+ Radial pulses  No swelling            ROM:      Forward Elevation: 150  ER: 40  IR: T12    Strength  Scaption at 0 and 30: 5/5  ER: 5/5                                                                         ASSESSMENT:                                                                                                                                               1. Status post above, doing well.                                                                                                                               PLAN:                                                                                                                                                     1. Continue PT; case discussed with therapist   2. Emphasized scapular function.  3. I have discussed return to activity in detail.  4. Patient will see us back in 6 months.                                      5. All questions were answered, surgical technique was reviewed and interpreted, and patient should contact us with any questions or concerns in the interim.                                                                                                   I made the decision to obtain old records of the patient including previous notes " and imaging. I independently reviewed and interpreted the radiographs and/or MRIs today as well as prior imaging.

## 2024-11-06 ENCOUNTER — CLINICAL SUPPORT (OUTPATIENT)
Dept: REHABILITATION | Facility: HOSPITAL | Age: 74
End: 2024-11-06
Payer: MEDICARE

## 2024-11-06 DIAGNOSIS — M25.512 ACUTE PAIN OF LEFT SHOULDER: Primary | ICD-10-CM

## 2024-11-06 PROCEDURE — 97530 THERAPEUTIC ACTIVITIES: CPT

## 2024-11-06 NOTE — PROGRESS NOTES
[facility]   Physical Therapy Treatment Note      Name: Marcos Barfield  Clinic Number: [id number]    Therapy Diagnosis:        Encounter Diagnosis   Name Primary?    Non-traumatic rotator cuff tear, left       Physician: Nhan Grullon III, *    Physician Orders: PT Eval and Treat  Medical Diagnosis from Referral:   M75.102 (ICD-10-CM) - Non-traumatic rotator cuff tear, left      Evaluation Date: 3/22/2024  Authorization Period Expiration: 12/31/2024  Plan of Care Expiration: 9/30/24  Progress Note Due: Performed 7/17/24  Date of Surgery: 3/12/24  Visit # / Visits authorized: 44/50  FOTO: 1/ 3     Precautions:   POSTOPERATIVE PLAN: We will follow the arthroscopic rotator cuff repair guidelines for a small size rotator cuff tear.  We discussed with the patient's family after surgery.  The patient will remain in a sling for 6 weeks.  PT to start at 1-2 weeks.     Cuff specific program:  Pendulum exercises and Codman's exercises in 5-7 days, protecting rotator cuff repair for 1 week by avoiding active motion program until 1 week.     PASSIVE ROM: ER side 30 degrees, Forward Flex 90 degrees, ABD - 60 degrees   Full AAROM/PROM starting at 5-7 days as tolerated       Time In: 10:00 am  Time Out: 10:53 am  Total Billable Time: 30 minutes one on one   Visit Date: 11/6/2024    Subjective     Patient reports: no issues since last seen him  He was compliant with home exercise program.  Response to previous treatment: Did well after evaluation  Functional change: ongoing    Pain: 2/10  Location: left shoulder      Objective    DOS: 3/12/24  POW:  7 months      Passive Range of Motion:   Shoulder Right Left   Flexion 165 160   ER at 90 90 85   IR 70 60      Active Range of Motion:   Shoulder Right Left   Flexion 150 150     Strength:  Shoulder Right Left   Flexion 4/5 4+/5   Abduction 4/5 4+/5   ER 4+/5 4+/5   IR 5/5 5/5         Treatment     Marcos received the treatments listed below:       therapeutic exercises to develop strength, endurance, and ROM for 15 minutes including:  Lat stretch with dowel - 20x5s   Behind back stretch with orange strap 20x10s     manual therapy techniques: Joint mobilizations and Soft tissue Mobilization were applied to the: L shoulder for 00 minutes, including:  Anterior/inf mobs - Grade 4   Inf mobs in full elevation Grade 5     neuromuscular re-education activities to improve: Balance, Coordination, and Proprioception for 15 minutes. The following activities were included:  Standing ER OTB at 90 degrees 3x12   ABCs on wall x2   Y's OTB 3x10       therapeutic activities to improve functional performance for 30 minutes, including:  UBE 4/4  Med ball press overhead 3x10   Bent over rows 8# 3x10   D1/D2 extension 3x10 10#   OH carries 4# 4 laps       Patient Education and Home Exercises       Education provided:   - POC  - HEP modifications    Written Home Exercises Provided: Patient instructed to cont prior HEP. Exercises were reviewed and Marcos was able to demonstrate them prior to the end of the session.  Marcos demonstrated good  understanding of the education provided. See Electronic Medical Record under Patient Instructions for exercises provided during therapy sessions    Assessment     Patient completed session as noted above. OH strength is improving but still needs strengthening. Will strength test next visit and determine remainder of POC        Marcos Is progressing well towards his goals.   Patient prognosis is Good.     Patient will continue to benefit from skilled outpatient physical therapy to address the deficits listed in the problem list box on initial evaluation, provide pt/family education and to maximize pt's level of independence in the home and community environment.     Patient's spiritual, cultural and educational needs considered and pt agreeable to plan of care and goals.     Anticipated barriers to physical therapy: advanced age    Goals:    Post OP shoulder Goals  Short Term Goals (4 Weeks):   1. Pt will be independent with HEP to supplement PT in improving functional use of R UE. MET  2. Pt will increase pain free left shoulder elevation PROM to >/= 160 deg to improve functional mobility of UE MET  3. Pt will increase L shoulder ER PROM in 90 deg abduction to >/=20 deg to improve functional mobility of UE MET  4. Pt will increase L elbow AAROM to WNL deg in 4 weeks to improve functional mobility of UE. MET     Long Term Goals (24 Weeks):  progressing all   1. Pt will improve FOTO score to </=30% disability limited to decrease perceived limitation with L shoulder carrying, moving, and handling objects. MET  2. Pt will increase L shoulder PROM to WNL in all planes to improve functional use of R RUE. MET  3. Pt will increase L shoulder AROM to WFL in all planes to improve functional use of R RUE. MET  4. Pt will improve L shoulder MMTs in areas of limitation to promote equal use of B UEs in performing functional tasks. Progressing   5. Pt will lift 10 lb objects without pain to promote functional QOL. Progressing     Plan     Plan of care Certification: 3/22/2024 to 9/30/24.     Outpatient Physical Therapy 1-2 times weekly for 24 weeks to include the following interventions: Gait Training, Manual Therapy, Neuromuscular Re-ed, Therapeutic Activities, and Therapeutic Exercise.     Wayne Sandoval, PT

## 2024-11-13 ENCOUNTER — CLINICAL SUPPORT (OUTPATIENT)
Dept: REHABILITATION | Facility: HOSPITAL | Age: 74
End: 2024-11-13
Payer: MEDICARE

## 2024-11-13 DIAGNOSIS — M25.512 ACUTE PAIN OF LEFT SHOULDER: Primary | ICD-10-CM

## 2024-11-13 PROCEDURE — 97530 THERAPEUTIC ACTIVITIES: CPT

## 2024-11-13 NOTE — PROGRESS NOTES
[facility]   Physical Therapy Treatment Note      Name: Marcos Barfield  Clinic Number: [id number]    Therapy Diagnosis:        Encounter Diagnosis   Name Primary?    Non-traumatic rotator cuff tear, left       Physician: Nhan Grullon III, *    Physician Orders: PT Eval and Treat  Medical Diagnosis from Referral:   M75.102 (ICD-10-CM) - Non-traumatic rotator cuff tear, left      Evaluation Date: 3/22/2024  Authorization Period Expiration: 12/31/2024  Plan of Care Expiration: 9/30/24  Progress Note Due: Performed 7/17/24  Date of Surgery: 3/12/24  Visit # / Visits authorized: 45/50  FOTO: 1/ 3     Precautions:   POSTOPERATIVE PLAN: We will follow the arthroscopic rotator cuff repair guidelines for a small size rotator cuff tear.  We discussed with the patient's family after surgery.  The patient will remain in a sling for 6 weeks.  PT to start at 1-2 weeks.     Cuff specific program:  Pendulum exercises and Codman's exercises in 5-7 days, protecting rotator cuff repair for 1 week by avoiding active motion program until 1 week.     PASSIVE ROM: ER side 30 degrees, Forward Flex 90 degrees, ABD - 60 degrees   Full AAROM/PROM starting at 5-7 days as tolerated       Time In: 10:00 am  Time Out: 10:53 am  Total Billable Time: 30 minutes one on one   Visit Date: 11/13/2024    Subjective     Patient reports: no issues since last visit  He was compliant with home exercise program.  Response to previous treatment: Did well after evaluation  Functional change: ongoing    Pain: 2/10  Location: left shoulder      Objective    DOS: 3/12/24  POW:  7 months      Passive Range of Motion:   Shoulder Right Left   Flexion 165 160   ER at 90 90 85   IR 70 60      Active Range of Motion:   Shoulder Right Left   Flexion 150 150     Strength:  Shoulder Right Left   Flexion 4/5 4+/5   Abduction 4/5 4+/5   ER 4+/5 4+/5   IR 5/5 5/5         Treatment     Marcos received the treatments listed below:      therapeutic  exercises to develop strength, endurance, and ROM for 15 minutes including:  Lat stretch with dowel - 20x5s   Behind back stretch with orange strap 20x10s     manual therapy techniques: Joint mobilizations and Soft tissue Mobilization were applied to the: L shoulder for 00 minutes, including:  Anterior/inf mobs - Grade 4   Inf mobs in full elevation Grade 5     neuromuscular re-education activities to improve: Balance, Coordination, and Proprioception for 15 minutes. The following activities were included:  Standing ER OTB at 90 degrees 3x12   ABCs on wall x2   Y's OTB 3x10       therapeutic activities to improve functional performance for 30 minutes, including:  UBE 4/4  Med ball press overhead 3x10   Bent over rows 8# 3x10   D1/D2 extension 3x10 10#   OH carries 4# 4 laps       Patient Education and Home Exercises       Education provided:   - POC  - HEP modifications    Written Home Exercises Provided: Patient instructed to cont prior HEP. Exercises were reviewed and Marcos was able to demonstrate them prior to the end of the session.  Marcos demonstrated good  understanding of the education provided. See Electronic Medical Record under Patient Instructions for exercises provided during therapy sessions    Assessment     Patient completed session as noted above. OH strength is improving but still needs strengthening. Will strength test next visit and determine remainder of POC        Marcos Is progressing well towards his goals.   Patient prognosis is Good.     Patient will continue to benefit from skilled outpatient physical therapy to address the deficits listed in the problem list box on initial evaluation, provide pt/family education and to maximize pt's level of independence in the home and community environment.     Patient's spiritual, cultural and educational needs considered and pt agreeable to plan of care and goals.     Anticipated barriers to physical therapy: advanced age    Goals:   Post OP shoulder  Goals  Short Term Goals (4 Weeks):   1. Pt will be independent with HEP to supplement PT in improving functional use of R UE. MET  2. Pt will increase pain free left shoulder elevation PROM to >/= 160 deg to improve functional mobility of UE MET  3. Pt will increase L shoulder ER PROM in 90 deg abduction to >/=20 deg to improve functional mobility of UE MET  4. Pt will increase L elbow AAROM to WNL deg in 4 weeks to improve functional mobility of UE. MET     Long Term Goals (24 Weeks):  progressing all   1. Pt will improve FOTO score to </=30% disability limited to decrease perceived limitation with L shoulder carrying, moving, and handling objects. MET  2. Pt will increase L shoulder PROM to WNL in all planes to improve functional use of R RUE. MET  3. Pt will increase L shoulder AROM to WFL in all planes to improve functional use of R RUE. MET  4. Pt will improve L shoulder MMTs in areas of limitation to promote equal use of B UEs in performing functional tasks. Progressing   5. Pt will lift 10 lb objects without pain to promote functional QOL. Progressing     Plan     Plan of care Certification: 3/22/2024 to 9/30/24.     Outpatient Physical Therapy 1-2 times weekly for 24 weeks to include the following interventions: Gait Training, Manual Therapy, Neuromuscular Re-ed, Therapeutic Activities, and Therapeutic Exercise.     Wayne Sadnoval, PT

## 2024-11-27 ENCOUNTER — CLINICAL SUPPORT (OUTPATIENT)
Dept: REHABILITATION | Facility: HOSPITAL | Age: 74
End: 2024-11-27
Payer: MEDICARE

## 2024-11-27 DIAGNOSIS — M25.512 ACUTE PAIN OF LEFT SHOULDER: Primary | ICD-10-CM

## 2024-11-27 PROCEDURE — 97530 THERAPEUTIC ACTIVITIES: CPT

## 2024-11-27 NOTE — PROGRESS NOTES
[facility]   Physical Therapy Treatment Note      Name: Marcos Barfield  Clinic Number: [id number]    Therapy Diagnosis:        Encounter Diagnosis   Name Primary?    Non-traumatic rotator cuff tear, left       Physician: Nhan Grullon III, *    Physician Orders: PT Eval and Treat  Medical Diagnosis from Referral:   M75.102 (ICD-10-CM) - Non-traumatic rotator cuff tear, left      Evaluation Date: 3/22/2024  Authorization Period Expiration: 12/31/2024  Plan of Care Expiration: 9/30/24  Progress Note Due: Performed 7/17/24  Date of Surgery: 3/12/24  Visit # / Visits authorized: 45/50  FOTO: 1/ 3     Precautions:   POSTOPERATIVE PLAN: We will follow the arthroscopic rotator cuff repair guidelines for a small size rotator cuff tear.  We discussed with the patient's family after surgery.  The patient will remain in a sling for 6 weeks.  PT to start at 1-2 weeks.     Cuff specific program:  Pendulum exercises and Codman's exercises in 5-7 days, protecting rotator cuff repair for 1 week by avoiding active motion program until 1 week.     PASSIVE ROM: ER side 30 degrees, Forward Flex 90 degrees, ABD - 60 degrees   Full AAROM/PROM starting at 5-7 days as tolerated       Time In: 10:00 am  Time Out: 10:53 am  Total Billable Time: 30 minutes one on one   Visit Date: 11/27/2024    Subjective     Patient reports: no issues since last visit  He was compliant with home exercise program.  Response to previous treatment: Did well after evaluation  Functional change: ongoing    Pain: 2/10  Location: left shoulder      Objective    DOS: 3/12/24  POW:  8 months      Passive Range of Motion:   Shoulder Right Left   Flexion 165 160   ER at 90 90 85   IR 70 60      Active Range of Motion:   Shoulder Right Left   Flexion 150 150     Strength:  Shoulder Right Left   Flexion 4/5 4+/5   Abduction 4/5 4+/5   ER 4+/5 4+/5   IR 5/5 5/5         Treatment     Marcos received the treatments listed below:      therapeutic  exercises to develop strength, endurance, and ROM for 15 minutes including:  Lat stretch with dowel - 20x5s   Behind back stretch with orange strap 20x10s     manual therapy techniques: Joint mobilizations and Soft tissue Mobilization were applied to the: L shoulder for 00 minutes, including:  Anterior/inf mobs - Grade 4   Inf mobs in full elevation Grade 5     neuromuscular re-education activities to improve: Balance, Coordination, and Proprioception for 15 minutes. The following activities were included:  Standing ER OTB at 90 degrees 3x12   ABCs on wall x2   Y's OTB 3x10       therapeutic activities to improve functional performance for 30 minutes, including:  UBE 4/4  Med ball press overhead 3x10   Bent over rows 8# 3x10   D1/D2 extension 3x10 10#   OH carries 4# 4 laps       Patient Education and Home Exercises       Education provided:   - POC  - HEP modifications    Written Home Exercises Provided: Patient instructed to cont prior HEP. Exercises were reviewed and Marcos was able to demonstrate them prior to the end of the session.  Marcos demonstrated good  understanding of the education provided. See Electronic Medical Record under Patient Instructions for exercises provided during therapy sessions    Assessment     Patient completed session as noted above. OH strength is improving but still needs strengthening. 1 month f/u to check in.         Marcos Is progressing well towards his goals.   Patient prognosis is Good.     Patient will continue to benefit from skilled outpatient physical therapy to address the deficits listed in the problem list box on initial evaluation, provide pt/family education and to maximize pt's level of independence in the home and community environment.     Patient's spiritual, cultural and educational needs considered and pt agreeable to plan of care and goals.     Anticipated barriers to physical therapy: advanced age    Goals:   Post OP shoulder Goals  Short Term Goals (4 Weeks):    1. Pt will be independent with HEP to supplement PT in improving functional use of R UE. MET  2. Pt will increase pain free left shoulder elevation PROM to >/= 160 deg to improve functional mobility of UE MET  3. Pt will increase L shoulder ER PROM in 90 deg abduction to >/=20 deg to improve functional mobility of UE MET  4. Pt will increase L elbow AAROM to WNL deg in 4 weeks to improve functional mobility of UE. MET     Long Term Goals (24 Weeks):  progressing all   1. Pt will improve FOTO score to </=30% disability limited to decrease perceived limitation with L shoulder carrying, moving, and handling objects. MET  2. Pt will increase L shoulder PROM to WNL in all planes to improve functional use of R RUE. MET  3. Pt will increase L shoulder AROM to WFL in all planes to improve functional use of R RUE. MET  4. Pt will improve L shoulder MMTs in areas of limitation to promote equal use of B UEs in performing functional tasks. Progressing   5. Pt will lift 10 lb objects without pain to promote functional QOL. Progressing     Plan     Plan of care Certification: 3/22/2024 to 9/30/24.     Outpatient Physical Therapy 1-2 times weekly for 24 weeks to include the following interventions: Gait Training, Manual Therapy, Neuromuscular Re-ed, Therapeutic Activities, and Therapeutic Exercise.     Wayne Sandoval, PT
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
## 2025-04-30 PROBLEM — R10.12 LEFT UPPER QUADRANT ABDOMINAL PAIN: Status: ACTIVE | Noted: 2025-04-30

## 2025-05-05 ENCOUNTER — OFFICE VISIT (OUTPATIENT)
Dept: SPORTS MEDICINE | Facility: CLINIC | Age: 75
End: 2025-05-05
Payer: MEDICARE

## 2025-05-05 VITALS
BODY MASS INDEX: 22.9 KG/M2 | SYSTOLIC BLOOD PRESSURE: 130 MMHG | HEART RATE: 65 BPM | HEIGHT: 70 IN | DIASTOLIC BLOOD PRESSURE: 69 MMHG | WEIGHT: 160 LBS

## 2025-05-05 DIAGNOSIS — Z98.890 S/P ROTATOR CUFF SURGERY: ICD-10-CM

## 2025-05-05 DIAGNOSIS — M25.512 LEFT SHOULDER PAIN, UNSPECIFIED CHRONICITY: Primary | ICD-10-CM

## 2025-05-05 PROCEDURE — 3288F FALL RISK ASSESSMENT DOCD: CPT | Mod: CPTII,S$GLB,, | Performed by: ORTHOPAEDIC SURGERY

## 2025-05-05 PROCEDURE — 99214 OFFICE O/P EST MOD 30 MIN: CPT | Mod: S$GLB,,, | Performed by: ORTHOPAEDIC SURGERY

## 2025-05-05 PROCEDURE — 3078F DIAST BP <80 MM HG: CPT | Mod: CPTII,S$GLB,, | Performed by: ORTHOPAEDIC SURGERY

## 2025-05-05 PROCEDURE — 1126F AMNT PAIN NOTED NONE PRSNT: CPT | Mod: CPTII,S$GLB,, | Performed by: ORTHOPAEDIC SURGERY

## 2025-05-05 PROCEDURE — 1101F PT FALLS ASSESS-DOCD LE1/YR: CPT | Mod: CPTII,S$GLB,, | Performed by: ORTHOPAEDIC SURGERY

## 2025-05-05 PROCEDURE — 99999 PR PBB SHADOW E&M-EST. PATIENT-LVL IV: CPT | Mod: PBBFAC,,, | Performed by: ORTHOPAEDIC SURGERY

## 2025-05-05 PROCEDURE — 1159F MED LIST DOCD IN RCRD: CPT | Mod: CPTII,S$GLB,, | Performed by: ORTHOPAEDIC SURGERY

## 2025-05-05 PROCEDURE — 3075F SYST BP GE 130 - 139MM HG: CPT | Mod: CPTII,S$GLB,, | Performed by: ORTHOPAEDIC SURGERY
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
## 2025-06-09 NOTE — PLAN OF CARE
Pre op complete. Patient resting comfortably. Call light in reach. Wife to bedside shortly, will take belongings. Waiting on anesthesia consent, site frankie, and H&P update. Patient blood sugar of 111.   yellow

## (undated) DEVICE — SEE MEDLINE ITEM 153151

## (undated) DEVICE — GAUZE SPONGE 4X4 12PLY

## (undated) DEVICE — APPLICATOR CHLORAPREP ORN 26ML

## (undated) DEVICE — PAD ELECTRODE STER 1.5X3

## (undated) DEVICE — NANO NEEDLE SCOPE 125MM

## (undated) DEVICE — Device

## (undated) DEVICE — UNDERGLOVES BIOGEL PI SIZE 7.5

## (undated) DEVICE — KIT SHOULDER POSITIONER SPIDER

## (undated) DEVICE — CANNULA DRY DOC 7 X 85

## (undated) DEVICE — PAD ABDOMINAL STERILE 8X10IN

## (undated) DEVICE — SEE MEDLINE ITEM 152529

## (undated) DEVICE — SHAVER ULTRAFFR 4.2MM

## (undated) DEVICE — DRESSING XEROFORM NONADH 1X8IN

## (undated) DEVICE — SUT MCRYL PLUS 4-0 PS2 27IN

## (undated) DEVICE — SYR LUER LOCK 1CC

## (undated) DEVICE — NDL 18GA X1 1/2 REG BEVEL

## (undated) DEVICE — SUPPORT SLING SHOT II MEDIUM

## (undated) DEVICE — STRIP MEDI WND CLSR 1/2X4IN

## (undated) DEVICE — GLOVE BIOGEL SKINSENSE PI 7.0

## (undated) DEVICE — GLOVE SURGEON SYN PF SZ 9

## (undated) DEVICE — SUT VICRYL PLUS 3-0 SH 18IN

## (undated) DEVICE — DRAPE STERI U-SHAPED 47X51IN

## (undated) DEVICE — SPONGE LAP 18X18 PREWASHED

## (undated) DEVICE — DRESSING XEROFORM FOIL PK 1X8

## (undated) DEVICE — SYR B-D DISP CONTROL 10CC100/C

## (undated) DEVICE — NDL SCORPIAN SUTURE PASSER

## (undated) DEVICE — CLOSURE SKIN STERI STRIP 1/2X4

## (undated) DEVICE — NDL SPINAL 18GX3.5 SPINOCAN

## (undated) DEVICE — DRAPE INCISE IOBAN 2 23X17IN

## (undated) DEVICE — ADHESIVE MASTISOL VIAL 48/BX

## (undated) DEVICE — GOWN ECLIPSE REINF LVL4 TWL XL

## (undated) DEVICE — SOL NACL IRR 1000ML BTL

## (undated) DEVICE — DRAPE STERI INSTRUMENT 1018

## (undated) DEVICE — ELECTRODE REM PLYHSV RETURN 9

## (undated) DEVICE — SEE MEDLINE ITEM 146313

## (undated) DEVICE — TAPE SURG MEDIPORE 6X72IN

## (undated) DEVICE — POSITIONER IV ARMBOARD FOAM

## (undated) DEVICE — DRG DOC 8.0 X 85MM

## (undated) DEVICE — SYR 10CC LUER LOCK

## (undated) DEVICE — SEE MEDLINE ITEM 146420

## (undated) DEVICE — SYR DISP LL 5CC

## (undated) DEVICE — BLADE SHAVER 4.5 6/BX

## (undated) DEVICE — SEE MEDLINE ITEM 152622

## (undated) DEVICE — GLOVE ORTHO PF SZ 8.5

## (undated) DEVICE — PROBE ARTHO ENERGY 90 DEG

## (undated) DEVICE — GOWN SMART IMP BREATHABLE XXLG

## (undated) DEVICE — PAD DERMAPROX THCK 11X15X1IN

## (undated) DEVICE — CONNECTOR TUBING STR 5 IN 1

## (undated) DEVICE — NDL HYPO REG 25G X 1 1/2

## (undated) DEVICE — GOWN ECLIPSE REINF LV4 TWL 2XL

## (undated) DEVICE — CANNULA ARTHRO NON THREAD

## (undated) DEVICE — SUT VICRYL PLUS 0 CT1 18IN

## (undated) DEVICE — KIT FIBERTAK STRAIGHT SPEAR

## (undated) DEVICE — YANKAUER OPEN TIP W/O VENT

## (undated) DEVICE — SOL IRR NACL .9% 3000ML

## (undated) DEVICE — COVER LIGHT HANDLE 80/CA

## (undated) DEVICE — GRAFT SPREADER

## (undated) DEVICE — WRAP SHLDR HIP ACCU THRM PACK

## (undated) DEVICE — TUBING SUC UNIV W/CONN 12FT

## (undated) DEVICE — PAD SHOULDER CARE POLAR

## (undated) DEVICE — CUTTER SUT KNOT PUSH PRTL SKID

## (undated) DEVICE — SEE MEDLINE ITEM 146347

## (undated) DEVICE — SOL NACL IRR 3000ML

## (undated) DEVICE — DECANTER 6 VIAL

## (undated) DEVICE — PAD ABD 8X10 STERILE

## (undated) DEVICE — SOL 9P NACL IRR PIC IL

## (undated) DEVICE — SEE MEDLINE ITEM 152530

## (undated) DEVICE — BUTTON PASSPORT CANN 2X4CM

## (undated) DEVICE — COVER CAMERA OPERATING ROOM

## (undated) DEVICE — TOWEL OR DISP STRL BLUE 4/PK

## (undated) DEVICE — PROBE ARTHSCP EDGE  90 SUCTION

## (undated) DEVICE — GOWN ECLIPSE REINF LV4 XLNG XL

## (undated) DEVICE — TAPE MEDIPORE 4IN X 2YDS

## (undated) DEVICE — BNDG COFLEX FOAM LF2 ST 6X5YD

## (undated) DEVICE — SEE MEDLINE ITEM 157117

## (undated) DEVICE — BNDG COFLEX FOAM LF2 ST 4X5YD

## (undated) DEVICE — SYR IRRIGATION BULB STER 60ML

## (undated) DEVICE — GLOVE SENSICARE PI GRN 7.5

## (undated) DEVICE — SUT 1 36IN PDS II VIO MONO

## (undated) DEVICE — DRAPE STERI-DRAPE 1000 17X11IN

## (undated) DEVICE — LOOP VESSEL BLUE MINI 2/CARD

## (undated) DEVICE — TUBE SET INFLOW/OUTFLOW